# Patient Record
Sex: FEMALE | NOT HISPANIC OR LATINO | Employment: FULL TIME | URBAN - METROPOLITAN AREA
[De-identification: names, ages, dates, MRNs, and addresses within clinical notes are randomized per-mention and may not be internally consistent; named-entity substitution may affect disease eponyms.]

---

## 2017-01-13 RX ORDER — FLUTICASONE PROPIONATE 50 MCG
SPRAY, SUSPENSION (ML) NASAL
Qty: 16 G | Refills: 3 | Status: SHIPPED | OUTPATIENT
Start: 2017-01-13

## 2017-05-12 ENCOUNTER — OFFICE VISIT (OUTPATIENT)
Dept: INTERNAL MEDICINE | Facility: MEDICAL CENTER | Age: 62
End: 2017-05-12
Payer: COMMERCIAL

## 2017-05-12 VITALS
HEIGHT: 62 IN | DIASTOLIC BLOOD PRESSURE: 69 MMHG | OXYGEN SATURATION: 95 % | RESPIRATION RATE: 18 BRPM | TEMPERATURE: 97.9 F | SYSTOLIC BLOOD PRESSURE: 100 MMHG | HEART RATE: 74 BPM

## 2017-05-12 DIAGNOSIS — F43.9 STRESS: ICD-10-CM

## 2017-05-12 DIAGNOSIS — K12.0 APHTHOUS ULCER OF MOUTH: ICD-10-CM

## 2017-05-12 PROCEDURE — 99213 OFFICE O/P EST LOW 20 MIN: CPT | Mod: GE | Performed by: INTERNAL MEDICINE

## 2017-05-12 ASSESSMENT — ENCOUNTER SYMPTOMS
WEIGHT LOSS: 0
NAUSEA: 0
BLOOD IN STOOL: 0
HEARTBURN: 0
CHILLS: 0
SORE THROAT: 1
FLANK PAIN: 0
CONSTIPATION: 0
NERVOUS/ANXIOUS: 1
DIARRHEA: 0
FOCAL WEAKNESS: 0
COUGH: 0
POLYDIPSIA: 0
HEADACHES: 0
DIZZINESS: 0
FEVER: 0
DEPRESSION: 0
DOUBLE VISION: 0
NECK PAIN: 0
BLURRED VISION: 0
VOMITING: 0
SHORTNESS OF BREATH: 0
MYALGIAS: 0

## 2017-05-12 ASSESSMENT — PATIENT HEALTH QUESTIONNAIRE - PHQ9
CLINICAL INTERPRETATION OF PHQ2 SCORE: 4
5. POOR APPETITE OR OVEREATING: 0 - NOT AT ALL
SUM OF ALL RESPONSES TO PHQ QUESTIONS 1-9: 6

## 2017-05-12 NOTE — MR AVS SNAPSHOT
"        Shaunna Vieira   2017 8:30 AM   Office Visit   MRN: [id number]    Department:  [facility]   Dept Phone:  [phone]    Description:  Female : 1955   Provider:  Holden Jauregui D.O.           Reason for Visit     Pharyngitis sore throat, x 1 week       Allergies as of 2017     Allergen Noted Reactions    Morphine 2015   Shortness of Breath    Also get hallucinations    Prilosec [Omeprazole] 08/15/2016       Nystatin 2015   Rash    .    Pravastatin 2015       Sulfa Drugs 2014       Pt doesn't know what happens.      You were diagnosed with     Aphthous ulcer of mouth   [682098]       Stress   [989789]         Vital Signs     Blood Pressure Pulse Temperature Respirations Height       100/69 mmHg 74 36.6 °C (97.9 °F) 18 1.575 m (5' 2.01\")     Oxygen Saturation Smoking Status                95% Never Smoker           Basic Information     Date Of Birth Sex Race Ethnicity Preferred Language    1955 Female White Non- English      Problem List              ICD-10-CM Priority Class Noted - Resolved    Colon polyps K63.5   2016 - Present    Impaired fasting glucose R73.01   2016 - Present    Vitamin D deficiency E55.9   2016 - Present    Mixed hyperlipidemia E78.2   2016 - Present    Gastroesophageal reflux disease without esophagitis K21.9   2016 - Present    Tinnitus of both ears H93.13   2016 - Present    Abnormal liver function tests R79.89   2016 - Present    Obesity (BMI 35.0-39.9 without comorbidity) (HCC) E66.9   2016 - Present    Environmental allergies Z91.09   2016 - Present    Healthcare maintenance Z00.00   2016 - Present    Stress F43.9   2016 - Present    Dermatitis L30.9   2016 - Present    Knee pain, left M25.562   2016 - Present      Health Maintenance        Date Due Completion Dates    IMM DTaP/Tdap/Td Vaccine (1 - Tdap) 1974 ---    PAP SMEAR 1976 " ---    IMM ZOSTER VACCINE 11/2/2015 ---    MAMMOGRAM 7/31/2016 7/31/2015, 8/2/2013, 7/26/2013, 7/9/2010, 7/9/2010, 1/9/2009    COLONOSCOPY 1/11/2025 1/11/2015 (Done)    Override on 1/11/2015: Done            Current Immunizations     No immunizations on file.      Below and/or attached are the medications your provider expects you to take. Review all of your home medications and newly ordered medications with your provider and/or pharmacist. Follow medication instructions as directed by your provider and/or pharmacist. Please keep your medication list with you and share with your provider. Update the information when medications are discontinued, doses are changed, or new medications (including over-the-counter products) are added; and carry medication information at all times in the event of emergency situations     Allergies:  MORPHINE - Shortness of Breath     PRILOSEC - (reactions not documented)     NYSTATIN - Rash     PRAVASTATIN - (reactions not documented)     SULFA DRUGS - (reactions not documented)               Medications  Valid as of: May 12, 2017 -  9:25 AM    Generic Name Brand Name Tablet Size Instructions for use    Ascorbic Acid   Take  by mouth every day.        Benzocaine (Lozenge) Benzocaine 10 MG Spray 1 Lozenge in mouth/throat 2 times a day as needed.        Benzonatate (Cap) TESSALON 100 MG Take 1 Cap by mouth 3 times a day as needed for Cough.        Chlorhexidine Gluconate (Liquid) HIBICLENS 4 % Apply  to affected area(s) 1 time daily as needed.        Cholecalciferol (Cap) Vitamin D 2000 UNITS Take  by mouth 2 times a day.        Clindamycin Phos-Benzoyl Perox (Gel) BENZACLIN 1-5 % Apply  to affected area(s) 2 times a day.        Fluticasone Furoate   Spray  in nose every day.        Fluticasone Propionate (Suspension) FLONASE 50 MCG/ACT USE 2 SPRAYS IN EACH NOSTRIL DAILY AS NEEDED        Glucosamine-Chondroit-Vit C-Mn   Take  by mouth every day.        Hydrocortisone (Cream)  hydrocortisone 2.5 % Apply  to affected area(s) as needed.        Hydrocortisone (Ointment) hydrocortisone 1 % Apply  to affected area(s) 2 times a day.        Multiple Vitamin   Take  by mouth.        Mupirocin (Ointment) BACTROBAN 2 % Apply  to affected area(s) as needed.        .                 Medicines prescribed today were sent to:     [id number] - [place], [place] - [address]    [address] [place] [address]    Phone: [phone] Fax: [phone]    Open 24 Hours?: No    [facility] - [place], [place] - [address]    [address] [address]    Phone: [phone] Fax: [phone]    Open 24 Hours?: No      Medication refill instructions:       If your prescription bottle indicates you have medication refills left, it is not necessary to call your provider’s office. Please contact your pharmacy and they will refill your medication.    If your prescription bottle indicates you do not have any refills left, you may request refills at any time through one of the following ways: The online [email or web address] system (except Urgent Care), by calling your provider’s office, or by asking your pharmacy to contact your provider’s office with a refill request. Medication refills are processed only during regular business hours and may not be available until the next business day. Your provider may request additional information or to have a follow-up visit with you prior to refilling your medication.   *Please Note: Medication refills are assigned a new Rx number when refilled electronically. Your pharmacy may indicate that no refills were authorized even though a new prescription for the same medication is available at the pharmacy. Please request the medicine by name with the pharmacy before contacting your provider for a refill.        Your To Do List     Future Labs/Procedures Complete By Expires    HEPATITIS PANEL ACUTE(4 COMPONENTS)  As directed 5/12/2018    HIV  ANTIBODIES  As directed 5/12/2018      Referral     A referral request has been sent to our patient care coordination department. Please allow 3-5 business days for us to process this request and contact you either by phone or mail. If you do not hear from us by the 5th business day, please call us at [phone].        Instructions    Canker Sores  Canker sores are small, painful sores that develop inside your mouth. They may also be called aphthous ulcers. You can get canker sores on the inside of your lips or cheeks, on your tongue, or anywhere inside your mouth. You can have just one canker sore or several of them. Canker sores cannot be passed from one person to another (noncontagious). These sores are different than the sores that you may get on the outside of your lips (cold sores or fever blisters).  Canker sores usually start as painful red bumps. Then they turn into small white, yellow, or gray ulcers that have red borders. The ulcers may be quite painful. The pain may be worse when you eat or drink.  CAUSES  The cause of this condition is not known.  RISK FACTORS  This condition is more likely to develop in:  · Women.  · People in their teens or 20s.  · Women who are having their menstrual period.  · People who are under a lot of emotional stress.  · People who do not get enough iron or B vitamins.  · People who have poor oral hygiene.  · People who have an injury inside the mouth. This can happen after having dental work or from chewing something hard.  SYMPTOMS  Along with the canker sore, symptoms may also include:  · Fever.  · Fatigue.  · Swollen lymph nodes in your neck.  DIAGNOSIS  This condition can be diagnosed based on your symptoms. Your health care provider will also examine your mouth. Your health care provider may also do tests if you get canker sores often or if they are very bad. Tests may include:  · Blood tests to rule out other causes of canker sores.  · Taking swabs from the sore to  check for infection.  · Taking a small piece of skin from the sore (biopsy) to test it for cancer.  TREATMENT  Most canker sores clear up without treatment in about 10 days. Home care is usually the only treatment that you will need. Over-the-counter medicines can relieve discomfort. If you have severe canker sores, your health care provider may prescribe:  · Numbing ointment to relieve pain.  · Vitamins.  · Steroid medicines. These may be given as:  ¨ Oral pills.  ¨ Mouth rinses.  ¨ Gels.  · Antibiotic mouth rinse.  HOME CARE INSTRUCTIONS  · Apply, take, or use medicines only as directed by your health care provider. These include vitamins.  · If you were prescribed an antibiotic mouth rinse, finish all of it even if you start to feel better.  · Until the sores are healed:  ¨ Do not drink coffee or citrus juices.  ¨ Do not eat spicy or salty foods.  · Use a mild, over-the-counter mouth rinse as directed by your health care provider.  · Practice good oral hygiene.  ¨ Floss your teeth every day.  ¨ Brush your teeth with a soft brush twice each day.  SEEK MEDICAL CARE IF:  · Your symptoms do not get better after two weeks.  · You also have a fever or swollen glands.  · You get canker sores often.  · You have a canker sore that is getting larger.  · You cannot eat or drink due to your canker sores.     This information is not intended to replace advice given to you by your health care provider. Make sure you discuss any questions you have with your health care provider.     Document Released: 04/13/2012 Document Revised: 05/03/2016 Document Reviewed: 11/18/2015  [employer] Interactive Patient Education ©2016 [employer] Inc.            Miappi Access Code: [email or web address]  Expires: 6/11/2017  8:15 AM    Miappi  A secure, online tool to manage your health information     [employer] Miappi® is a secure, online tool that connects you to your personalized health information from the privacy of your home -- day or night  - making it very easy for you to manage your healthcare. Once the activation process is completed, you can even access your medical information using the [employer] emeterio, which is available for free in the Apple Emeterio store or Google Play store.     [employer] provides the following levels of access (as shown below):   My Chart Features   [facility] Doctor [facility]  Specialists [facility]  Urgent  Care [facility]  Primary Care  Doctor   Email your healthcare team securely and privately 24/7 X X X    Manage appointments: schedule your next appointment; view details of past/upcoming appointments X      Request prescription refills. X      View recent personal medical records, including lab and immunizations X X X X   View health record, including health history, allergies, medications X X X X   Read reports about your outpatient visits, procedures, consult and ER notes X X X X   See your discharge summary, which is a recap of your hospital and/or ER visit that includes your diagnosis, lab results, and care plan. X X       How to register for [employer]:  1. Go to  [email or web address].  2. Click on the Sign Up Now box, which takes you to the New Member Sign Up page. You will need to provide the following information:  a. Enter your [employer] Access Code exactly as it appears at the top of this page. (You will not need to use this code after you’ve completed the sign-up process. If you do not sign up before the expiration date, you must request a new code.)   b. Enter your date of birth.   c. Enter your home email address.   d. Click Submit, and follow the next screen’s instructions.  3. Create a [employer] ID. This will be your [employer] login ID and cannot be changed, so think of one that is secure and easy to remember.  4. Create a [employer] password. You can change your password at any time.  5. Enter your Password Reset Question and Answer. This can be used at a later time if you forget your password.   6. Enter your e-mail  address. This allows you to receive e-mail notifications when new information is available in H.BLOOM.  7. Click Sign Up. You can now view your health information.    For assistance activating your [employer].BL[employer]OM account, call [phone]

## 2017-05-12 NOTE — PATIENT INSTRUCTIONS
Canker Sores  Canker sores are small, painful sores that develop inside your mouth. They may also be called aphthous ulcers. You can get canker sores on the inside of your lips or cheeks, on your tongue, or anywhere inside your mouth. You can have just one canker sore or several of them. Canker sores cannot be passed from one person to another (noncontagious). These sores are different than the sores that you may get on the outside of your lips (cold sores or fever blisters).  Canker sores usually start as painful red bumps. Then they turn into small white, yellow, or gray ulcers that have red borders. The ulcers may be quite painful. The pain may be worse when you eat or drink.  CAUSES  The cause of this condition is not known.  RISK FACTORS  This condition is more likely to develop in:  · Women.  · People in their teens or 20s.  · Women who are having their menstrual period.  · People who are under a lot of emotional stress.  · People who do not get enough iron or B vitamins.  · People who have poor oral hygiene.  · People who have an injury inside the mouth. This can happen after having dental work or from chewing something hard.  SYMPTOMS  Along with the canker sore, symptoms may also include:  · Fever.  · Fatigue.  · Swollen lymph nodes in your neck.  DIAGNOSIS  This condition can be diagnosed based on your symptoms. Your health care provider will also examine your mouth. Your health care provider may also do tests if you get canker sores often or if they are very bad. Tests may include:  · Blood tests to rule out other causes of canker sores.  · Taking swabs from the sore to check for infection.  · Taking a small piece of skin from the sore (biopsy) to test it for cancer.  TREATMENT  Most canker sores clear up without treatment in about 10 days. Home care is usually the only treatment that you will need. Over-the-counter medicines can relieve discomfort. If you have severe canker sores, your health care  provider may prescribe:  · Numbing ointment to relieve pain.  · Vitamins.  · Steroid medicines. These may be given as:  ¨ Oral pills.  ¨ Mouth rinses.  ¨ Gels.  · Antibiotic mouth rinse.  HOME CARE INSTRUCTIONS  · Apply, take, or use medicines only as directed by your health care provider. These include vitamins.  · If you were prescribed an antibiotic mouth rinse, finish all of it even if you start to feel better.  · Until the sores are healed:  ¨ Do not drink coffee or citrus juices.  ¨ Do not eat spicy or salty foods.  · Use a mild, over-the-counter mouth rinse as directed by your health care provider.  · Practice good oral hygiene.  ¨ Floss your teeth every day.  ¨ Brush your teeth with a soft brush twice each day.  SEEK MEDICAL CARE IF:  · Your symptoms do not get better after two weeks.  · You also have a fever or swollen glands.  · You get canker sores often.  · You have a canker sore that is getting larger.  · You cannot eat or drink due to your canker sores.     This information is not intended to replace advice given to you by your health care provider. Make sure you discuss any questions you have with your health care provider.     Document Released: 04/13/2012 Document Revised: 05/03/2016 Document Reviewed: 11/18/2015  [employer] Interactive Patient Education ©2016 [employer]

## 2017-05-12 NOTE — PROGRESS NOTES
"      Established Patient    Shaunna presents today with the following:    CC: \"Tonsillitis\"    HPI:   This patient is a very pleasant 61 year old female reporting to clinic for acute complaint of painful right tonsil of one week's duration. She notes no cough, fever, chills, nausea, vomiting, or shortness of breath. She states the pain sometimes improves with mouthwash or benzocaine lozenges. She does endorse occasional oral sores which feel similarly but are not usually in this location.     Although she reports no high risk sexual behavior, she is of an age that indicates screening for HIV.     Patient Active Problem List    Diagnosis Date Noted   • Colon polyps 08/25/2016   • Impaired fasting glucose 08/25/2016   • Vitamin D deficiency 08/25/2016   • Mixed hyperlipidemia 08/25/2016   • Gastroesophageal reflux disease without esophagitis 08/25/2016   • Tinnitus of both ears 08/25/2016   • Abnormal liver function tests 08/25/2016   • Obesity (BMI 35.0-39.9 without comorbidity) ([facility]) 08/25/2016   • Environmental allergies 08/25/2016   • Healthcare maintenance 08/25/2016   • Stress 08/25/2016   • Dermatitis 08/25/2016   • Knee pain, left 08/25/2016       Current Outpatient Prescriptions   Medication Sig Dispense Refill   • fluticasone (FLONASE) 50 MCG/ACT nasal spray USE 2 SPRAYS IN EACH NOSTRIL DAILY AS NEEDED 16 g 3   • benzonatate (TESSALON) 100 MG Cap Take 1 Cap by mouth 3 times a day as needed for Cough. 30 Cap 0   • clindamycin-benzoyl peroxide (BENZACLIN) gel Apply  to affected area(s) 2 times a day.     • mupirocin (BACTROBAN) 2 % OINT Apply  to affected area(s) as needed.     • Cholecalciferol (VITAMIN D) 2000 UNITS CAPS Take  by mouth 2 times a day.     • Glucosamine-Chondroit-Vit C-Mn (GLUCOSAMINE CHONDR 1500 COMPLX PO) Take  by mouth every day.     • FLUTICASONE FUROATE NA [place]  in nose every day.     • Ascorbic Acid (VITAMIN C PO) Take  by mouth every day.     • chlorhexidine (HIBICLENS) 4 % liquid Apply " " to affected area(s) 1 time daily as needed.     • Multiple Vitamin (MULTI-VITAMIN DAILY PO) Take  by mouth.     • hydrocortisone 2.5 % CREA Apply  to affected area(s) as needed.     • hydrocortisone 1 % OINT Apply  to affected area(s) 2 times a day.       No current facility-administered medications for this visit.       ROS: As per HPI. Additional pertinent symptoms as noted below.  Review of Systems   Constitutional: Negative for fever, chills and weight loss.   HENT: Positive for sore throat.    Eyes: Negative for blurred vision and double vision.   Respiratory: Negative for cough and shortness of breath.    Cardiovascular: Negative for chest pain and leg swelling.   Gastrointestinal: Negative for heartburn, nausea, vomiting, diarrhea, constipation and blood in stool.   Genitourinary: Negative for dysuria and flank pain.   Musculoskeletal: Negative for myalgias and neck pain.   Neurological: Negative for dizziness, focal weakness and headaches.   Endo/Heme/Allergies: Negative for environmental allergies and polydipsia.   Psychiatric/Behavioral: Negative for depression. The patient is nervous/anxious.    All other systems reviewed and are negative.      /69 mmHg  Pulse 74  Temp(Src) 36.6 °C (97.9 °F)  Resp 18  Ht 1.575 m (5' 2.01\")  Wt   SpO2 95%    Physical Exam   Constitutional: She is oriented to person, place, and time and well-developed, well-nourished, and in no distress. No distress.   HENT:   Head: Normocephalic and atraumatic.   Right Ear: External ear normal.   Left Ear: External ear normal.   Mouth/Throat:       Eyes: Conjunctivae and EOM are normal. Pupils are equal, round, and reactive to light.   Neck: Normal range of motion. No thyromegaly present.   Cardiovascular: Normal rate, regular rhythm and normal heart sounds.    Pulmonary/Chest: Effort normal. No respiratory distress.   Abdominal: Soft. Bowel sounds are normal. She exhibits no distension.   Musculoskeletal: Normal range of " motion. She exhibits no edema.   Neurological: She is alert and oriented to person, place, and time. GCS score is 15.   Skin: Skin is warm and dry.   Psychiatric: Mood, memory, affect and judgment normal.   Vitals reviewed.        Assessment and Plan    1. Aphthous ulcer of mouth  Will screen for HIV. Recommend symptomatic care with oral rinse and benzocaine lozenges.   -rapid strep testing negative  -prescription for benzocaine lozenges    2. Stress  Patient reports she has discussed possible referral to psychology for her elevated anxiety and stress, but today she is definitively requesting referral.  -referral placed to psychology      Followup: Return if symptoms worsen or fail to improve.      Signed by: Holden Jauregui D.O.

## 2018-01-17 ENCOUNTER — HOSPITAL ENCOUNTER (OUTPATIENT)
Dept: RADIOLOGY | Facility: MEDICAL CENTER | Age: 63
End: 2018-01-17
Attending: INTERNAL MEDICINE
Payer: COMMERCIAL

## 2018-01-17 DIAGNOSIS — Z12.31 SCREENING MAMMOGRAM, ENCOUNTER FOR: ICD-10-CM

## 2018-01-17 PROCEDURE — 77067 SCR MAMMO BI INCL CAD: CPT

## 2018-03-07 ENCOUNTER — HOSPITAL ENCOUNTER (OUTPATIENT)
Facility: MEDICAL CENTER | Age: 63
End: 2018-03-07
Attending: NURSE PRACTITIONER
Payer: COMMERCIAL

## 2018-03-07 ENCOUNTER — OFFICE VISIT (OUTPATIENT)
Dept: MEDICAL GROUP | Facility: MEDICAL CENTER | Age: 63
End: 2018-03-07

## 2018-03-07 VITALS
HEIGHT: 63 IN | BODY MASS INDEX: 35.08 KG/M2 | WEIGHT: 198 LBS | SYSTOLIC BLOOD PRESSURE: 108 MMHG | RESPIRATION RATE: 16 BRPM | TEMPERATURE: 98.5 F | DIASTOLIC BLOOD PRESSURE: 68 MMHG | OXYGEN SATURATION: 95 % | HEART RATE: 91 BPM

## 2018-03-07 DIAGNOSIS — N93.9 ABNORMAL UTERINE BLEEDING: ICD-10-CM

## 2018-03-07 DIAGNOSIS — N88.9 CERVIX ABNORMALITY: ICD-10-CM

## 2018-03-07 DIAGNOSIS — Z12.4 PAP SMEAR FOR CERVICAL CANCER SCREENING: ICD-10-CM

## 2018-03-07 DIAGNOSIS — E66.9 OBESITY (BMI 30-39.9): ICD-10-CM

## 2018-03-07 PROBLEM — Z00.00 HEALTH CARE MAINTENANCE: Status: ACTIVE | Noted: 2018-03-07

## 2018-03-07 PROCEDURE — 87510 GARDNER VAG DNA DIR PROBE: CPT

## 2018-03-07 PROCEDURE — 88175 CYTOPATH C/V AUTO FLUID REDO: CPT

## 2018-03-07 PROCEDURE — 87624 HPV HI-RISK TYP POOLED RSLT: CPT

## 2018-03-07 PROCEDURE — 87480 CANDIDA DNA DIR PROBE: CPT

## 2018-03-07 PROCEDURE — 99204 OFFICE O/P NEW MOD 45 MIN: CPT | Performed by: NURSE PRACTITIONER

## 2018-03-07 PROCEDURE — 87660 TRICHOMONAS VAGIN DIR PROBE: CPT

## 2018-03-07 NOTE — ASSESSMENT & PLAN NOTE
Occurred around the age of 55 after losing 30 pounds. Patient has recently had some cyclic pink/brown tinged discharge, occasional red bleeding. Has had some mild associated cramping similar to menstrual cramps. Patient has a very significant family history of multiple different cancers, therefore she would like to have a pelvic and pap to make sure there is no signs of cancer. Denies abdominal pain, bloating, fevers. Has had one abnormal pap in the past, unsure of what the diagnosis was.

## 2018-03-07 NOTE — PROGRESS NOTES
Shaunna Vieira is a 62 y.o. female here to establish care and discuss vaginal bleeding:    HPI:    Abnormal uterine bleeding  Occurred around the age of 55 after losing 30 pounds. Patient has recently had some cyclic pink/brown tinged discharge, occasional red bleeding. Has had some mild associated cramping similar to menstrual cramps. Patient has a very significant family history of multiple different cancers, therefore she would like to have a pelvic and pap to make sure there is no signs of cancer. Denies abdominal pain, bloating, fevers. Has had one abnormal pap in the past, unsure of what the diagnosis was.     Current medicines (including changes today)  Current Outpatient Prescriptions   Medication Sig Dispense Refill   • fluticasone (FLONASE) 50 MCG/ACT nasal spray USE 2 SPRAYS IN EACH NOSTRIL DAILY AS NEEDED 16 g 3   • Glucosamine-Chondroit-Vit C-Mn (GLUCOSAMINE CHONDR 1500 COMPLX PO) Take  by mouth every day.     • clindamycin-benzoyl peroxide (BENZACLIN) gel Apply  to affected area(s) 2 times a day.     • mupirocin (BACTROBAN) 2 % OINT Apply  to affected area(s) as needed.     • Cholecalciferol (VITAMIN D) 2000 UNITS CAPS Take  by mouth 2 times a day.     • FLUTICASONE FUROATE NA [place]  in nose every day.     • Ascorbic Acid (VITAMIN C PO) Take  by mouth every day.     • chlorhexidine (HIBICLENS) 4 % liquid Apply  to affected area(s) 1 time daily as needed.     • Multiple Vitamin (MULTI-VITAMIN DAILY PO) Take  by mouth.     • hydrocortisone 2.5 % CREA Apply  to affected area(s) as needed.     • hydrocortisone 1 % OINT Apply  to affected area(s) 2 times a day.       No current facility-administered medications for this visit.      She  has a past medical history of Abscess; Acne; Allergy; Anesthesia; Breast mass, right; Colon polyps; Elevated random blood glucose level; Elevated transaminase level; GERD (gastroesophageal reflux disease); Hyperlipidemia; Left knee pain; Migraine; Obesity;  "Osteoarthritis; Right arm pain; Right shoulder pain; Tinnitus; and Vitamin D deficiency.  She  has a past surgical history that includes other (2015); knee arthroscopy (Left, 2015); meniscectomy (Left, 2015); synovectomy (Left, 2015); and breast biopsy.  Social History   Substance Use Topics   • Smoking status: Never Smoker   • Smokeless tobacco: Never Used   • Alcohol use Yes      Comment: 1 per month     Social History     Social History Narrative   • No narrative on file     Family History   Problem Relation Age of Onset   • Other Father      PARKINSON'S   • Cancer Father      lymphoma   • Breast Cancer Mother    • Other Mother      BLINDNESS/MAC DEG/SOKER   • Arthritis Mother    • Cancer Mother    • Stroke Mother      TIA   • Arthritis Brother    • Other       ASBERGERS   • Cancer Maternal Grandmother      asbestos     Family Status   Relation Status   • Father    • Mother    • Brother Alive    Asperger's   •     • Maternal Grandmother          ROS  No chest pain, no abdominal pain, no rash.  Positive ROS as per HPI.  All other systems reviewed and are negative      Objective:     Blood pressure 108/68, pulse 91, temperature 36.9 °C (98.5 °F), resp. rate 16, height 1.588 m (5' 2.5\"), weight 89.8 kg (198 lb), SpO2 95 %. Body mass index is 35.64 kg/m².     Physical Exam:    Constitutional: Alert, no distress.  Skin: Warm, dry, good turgor, no rashes in visible areas.  Eye: Equal, round and reactive, conjunctiva clear, lids normal.  ENMT: Lips without lesions, good dentition, oropharynx clear.  Neck: Trachea midline, no masses, no thyromegaly. No cervical or supraclavicular lymphadenopathy.  Respiratory: Unlabored respiratory effort, lungs clear to auscultation, no wheezes, no ronchi.  Cardiovascular: Normal S1, S2, no murmur, no edema.  Abdomen: Soft, non-tender, no masses, no hepatosplenomegaly.  Psych: Alert and oriented x3, normal affect and mood.  Pelvic Exam -  Normal " external genitalia with no lesions. Vaginal Mucosa:  normal vaginal mucosa, normal discharge . no adnexal masses or tenderness, no cervical motion tenderness, no uterine tenderness, medium speculum used with warm water as lubrication, Cervix well visualized with no discharge/friability/bleeding, muscle like tissue protruding through the OS, cervical broom used to obtain cervical specimen, post-specimen collection demonstrated slight bleeding, abnormal Cervix exam. Thin Prep Pap is obtained, vaginal swab is obtained and specimen(s) sent to lab  Rectal: deferred        Assessment and Plan:   The following treatment plan was discussed    1. Abnormal uterine bleeding  Unstable.  Pap and pelvic exam demonstrated a flat, muscle like tissue protruding through the cervical os approximately 5 cm in length 3 cm in width. Tissue began bleeding after swab was cervical broom. Due to patient's significant family history of cancer, urgent referral for gynecology as placed. It is possible that this is uterine tissue as well, patient's mother has a history of uterine prolapse.  - THINPREP PAP WITH HPV; Future  - VAGINAL PATHOGENS DNA PANEL; Future  - REFERRAL TO GYNECOLOGY    2. Pap smear for cervical cancer screening  Pap complete, call with results.  - THINPREP PAP WITH HPV; Future  - VAGINAL PATHOGENS DNA PANEL; Future    3. Cervix abnormality  As above.  Referred to gynecology.  - REFERRAL TO GYNECOLOGY    4. Obesity (BMI 30-39.9)  - Patient identified as having weight management issue.  Appropriate orders and counseling given.      Records requested.  Followup: Return in about 4 weeks (around 4/4/2018) for Annual.    I have placed the below orders and discussed them with an approved delegating provider. The MA is performing the below orders under the direction of Dr. Cast

## 2018-03-08 LAB
CANDIDA DNA VAG QL PROBE+SIG AMP: NEGATIVE
G VAGINALIS DNA VAG QL PROBE+SIG AMP: NEGATIVE
T VAGINALIS DNA VAG QL PROBE+SIG AMP: NEGATIVE

## 2018-03-10 LAB
CYTOLOGY REG CYTOL: NORMAL
HPV HR 12 DNA CVX QL NAA+PROBE: NEGATIVE
HPV16 DNA SPEC QL NAA+PROBE: NEGATIVE
HPV18 DNA SPEC QL NAA+PROBE: NEGATIVE
SPECIMEN SOURCE: NORMAL

## 2018-03-12 ENCOUNTER — TELEPHONE (OUTPATIENT)
Dept: MEDICAL GROUP | Facility: MEDICAL CENTER | Age: 63
End: 2018-03-12

## 2018-03-12 DIAGNOSIS — N88.9 ABNORMALITY OF CERVIX: ICD-10-CM

## 2018-03-12 DIAGNOSIS — N93.9 ABNORMAL UTERINE BLEEDING: ICD-10-CM

## 2018-03-12 NOTE — TELEPHONE ENCOUNTER
----- Message from DUY Herrera sent at 3/12/2018 10:10 AM PDT -----  Please notify patient that the Pap smear was normal. STD testing was negative. Next pap smear is due in 5 years.    DUY Herrera

## 2018-03-12 NOTE — TELEPHONE ENCOUNTER
Spoke to Pt, results given. Pt states she received a call back from GYN and only available MD's for NP apt are males. Pt asking to have a new referral placed with a female provider.

## 2018-04-19 DIAGNOSIS — Z01.812 PRE-OPERATIVE LABORATORY EXAMINATION: ICD-10-CM

## 2018-04-19 LAB
APPEARANCE UR: CLEAR
BASOPHILS # BLD AUTO: 0.9 % (ref 0–1.8)
BASOPHILS # BLD: 0.05 K/UL (ref 0–0.12)
BILIRUB UR QL STRIP.AUTO: NEGATIVE
COLOR UR: YELLOW
EOSINOPHIL # BLD AUTO: 0.13 K/UL (ref 0–0.51)
EOSINOPHIL NFR BLD: 2.3 % (ref 0–6.9)
ERYTHROCYTE [DISTWIDTH] IN BLOOD BY AUTOMATED COUNT: 39.8 FL (ref 35.9–50)
GLUCOSE UR STRIP.AUTO-MCNC: NEGATIVE MG/DL
HCG SERPL QL: NEGATIVE
HCT VFR BLD AUTO: 40 % (ref 37–47)
HGB BLD-MCNC: 13.5 G/DL (ref 12–16)
IMM GRANULOCYTES # BLD AUTO: 0.01 K/UL (ref 0–0.11)
IMM GRANULOCYTES NFR BLD AUTO: 0.2 % (ref 0–0.9)
KETONES UR STRIP.AUTO-MCNC: NEGATIVE MG/DL
LEUKOCYTE ESTERASE UR QL STRIP.AUTO: NEGATIVE
LYMPHOCYTES # BLD AUTO: 1.71 K/UL (ref 1–4.8)
LYMPHOCYTES NFR BLD: 29.9 % (ref 22–41)
MCH RBC QN AUTO: 28.9 PG (ref 27–33)
MCHC RBC AUTO-ENTMCNC: 33.8 G/DL (ref 33.6–35)
MCV RBC AUTO: 85.7 FL (ref 81.4–97.8)
MICRO URNS: NORMAL
MONOCYTES # BLD AUTO: 0.52 K/UL (ref 0–0.85)
MONOCYTES NFR BLD AUTO: 9.1 % (ref 0–13.4)
NEUTROPHILS # BLD AUTO: 3.29 K/UL (ref 2–7.15)
NEUTROPHILS NFR BLD: 57.6 % (ref 44–72)
NITRITE UR QL STRIP.AUTO: NEGATIVE
NRBC # BLD AUTO: 0 K/UL
NRBC BLD-RTO: 0 /100 WBC
PH UR STRIP.AUTO: 6.5 [PH]
PLATELET # BLD AUTO: 248 K/UL (ref 164–446)
PMV BLD AUTO: 9.5 FL (ref 9–12.9)
PROT UR QL STRIP: NEGATIVE MG/DL
RBC # BLD AUTO: 4.67 M/UL (ref 4.2–5.4)
RBC UR QL AUTO: NEGATIVE
SP GR UR STRIP.AUTO: 1.02
UROBILINOGEN UR STRIP.AUTO-MCNC: 0.2 MG/DL
WBC # BLD AUTO: 5.7 K/UL (ref 4.8–10.8)

## 2018-04-19 PROCEDURE — 36415 COLL VENOUS BLD VENIPUNCTURE: CPT

## 2018-04-19 PROCEDURE — 85025 COMPLETE CBC W/AUTO DIFF WBC: CPT

## 2018-04-19 PROCEDURE — 81003 URINALYSIS AUTO W/O SCOPE: CPT

## 2018-04-19 PROCEDURE — 84703 CHORIONIC GONADOTROPIN ASSAY: CPT

## 2018-04-19 RX ORDER — TRIAMCINOLONE ACETONIDE 1 MG/G
CREAM TOPICAL PRN
COMMUNITY

## 2018-04-19 RX ORDER — KETOCONAZOLE 20 MG/G
CREAM TOPICAL PRN
COMMUNITY
End: 2022-04-12 | Stop reason: SDUPTHER

## 2018-04-19 RX ORDER — EMOL53/SOD MAG FL.SIL/CYCLOMET
KIT, CREAM AND GEL TOPICAL PRN
COMMUNITY
End: 2022-06-28

## 2018-04-20 NOTE — H&P
HISTORY OF PRESENT ILLNESS:  Here for preop appointment scheduled for   hysteroscopy, polypectomy, and dilatation and curettage on 2018.  The   patient was referred by Dr. Latisha Dunne for cervical abnormality noted   during Pap examination.  Since then, patient had couple of episodes of heavy   vaginal bleeding and also has profuse watery vaginal discharge for which she   had to wear a pad in between.  The patient is a  0, para 0, is not   currently sexually active and is postmenopausal since age 53, not on any   hormone replacement therapy.    PAST MEDICAL HISTORY:  Patient denies any diabetes, high blood pressure or   thyroid issues.  History of arthritis for which she takes Tylenol and Motrin   as needed and has issues with hearing problem.    PAST SURGICAL HISTORY:  No prior surgeries.    FAMILY HISTORY:  History of prostate cancer and mom had breast cancer at age   70.    The patient had colonic polyp, which was removed on colonoscopy and was found   to be benign.  Patient has been prediabetic, but working on her diet and   exercise currently.    GENERAL PHYSICAL EXAMINATION:  VITAL SIGNS:  Patient is 5 feet 2 inches tall and weighs 196 pounds.  No   pallor.  GENERAL:  Awake, alert, well-developed, well-nourished, and well-groomed.  SYSTEMIC EXAMINATION:  Both heart sounds are heard.  No added sounds, regular   rate and rhythm.  RESPIRATORY:  Clear to auscultate bilaterally.  No added sounds.  ABDOMEN:  Soft, nontender.  No guarding or rigidity.  Bowel sounds are normal.  PELVIC:  External genitalia without any lesions.  Cervix is smooth, pink, with   clear watery discharge.  A large tongue like projection coming out through   the os, which is smooth, floppy, and is soft to touch.  Mild spotting on   touch.  _____ with protrusions around extending 5 cm from the os that almost   comes up to the introitus.  Vaginal mucosa is pink, clear, watery discharge.    There is no cystocele or rectocele.  The  uterine body is of normal size,   anteverted, mobile, and nontender.    IMPRESSION:  A 62-year-old postmenopausal with postmenopausal bleeding and   watery vaginal discharge, cervical polyp 5 cm, which has last pedicle.    PLAN:  Hysteroscopy, dilatation and curettage with polypectomy.  Preoperative   instructions was given.  Operative procedure was discussed in detail.    Postoperative recovery was explained.  The risk inclusive of, but not limited   to anesthesia, infection, injury and bleeding, the risk of perforation was   discussed.  Patient understands, asked appropriate questions, all answered to   satisfaction.       ____________________________________     MD LUCRETIA Gu / ASHISH    DD:  04/20/2018 15:40:19  DT:  04/20/2018 16:15:21    D#:  [id number]  Job#:  [id number]

## 2018-04-21 NOTE — H&P
HISTORY OF PRESENT ILLNESS:  Here for preop appointment scheduled for   hysteroscopy, polypectomy, and dilatation and curettage on 2018.  The   patient was referred by Dr. Latisha Dunne for cervical abnormality noted   during Pap examination.  Since then, patient had couple of episodes of heavy   vaginal bleeding and also has profuse watery vaginal discharge for which she   had to wear a pad in between.  The patient is a  0, para 0, is not   currently sexually active and is postmenopausal since age 53, not on any   hormone replacement therapy.    PAST MEDICAL HISTORY:  Patient denies any diabetes, high blood pressure or   thyroid issues.  History of arthritis for which she takes Tylenol and Motrin   as needed and has issues with hearing problem.    PAST SURGICAL HISTORY:  No prior surgeries.    FAMILY HISTORY:  History of prostate cancer and mom had breast cancer at age   70.    The patient had colonic polyp, which was removed on colonoscopy and was found   to be benign.  Patient has been prediabetic, but working on her diet and   exercise currently.    GENERAL PHYSICAL EXAMINATION:  VITAL SIGNS:  Patient is 5 feet 2 inches tall and weighs 196 pounds.  No   pallor.  GENERAL:  Awake, alert, well-developed, well-nourished, and well-groomed.  SYSTEMIC EXAMINATION:  Both heart sounds are heard.  No added sounds, regular   rate and rhythm.  RESPIRATORY:  Clear to auscultate bilaterally.  No added sounds.  ABDOMEN:  Soft, nontender.  No guarding or rigidity.  Bowel sounds are normal.  PELVIC:  External genitalia without any lesions.  Cervix is smooth, pink, with   clear watery discharge.  A large tongue like projection coming out through   the os, which is smooth, floppy, and is soft to touch.  Mild spotting on   touch.  _____ with protrusions around extending 5 cm from the os that almost   comes up to the introitus.  Vaginal mucosa is pink, clear, watery discharge.    There is no cystocele or rectocele.  The  uterine body is of normal size,   anteverted, mobile, and nontender.    IMPRESSION:  A 62-year-old postmenopausal with postmenopausal bleeding and   watery vaginal discharge, cervical polyp 5 cm, which has last pedicle.    PLAN:  Hysteroscopy, dilatation and curettage with polypectomy.  Preoperative   instructions was given.  Operative procedure was discussed in detail.    Postoperative recovery was explained.  The risk inclusive of, but not limited   to anesthesia, infection, injury and bleeding, the risk of perforation was   discussed.  Patient understands, asked appropriate questions, all answered to   satisfaction.       ____________________________________     MD LUCRETIA Gu / ASHISH    DD:  04/20/2018 15:40:19  DT:  04/20/2018 16:15:21    D#:  [id number]  Job#:  [id number]

## 2018-04-24 ENCOUNTER — OFFICE VISIT (OUTPATIENT)
Dept: MEDICAL GROUP | Facility: MEDICAL CENTER | Age: 63
End: 2018-04-24
Payer: COMMERCIAL

## 2018-04-24 VITALS
OXYGEN SATURATION: 93 % | TEMPERATURE: 97.4 F | HEIGHT: 63 IN | DIASTOLIC BLOOD PRESSURE: 68 MMHG | WEIGHT: 198 LBS | HEART RATE: 78 BPM | RESPIRATION RATE: 16 BRPM | SYSTOLIC BLOOD PRESSURE: 114 MMHG | BODY MASS INDEX: 35.08 KG/M2

## 2018-04-24 DIAGNOSIS — N88.9 CERVIX ABNORMALITY: ICD-10-CM

## 2018-04-24 DIAGNOSIS — R25.2 LEG CRAMPS: ICD-10-CM

## 2018-04-24 PROCEDURE — 99213 OFFICE O/P EST LOW 20 MIN: CPT | Performed by: FAMILY MEDICINE

## 2018-04-24 ASSESSMENT — PATIENT HEALTH QUESTIONNAIRE - PHQ9: CLINICAL INTERPRETATION OF PHQ2 SCORE: 0

## 2018-04-24 NOTE — ASSESSMENT & PLAN NOTE
Patient has a cervical polyp that she is scheduled for surgery with next week.  She has had a lot of bleeding.

## 2018-04-24 NOTE — ASSESSMENT & PLAN NOTE
She is having leg cramps which she has had in the past.  Bilateral thigh and calf pain.  She has restarted apple cider vinegar.  She is concerned because she can't take any ibuprofen or herbal supplements prior to the surgery. She is on her feet all day and reports that rubbing her legs actually improves the pain.

## 2018-04-24 NOTE — PATIENT INSTRUCTIONS
"Magnesium Rich Diet  Magnesium is a needed mineral found in the body. It is important for strong bones and a healthy heart. Magnesium also plays a role in muscle and nerve function. Certain medical conditions, such as poorly controlled diabetes and gastrointestinal disease, may lead to a lack of magnesium in the body (magnesium deficiency).  SOURCES OF MAGNESIUM  · Green vegetables such as spinach are good sources of magnesium.  · Some beans and peas (legumes), nuts and seeds, and whole, unrefined grains are also good sources of magnesium. Refined grains are generally low in magnesium. When white flour is refined and processed, the magnesium-rich germ and bran are removed.  · Bread made from whole-grain wheat flour provides more magnesium than bread made from white refined flour.  · Tap water can be a source of magnesium, but the amount varies according to the water supply. Water that naturally contains more minerals is described as \"hard.\" Hard water contains more magnesium than \"soft\" water.  Eating a wide variety of legumes, nuts, whole grains, and green vegetables will help you meet your daily dietary need for magnesium. Selected food sources of magnesium are listed below.  FOOD SOURCES  Food / Magnesium (mg)  · Almonds, dry roasted, 1 oz / 80 mg  · Cashews, dry roasted, 1 oz / 75 mg  · Soybeans, mature, cooked, ½ cup / 75 mg  · Spinach, frozen, cooked, ½ cup / 75 mg  · Nuts, mixed, dry roasted, 1 oz / 65 mg  · Cereal, shredded wheat, 1 bowl / 55 mg  · Oatmeal, instant, prepared with water, 1 cup / 55 mg  · Potato, baked with skin, 1 medium / 50 mg  · Peanuts, dry roasted, 1 oz / 50 mg  · Peanut butter, smooth, 2 tbs / 50 mg  · Wheat bran, crude, 2 tbs / 45 mg  · Black-eyed peas, cooked, ½ cup / 45 mg  · Yogurt, plain, skim milk, 8 oz / 45 mg  · Bran flakes, ¾ cup / 40 mg  · Vegetarian baked beans, ½ cup / 40 mg  · Brown rice, long-grained, cooked, ½ cup / 40 mg  · Lentils, mature seeds, cooked, ½ cup / 35 " mg  · Avocado, ½ cup pureed / 35 mg  · Kidney beans, canned, ½ cup / 35 mg  · [place] beans, cooked, ½ cup / 35 mg  · Wheat germ, crude, 2 tbs / 35 mg  · Chocolate milk, 1 cup / 33 mg  · Banana, 1 medium / 30 mg  · Milk chocolate candy bar, 1 ½ oz / 28 mg  · Milk, reduced fat (2%) or fat-free, 1 cup / 28 mg  · Bread, whole-wheat, 1 slice / 25 mg  · Raisins, seedless, ¼ cup / 25 mg  · Whole milk, 1 cup / 24 mg  · Chocolate pudding, 4 oz / 24 mg  RECOMMENDED DIETARY ALLOWANCES FOR MAGNESIUM  The following list shows the Recommended Dietary Allowances for magnesium in milligrams per day for children and adults.  Age: 1 to 3 years  · Male: 80 mg  · Female: 80 mg  Age: 4 to 8 years  · Male: 130 mg  · Female: 130 mg  Age: 9 to 13 years  · Male: 240 mg  · Female: 240 mg  Age: 14 to 18 years  · Male: 410 mg  · Female: 360 mg  · Pregnant: 400 mg  · Breastfeedin mg  Age: 19 to 30 years  · Male: 400 mg  · Female: 310 mg  · Pregnant: 350 mg  · Breastfeedin mg  Age: 31 years or older  · Male: 420 mg  · Female: 320 mg  · Pregnant: 360 mg  · Breastfeeding: 320 mg  RECOMMENDED ADEQUATE INTAKE FOR MAGNESIUM FOR INFANTS  There is not enough information on magnesium to establish Recommended Dietary Allowances for infants. The following list shows the average intake of magnesium in healthy,  infants in milligrams per day.  Age:  0 to 6 months  · Male: 30 mg  · Female: 30 mg  Age: 7 to 12 months  · Male: 75 mg  · Female: 75 mg  HEALTH RISKS OF TOO MUCH MAGNESIUM  Dietary magnesium does not pose a health risk. However, large doses of magnesium in supplements can cause adverse effects such as diarrhea and abdominal cramping. Risk of magnesium toxicity increases with kidney failure, when the kidney loses the ability to remove excess magnesium. Very large doses of magnesium-containing laxatives and antacids have also been associated with magnesium toxicity. Signs of excess magnesium can be similar to magnesium deficiency  and include changes in mental status, nausea, diarrhea, appetite loss, muscle weakness, difficulty breathing, extremely low blood pressure, and irregular heartbeat.   TOLERABLE UPPER INTAKE LEVELS FOR SUPPLEMENTAL MAGNESIUM  The following list shows the tolerable limits (upper intake levels) for magnesium from supplements in healthy infants, children, and adults in milligrams per day. A caregiver may prescribe magnesium in higher doses for specific medical problems. There is no upper intake level for magnesium from food sources.   Age: Infants   · Male: Undetermined  · Female: Undetermined  Age: 4 to 8 years  · Male: 110 mg  · Female: 130 mg  Age: 9 to 18 years  · Male: 240 mg  · Female: 240 mg  Age: 19 years or older  · Male: 350 mg  · Female: 350 mg  · Pregnant: 350 mg  · Breastfeedin mg  Document Released: 2006 Document Revised: 2013 Document Reviewed: 2010  Exit[employer]® Patient Information © [employer].  Potassium Content of Foods  Potassium is a mineral found in many foods and drinks. It helps keep fluids and minerals balanced in your body and affects how steadily your heart beats. Potassium also helps control your blood pressure and keep your muscles and nervous system healthy.  Certain health conditions and medicines may change the balance of potassium in your body. When this happens, you can help balance your level of potassium through the foods that you do or do not eat. Your health care provider or dietitian may recommend an amount of potassium that you should have each day. The following lists of foods provide the amount of potassium (in parentheses) per serving in each item.  High in potassium  The following foods and beverages have 200 mg or more of potassium per serving:  · Apricots, 2 raw or 5 dry (200 mg).  · Artichoke, 1 medium (345 mg).  · Avocado, raw, ¼ each (245 mg).  · Banana, 1 medium (425 mg).  · Beans, lima, or baked beans, canned, ½ cup (280 mg).  · Beans, white,  canned, ½ cup (595 mg).  · Beef roast, 3 oz (320 mg).  · Beef, ground, 3 oz (270 mg).  · Beets, raw or cooked, ½ cup (260 mg).  · Bran muffin, 2 oz (300 mg).  · Broccoli, ½ cup (230 mg).  · [place] sprouts, ½ cup (250 mg).  · Cantaloupe, ½ cup (215 mg).  · Cereal, 100% bran, ½ cup (200-400 mg).  · Cheeseburger, single, fast food, 1 each (225-400 mg).  · Chicken, 3 oz (220 mg).  · Clams, canned, 3 oz (535 mg).  · Crab, 3 oz (225 mg).  · Dates, 5 each (270 mg).  · Dried beans and peas, ½ cup (300-475 mg).  · Figs, dried, 2 each (260 mg).  · Fish: halibut, tuna, cod, snapper, 3 oz (480 mg).  · Fish: salmon, jigar, swordfish, perch, 3 oz (300 mg).  · Fish, tuna, canned 3 oz (200 mg).  · French fries, fast food, 3 oz (470 mg).  · Granola with fruit and nuts, ½ cup (200 mg).  · Grapefruit juice, ½ cup (200 mg).  · Greens, beet, ½ cup (655 mg).  · Honeydew melon, ½ cup (200 mg).  · Kale, raw, 1 cup (300 mg).  · Kiwi, 1 medium (240 mg).  · Kohlrabi, rutabaga, parsnips, ½ cup (280 mg).  · Lentils, ½ cup (365 mg).  · [place]nci, 1 each (325 mg).  · Milk, chocolate, 1 cup (420 mg).  · Milk: nonfat, low-fat, whole, buttermilk, 1 cup (350-380 mg).  · Molasses, 1 Tbsp (295 mg).  · Mushrooms, ½ cup (280) mg.  · Nectarine, 1 each (275 mg).  · Nuts: almonds, peanuts, hazelnuts, [place], cashew, mixed, 1 oz (200 mg).  · Nuts, pistachios, 1 oz (295 mg).  · Orange, 1 each (240 mg).  · Orange juice, ½ cup (235 mg).  · Papaya, medium, ½ fruit (390 mg).  · Peanut butter, chunky, 2 Tbsp (240 mg).  · Peanut butter, smooth, 2 Tbsp (210 mg).  · Pear, 1 medium (200 mg).  · Pomegranate, 1 whole (400 mg).  · Pomegranate juice, ½ cup (215 mg).  · Pork, 3 oz (350 mg).  · Potato chips, salted, 1 oz (465 mg).  · Potato, baked with skin, 1 medium (925 mg).  · Potatoes, boiled, ½ cup (255 mg).  · Potatoes, mashed, ½ cup (330 mg).  · Prune juice, ½ cup (370 mg).  · Prunes, 5 each (305 mg).  · Pudding, chocolate, ½ cup (230 mg).  · Pumpkin, canned, ½ cup  (250 mg).  · Raisins, seedless, ¼ cup (270 mg).  · Seeds, sunflower or pumpkin, 1 oz (240 mg).  · Soy milk, 1 cup (300 mg).  · Spinach, ½ cup (420 mg).  · Spinach, canned, ½ cup (370 mg).  · Sweet potato, baked with skin, 1 medium (450 mg).  · Swiss chard, ½ cup (480 mg).  · Tomato or vegetable juice, ½ cup (275 mg).  · Tomato sauce or puree, ½ cup (400-550 mg).  · Tomato, raw, 1 medium (290 mg).  · Tomatoes, canned, ½ cup (200-300 mg).  · Turkey, 3 oz (250 mg).  · Wheat germ, 1 oz (250 mg).  · Winter squash, ½ cup (250 mg).  · Yogurt, plain or fruited, 6 oz (260-435 mg).  · Zucchini, ½ cup (220 mg).  Moderate in potassium  The following foods and beverages have  mg of potassium per serving:  · Apple, 1 each (150 mg).  · Apple juice, ½ cup (150 mg).  · Applesauce, ½ cup (90 mg).  · Apricot nectar, ½ cup (140 mg).  · Asparagus, small macdonald, ½ cup or 6 macdonald (155 mg).  · Bagel, cinnamon raisin, 1 each (130 mg).  · Bagel, egg or plain, 4 in., 1 each (70 mg).  · Beans, green, ½ cup (90 mg).  · Beans, yellow, ½ cup (190 mg).  · Beer, regular, 12 oz (100 mg).  · Beets, canned, ½ cup (125 mg).  · Blackberries, ½ cup (115 mg).  · Blueberries, ½ cup (60 mg).  · Bread, whole wheat, 1 slice (70 mg).  · Broccoli, raw, ½ cup (145 mg).  · Cabbage, ½ cup (150 mg).  · Carrots, cooked or raw, ½ cup (180 mg).  · Cauliflower, raw, ½ cup (150 mg).  · Celery, raw, ½ cup (155 mg).  · Cereal, bran flakes, ½cup (120-150 mg).  · Cheese, cottage, ½ cup (110 mg).  · Cherries, 10 each (150 mg).  · Chocolate, 1½ oz bar (165 mg).  · Coffee, brewed 6 oz (90 mg).  · Corn, ½ cup or 1 ear (195 mg).  · Cucumbers, ½ cup (80 mg).  · Egg, large, 1 each (60 mg).  · Eggplant, ½ cup (60 mg).  · Endive, raw, ½cup (80 mg).  · English muffin, 1 each (65 mg).  · Fish, orange roughy, 3 oz (150 mg).  · Frankfurter, beef or pork, 1 each (75 mg).  · Fruit cocktail, ½ cup (115 mg).  · Grape juice, ½ cup (170 mg).  · Grapefruit, ½ fruit (175 mg).  · Grapes,  ½ cup (155 mg).  · Greens: kale, turnip, ahmet, ½ cup (110-150 mg).  · Ice cream or frozen yogurt, chocolate, ½ cup (175 mg).  · Ice cream or frozen yogurt, vanilla, ½ cup (120-150 mg).  · Real, limes, 1 each (80 mg).  · Lettuce, all types, 1 cup (100 mg).  · Mixed vegetables, ½ cup (150 mg).  · Mushrooms, raw, ½ cup (110 mg).  · Nuts: walnuts, pecans, or macadamia, 1 oz (125 mg).  · Oatmeal, ½ cup (80 mg).  · Okra, ½ cup (110 mg).  · Onions, raw, ½ cup (120 mg).  · Peach, 1 each (185 mg).  · Peaches, canned, ½ cup (120 mg).  · Pears, canned, ½ cup (120 mg).  · Peas, green, frozen, ½ cup (90 mg).  · Peppers, green, ½ cup (130 mg).  · Peppers, red, ½ cup (160 mg).  · Pineapple juice, ½ cup (165 mg).  · Pineapple, fresh or canned, ½ cup (100 mg).  · Plums, 1 each (105 mg).  · Pudding, vanilla, ½ cup (150 mg).  · Raspberries, ½ cup (90 mg).  · Rhubarb, ½ cup (115 mg).  · Rice, wild, ½ cup (80 mg).  · Shrimp, 3 oz (155 mg).  · Spinach, raw, 1 cup (170 mg).  · Strawberries, ½ cup (125 mg).  · Summer squash ½ cup (175-200 mg).  · Swiss chard, raw, 1 cup (135 mg).  · Tangerines, 1 each (140 mg).  · Tea, brewed, 6 oz (65 mg).  · Turnips, ½ cup (140 mg).  · Watermelon, ½ cup (85 mg).  · Wine, red, table, 5 oz (180 mg).  · Wine, white, table, 5 oz (100 mg).  Low in potassium  The following foods and beverages have less than 50 mg of potassium per serving.  · Bread, white, 1 slice (30 mg).  · Carbonated beverages, 12 oz (less than 5 mg).  · Cheese, 1 oz (20-30 mg).  · Cranberries, ½ cup (45 mg).  · Cranberry juice cocktail, ½ cup (20 mg).  · Fats and oils, 1 Tbsp (less than 5 mg).  · Hummus, 1 Tbsp (32 mg).  · Nectar: papaya, anabel, or pear, ½ cup (35 mg).  · Rice, white or brown, ½ cup (50 mg).  · Spaghetti or macaroni, ½ cup cooked (30 mg).  · Tortilla, flour or corn, 1 each (50 mg).  · Waffle, 4 in., 1 each (50 mg).  · Water chestnuts, ½ cup (40 mg).  This information is not intended to replace advice given to you by  your health care provider. Make sure you discuss any questions you have with your health care provider.  Document Released: 08/01/2006 Document Revised: 05/25/2017 Document Reviewed: 11/14/2014  Elsevier Interactive Patient Education © 2017 [employer]

## 2018-04-26 NOTE — PROGRESS NOTES
Subjective:     Chief Complaint   Patient presents with   • Leg Pain     experiencing severe leg cramps       Shaunna Vieira is a 62 y.o. female here today for evaluation and management of:    Cervix abnormality  Patient has a cervical polyp that she is scheduled for surgery with next week.  She has had a lot of bleeding.      Leg cramps  She is having leg cramps which she has had in the past.  Bilateral thigh and calf pain.  She has restarted apple cider vinegar.  She is concerned because she can't take any ibuprofen or herbal supplements prior to the surgery. She is on her feet all day and reports that rubbing her legs actually improves the pain.       Allergies   Allergen Reactions   • Morphine Shortness of Breath     Also get hallucinations   • Nystatin Rash     .   • Sulfa Drugs Unspecified     Pt doesn't know what happens, had as a child   • Pravastatin        Current medicines (including changes today)  Current Outpatient Prescriptions   Medication Sig Dispense Refill   • fluticasone (FLONASE) 50 MCG/ACT nasal spray USE 2 SPRAYS IN EACH NOSTRIL DAILY AS NEEDED 16 g 3   • Fexofenadine HCl (ALLEGRA ALLERGY PO) Take  by mouth as needed (allergies).     • Dermatological Products, Misc. (HPR PLUS HYDROGEL) Kit by Apply externally route as needed.     • ketoconazole (NIZORAL) 2 % Cream Apply  to affected area(s) as needed.     • triamcinolone acetonide (KENALOG) 0.1 % Cream Apply  to affected area(s) as needed.     • mupirocin (BACTROBAN) 2 % OINT Apply  to affected area(s) as needed.     • Glucosamine-Chondroit-Vit C-Mn (GLUCOSAMINE CHONDR 1500 COMPLX PO) Take  by mouth every day.     • Ascorbic Acid (VITAMIN C PO) Take  by mouth. Twice weekly     • chlorhexidine (HIBICLENS) 4 % liquid Apply  to affected area(s) 1 time daily as needed.     • Multiple Vitamin (MULTI-VITAMIN DAILY PO) Take  by mouth.       No current facility-administered medications for this visit.        She  has a past medical history of  "Abnormal uterine bleeding; Abscess; Acne; Anesthesia; Breast mass, right; Colon polyps; Dermatitis; Elevated random blood glucose level; Elevated transaminase level; Environmental and seasonal allergies; GERD (gastroesophageal reflux disease); Hyperlipidemia; Migraine; Obesity; Osteoarthritis; Pain (04/19/2018); Snoring; Tinnitus; and Vitamin D deficiency.    Patient Active Problem List    Diagnosis Date Noted   • Leg cramps 04/24/2018   • Obesity (BMI 30-39.9) 03/07/2018   • Health care maintenance 03/07/2018   • Abnormal uterine bleeding 03/07/2018   • Cervix abnormality 03/07/2018   • Colon polyps 08/25/2016   • Impaired fasting glucose 08/25/2016   • Vitamin D deficiency 08/25/2016   • Mixed hyperlipidemia 08/25/2016   • Gastroesophageal reflux disease without esophagitis 08/25/2016   • Tinnitus of both ears 08/25/2016   • Abnormal liver function tests 08/25/2016   • Obesity (BMI 35.0-39.9 without comorbidity) 08/25/2016   • Environmental allergies 08/25/2016   • Healthcare maintenance 08/25/2016   • Stress 08/25/2016   • Dermatitis 08/25/2016   • Knee pain, left 08/25/2016       ROS   No fever or chills.  No nausea or vomiting.  No chest pain or palpitations.  No cough or SOB.  No pain with urination or hematuria.  No black or bloody stools.       Objective:     Blood pressure 114/68, pulse 78, temperature 36.3 °C (97.4 °F), resp. rate 16, height 1.588 m (5' 2.5\"), weight 89.8 kg (198 lb), SpO2 93 %. Body mass index is 35.64 kg/m².   Physical Exam:  Well developed, well nourished.  Alert, oriented in no acute distress.  Eye contact is good, speech goal directed, affect calm  Eyes: conjunctiva non-injected, sclera non-icteric.  Neck Supple.  No adenopathy or masses in the neck or supraclavicular regions. No thyromegaly  Lungs: clear to auscultation bilaterally with good excursion. No wheezes or rhonchi  CV: regular rate and rhythm. No murmur  Ext: no edema, color normal, vascularity normal, temperature " normal  No calf tenderness        Assessment and Plan:   The following treatment plan was discussed    1. Cervix abnormality  Patient is currently scheduled for surgery. GYN is doing preop clearance    2. Leg cramps  Recommend increasing her magnesium and calcium. Patient education materials given with foods that are rich in these nutrients. Follow-up as needed    Any change or worsening of signs or symptoms, patient encouraged to follow-up or report to the emergency room for further evaluation. Patient understands and agrees.    Followup: Return if symptoms worsen or fail to improve.

## 2018-05-04 ENCOUNTER — HOSPITAL ENCOUNTER (OUTPATIENT)
Facility: MEDICAL CENTER | Age: 63
End: 2018-05-04
Attending: OBSTETRICS & GYNECOLOGY | Admitting: OBSTETRICS & GYNECOLOGY
Payer: COMMERCIAL

## 2018-05-04 VITALS
OXYGEN SATURATION: 96 % | HEIGHT: 62 IN | BODY MASS INDEX: 36.15 KG/M2 | HEART RATE: 68 BPM | RESPIRATION RATE: 16 BRPM | WEIGHT: 196.43 LBS | TEMPERATURE: 97.6 F

## 2018-05-04 PROCEDURE — 160048 HCHG OR STATISTICAL LEVEL 1-5: Performed by: OBSTETRICS & GYNECOLOGY

## 2018-05-04 PROCEDURE — 160035 HCHG PACU - 1ST 60 MINS PHASE I: Performed by: OBSTETRICS & GYNECOLOGY

## 2018-05-04 PROCEDURE — 700111 HCHG RX REV CODE 636 W/ 250 OVERRIDE (IP)

## 2018-05-04 PROCEDURE — 160036 HCHG PACU - EA ADDL 30 MINS PHASE I: Performed by: OBSTETRICS & GYNECOLOGY

## 2018-05-04 PROCEDURE — 502587 HCHG PACK, D&C: Performed by: OBSTETRICS & GYNECOLOGY

## 2018-05-04 PROCEDURE — 88305 TISSUE EXAM BY PATHOLOGIST: CPT | Mod: 59

## 2018-05-04 PROCEDURE — 502240 HCHG MISC OR SUPPLY RC 0272: Performed by: OBSTETRICS & GYNECOLOGY

## 2018-05-04 PROCEDURE — 160041 HCHG SURGERY MINUTES - EA ADDL 1 MIN LEVEL 4: Performed by: OBSTETRICS & GYNECOLOGY

## 2018-05-04 PROCEDURE — 160009 HCHG ANES TIME/MIN: Performed by: OBSTETRICS & GYNECOLOGY

## 2018-05-04 PROCEDURE — 500448 HCHG DRESSING, TELFA 3X4: Performed by: OBSTETRICS & GYNECOLOGY

## 2018-05-04 PROCEDURE — 160029 HCHG SURGERY MINUTES - 1ST 30 MINS LEVEL 4: Performed by: OBSTETRICS & GYNECOLOGY

## 2018-05-04 PROCEDURE — 160002 HCHG RECOVERY MINUTES (STAT): Performed by: OBSTETRICS & GYNECOLOGY

## 2018-05-04 RX ORDER — METHYLERGONOVINE MALEATE 0.2 MG/ML
INJECTION INTRAVENOUS
Status: DISCONTINUED
Start: 2018-05-04 | End: 2018-05-04 | Stop reason: HOSPADM

## 2018-05-04 RX ORDER — ACETAMINOPHEN 325 MG/1
650 TABLET ORAL EVERY 6 HOURS
Status: DISCONTINUED | OUTPATIENT
Start: 2018-05-04 | End: 2018-05-04 | Stop reason: HOSPADM

## 2018-05-04 RX ORDER — IBUPROFEN 400 MG/1
800 TABLET ORAL
Status: DISCONTINUED | OUTPATIENT
Start: 2018-05-04 | End: 2018-05-04 | Stop reason: HOSPADM

## 2018-05-04 RX ORDER — BUPIVACAINE HYDROCHLORIDE 2.5 MG/ML
INJECTION, SOLUTION EPIDURAL; INFILTRATION; INTRACAUDAL
Status: DISCONTINUED
Start: 2018-05-04 | End: 2018-05-04 | Stop reason: HOSPADM

## 2018-05-04 RX ORDER — EPINEPHRINE 1 MG/ML
INJECTION INTRAMUSCULAR; INTRAVENOUS; SUBCUTANEOUS
Status: DISCONTINUED
Start: 2018-05-04 | End: 2018-05-04 | Stop reason: HOSPADM

## 2018-05-04 RX ORDER — SODIUM CHLORIDE, SODIUM LACTATE, POTASSIUM CHLORIDE, CALCIUM CHLORIDE 600; 310; 30; 20 MG/100ML; MG/100ML; MG/100ML; MG/100ML
INJECTION, SOLUTION INTRAVENOUS CONTINUOUS
Status: DISCONTINUED | OUTPATIENT
Start: 2018-05-04 | End: 2018-05-04 | Stop reason: HOSPADM

## 2018-05-04 RX ORDER — ONDANSETRON 2 MG/ML
4 INJECTION INTRAMUSCULAR; INTRAVENOUS EVERY 6 HOURS PRN
Status: DISCONTINUED | OUTPATIENT
Start: 2018-05-04 | End: 2018-05-04 | Stop reason: HOSPADM

## 2018-05-04 RX ADMIN — SODIUM CHLORIDE, SODIUM LACTATE, POTASSIUM CHLORIDE, CALCIUM CHLORIDE: 600; 310; 30; 20 INJECTION, SOLUTION INTRAVENOUS at 08:15

## 2018-05-04 ASSESSMENT — PAIN SCALES - GENERAL
PAINLEVEL_OUTOF10: 0

## 2018-05-04 NOTE — OP REPORT
DATE OF SERVICE:  05/04/2018    PREOPERATIVE DIAGNOSES:  Postmenopausal bleeding with cervical/endometrial   huge polyp.    POSTOPERATIVE DIAGNOSES:  Endometrial polyp, cervical polyp.    PROCEDURES PERFORMED:  Hysteroscopy with MyoSure polypectomy and dilatation   and curettage.    ANESTHESIA:  General anesthesia, endotracheal intubation.    ANESTHESIOLOGIST:  Mono Palomo MD    SURGEON:  Lise Perrin MD.    ESTIMATED BLOOD LOSS:  50 mL    DESCRIPTION OF PROCEDURE:  Patient was consented and taken to the operating   room, general anesthesia was induced without difficulty.  The patient was   placed in dorsal lithotomy position, was prepped and draped in the normal   sterile fashion.  Her bladder was drained with red Moncada catheter, which   produced approximately 100 mL of clear yellow urine.  Bimanual examination was   performed by me.  Uterus was found to be anteverted, mobile, normal size.    The cervix showed a huge polyp, hanging out from the cervical os up to the   vaginal introitus with a long pedicle.  A weighted speculum was placed in the   posterior aspect of the vagina.  Anterior lip of the cervix was grasped with a   Vulsellum tenaculum.  Uterus was sounded to 7 cm.  Cervix was serially   dilated with up to #8 Hegar dilator.  The Myosure hysteroscope was passed   through the cervical os under direct visualization entered into the   endometrial cavity.  All the walls of the uterine cavity and both the uterine   ostia were visualized.  Fundal polyps from the left fundus of the uterus, two   of them was noted and the huge polyp hanging out from the vagina was from the   right anterior cervical more closer to the external os.  MyoSure tissue   retrieval device was advanced through the scope and under direct visualization   was passed just at the base of the polyp and the blades were directed towards   the base of the polyp and was activated, and both the endometrial polyps on   the fundus of the  uterus was shaved off to the lining of the uterus.  Clear   uterine cavity was noted.  Then, the hysteroscope was withdrawn and the   cervical polyp base was noted, and was excised using the MyoSure at the base   and during this dissection of the base, lots of pressure was noted, hence the   Myosure was removed.  The polyp, which was hanging out from the vagina was   held up with the polyp forceps and the rest of the half cutoff pedicle was cut   with scissors and sent for pathology.  The uterine cavity was thoroughly   curette on all walls of the uterine cavity and sent for pathology.    Endocervical curetting was done thoroughly with sharp curette and sent for   pathology.  Myosure scope was introduced again and noted polyps on the   external os, which was curette thoroughly and sharp curettage was felt in all   the walls of the cervical walls and the specimen was sent for pathology.  No   active bleeding was noted.  All the instruments were removed.  The cervix was   visualized at the tenaculum held site and no active bleeding was noted.  The   patient was extubated and was transferred to the recovery room under stable   condition.       ____________________________________     MD LUCRETIA Gu / ASHISH    DD:  05/04/2018 10:07:48  DT:  05/04/2018 10:37:22    D#:  [id number]  Job#:  [id number]

## 2018-05-04 NOTE — OR NURSING
0750 pre op complete, iv started, pt uncomfortable in bed, c/o chronic leg pain, in recliner & more comfortable

## 2018-05-04 NOTE — PROGRESS NOTES
3409 Pt transferred to PACU. Report received from OR RN and anesthesia. Appears to have no distress at this time. VS stable, respirations even and unlabored. Lakeshia-pad in place, CDI.    1145 Pt more awake. Up to bathroom - able to void without difficulty. Up to recliner, waiting for patient's brother to discharge. Pt states some minor pain, but declines medication.    1210 Handoff to SALAZAR Feliciano.

## 2018-05-04 NOTE — DISCHARGE INSTRUCTIONS
ACTIVITY: Rest and take it easy for the first 24 hours.  A responsible adult is recommended to remain with you during that time.  It is normal to feel sleepy.  We encourage you to not do anything that requires balance, judgment or coordination.    MILD FLU-LIKE SYMPTOMS ARE NORMAL. YOU MAY EXPERIENCE GENERALIZED MUSCLE ACHES, THROAT IRRITATION, HEADACHE AND/OR SOME NAUSEA.    FOR 24 HOURS DO NOT:  Drive, operate machinery or run household appliances.  Drink beer or alcoholic beverages.   Make important decisions or sign legal documents.    SPECIAL INSTRUCTIONS: Follow instruction handout    DIET: To avoid nausea, slowly advance diet as tolerated, avoiding spicy or greasy foods for the first day.  Add more substantial food to your diet according to your physician's instructions.  Babies can be fed formula or breast milk as soon as they are hungry.  INCREASE FLUIDS AND FIBER TO AVOID CONSTIPATION.    SURGICAL DRESSING/BATHING: *No hot tubs, tub baths, etc until approved by MD**    FOLLOW-UP APPOINTMENT:  A follow-up appointment should be arranged with your doctor *; call to schedule.    You should CALL YOUR PHYSICIAN if you develop:  Fever greater than 101 degrees F.  Pain not relieved by medication, or persistent nausea or vomiting.  Excessive bleeding (blood soaking through dressing) or unexpected drainage from the wound.  Extreme redness or swelling around the incision site, drainage of pus or foul smelling drainage.  Inability to urinate or empty your bladder within 8 hours.  Problems with breathing or chest pain.    You should call 911 if you develop problems with breathing or chest pain.  If you are unable to contact your doctor or surgical center, you should go to the nearest emergency room or urgent care center.  Physician's telephone #: *[phone]**    If any questions arise, call your doctor.  If your doctor is not available, please feel free to call the Surgical Center at [phone].  The Center is open  Monday through Friday from 7AM to 7PM.  You can also call the HEALTH HOTLINE open 24 hours/day, 7 days/week and speak to a nurse at [phone], or toll free at [phone].    A registered nurse may call you a few days after your surgery to see how you are doing after your procedure.    MEDICATIONS: Resume taking daily medication.  Take prescribed pain medication with food.  If no medication is prescribed, you may take non-aspirin pain medication if needed.  PAIN MEDICATION CAN BE VERY CONSTIPATING.  Take a stool softener or laxative such as senokot, pericolace, or milk of magnesia if needed.    Prescription given for **none*.  Last pain medication given at *none**.    If your physician has prescribed pain medication that includes Acetaminophen (Tylenol), do not take additional Acetaminophen (Tylenol) while taking the prescribed medication.    Depression / Suicide Risk    As you are discharged from this [facility] facility, it is important to learn how to keep safe from harming yourself.    Recognize the warning signs:  · Abrupt changes in personality, positive or negative- including increase in energy   · Giving away possessions  · Change in eating patterns- significant weight changes-  positive or negative  · Change in sleeping patterns- unable to sleep or sleeping all the time   · Unwillingness or inability to communicate  · Depression  · Unusual sadness, discouragement and loneliness  · Talk of wanting to die  · Neglect of personal appearance   · Rebelliousness- reckless behavior  · Withdrawal from people/activities they love  · Confusion- inability to concentrate     If you or a loved one observes any of these behaviors or has concerns about self-harm, here's what you can do:  · Talk about it- your feelings and reasons for harming yourself  · Remove any means that you might use to hurt yourself (examples: pills, rope, extension cords, firearm)  · Get professional help from the community (Mental  Health, Substance Abuse, psychological counseling)  · Do not be alone:Call your Safe Contact- someone whom you trust who will be there for you.  · Call your local CRISIS HOTLINE 520-6289 or 448-289-9936  · Call your local Children's Mobile Crisis Response Team [place] (340) 222-8929 or www.[facility]  · Call the toll free National Suicide Prevention Hotlines   · National Suicide Prevention Lifeline 587-228-AIYF (0168)  · National Hope Line Network 800-SUICIDE (111-4866)

## 2018-05-04 NOTE — OR SURGEON
Immediate Post OP Note    PreOp Diagnosis:  Cervical endometrial polyp.    PostOp Diagnosis:  Same.    Procedure(s):  HYSTEROSCOPY WITH MYOSURE/ WITH POLYPECTOMY - Wound Class: Clean Contaminated  DILATION AND CURETTAGE - Wound Class: Clean Contaminated    Surgeon(s):  Lise Perrin M.D.    Anesthesiologist/Type of Anesthesia:  Anesthesiologist: Mono Palomo M.D./General    Surgical Staff:  Circulator: Renuka Nielsen RMAILE; Ana Rodgers R.N.  Scrub Person: Yoandy Arroyo    Specimens removed if any:   endometrial polyp, cervical polyp.   EMc and ECC.    Estimated Blood Loss: 50ml.    Findings:  Normal size uterus sounded to 7cms.    Complications: None.        5/4/2018 10:00 AM Lise Perrin M.D.

## 2018-06-06 ENCOUNTER — OFFICE VISIT (OUTPATIENT)
Dept: MEDICAL GROUP | Facility: MEDICAL CENTER | Age: 63
End: 2018-06-06
Payer: COMMERCIAL

## 2018-06-06 VITALS
DIASTOLIC BLOOD PRESSURE: 60 MMHG | TEMPERATURE: 97.3 F | OXYGEN SATURATION: 93 % | HEIGHT: 63 IN | HEART RATE: 76 BPM | WEIGHT: 193 LBS | SYSTOLIC BLOOD PRESSURE: 102 MMHG | RESPIRATION RATE: 16 BRPM | BODY MASS INDEX: 34.2 KG/M2

## 2018-06-06 DIAGNOSIS — Z13.21 ENCOUNTER FOR VITAMIN DEFICIENCY SCREENING: ICD-10-CM

## 2018-06-06 DIAGNOSIS — E55.9 VITAMIN D DEFICIENCY: ICD-10-CM

## 2018-06-06 DIAGNOSIS — H61.21 IMPACTED CERUMEN, RIGHT EAR: ICD-10-CM

## 2018-06-06 DIAGNOSIS — Z13.29 THYROID DISORDER SCREEN: ICD-10-CM

## 2018-06-06 DIAGNOSIS — R73.01 ELEVATED FASTING GLUCOSE: ICD-10-CM

## 2018-06-06 DIAGNOSIS — E78.2 MIXED HYPERLIPIDEMIA: ICD-10-CM

## 2018-06-06 DIAGNOSIS — E66.9 OBESITY (BMI 30-39.9): ICD-10-CM

## 2018-06-06 PROCEDURE — 99214 OFFICE O/P EST MOD 30 MIN: CPT | Mod: 25 | Performed by: NURSE PRACTITIONER

## 2018-06-06 PROCEDURE — 69210 REMOVE IMPACTED EAR WAX UNI: CPT | Performed by: NURSE PRACTITIONER

## 2018-06-07 NOTE — PROGRESS NOTES
Subjective:     Chief Complaint   Patient presents with   • Otalgia     Shaunna Vieira is a 62 y.o. female here today to discuss:    Impaired fasting glucose  Mildly elevated glucose in the past, working on diet. Last a1c 5.5  No polyuria, polydipsia, vision changes    Mixed hyperlipidemia  h/o elevated LDL, has been able to improve with diet  No regular exercise    Vitamin D deficiency  Inconsistent with supplement    Obesity (BMI 30-39.9)  Weight loss of 5 lbs since last visit     Right ear concern  Right ear feels plugged, hearing slightly decreased. No acute pain, discharge    Current medicines (including changes today)  Current Outpatient Prescriptions   Medication Sig Dispense Refill   • Fexofenadine HCl (ALLEGRA ALLERGY PO) Take  by mouth as needed (allergies).     • Dermatological Products, Misc. (HPR PLUS HYDROGEL) Kit by Apply externally route as needed.     • ketoconazole (NIZORAL) 2 % Cream Apply  to affected area(s) as needed.     • triamcinolone acetonide (KENALOG) 0.1 % Cream Apply  to affected area(s) as needed.     • fluticasone (FLONASE) 50 MCG/ACT nasal spray USE 2 SPRAYS IN EACH NOSTRIL DAILY AS NEEDED 16 g 3   • mupirocin (BACTROBAN) 2 % OINT Apply  to affected area(s) as needed.     • Glucosamine-Chondroit-Vit C-Mn (GLUCOSAMINE CHONDR 1500 COMPLX PO) Take  by mouth every day.     • Ascorbic Acid (VITAMIN C PO) Take  by mouth. Twice weekly     • chlorhexidine (HIBICLENS) 4 % liquid Apply  to affected area(s) 1 time daily as needed.     • Multiple Vitamin (MULTI-VITAMIN DAILY PO) Take  by mouth.       No current facility-administered medications for this visit.      She  has a past medical history of Abnormal uterine bleeding; Abscess; Acne; Anesthesia; Breast mass, right; Colon polyps; Dermatitis; Elevated random blood glucose level; Elevated transaminase level; Environmental and seasonal allergies; GERD (gastroesophageal reflux disease); Hyperlipidemia; Migraine; Obesity;  "Osteoarthritis; Pain (04/19/2018); Snoring; Tinnitus; and Vitamin D deficiency.    ROS included above     Objective:     Blood pressure 102/60, pulse 76, temperature 36.3 °C (97.3 °F), resp. rate 16, height 1.588 m (5' 2.5\"), weight 87.5 kg (193 lb), SpO2 93 %. Body mass index is 34.74 kg/m².     Physical Exam:  General: Alert, oriented in no acute distress.  Eye contact is good, speech is normal, affect calm  HEENT: Oral mucosa pink moist, no lesions. Left TMs gray with good landmarks. R ear canal with cerumen partially occluding. Removed easily with curette by myself.TM gray and reflective without effusion. No lymphadenopathy.  Lungs: clear to auscultation bilaterally, normal effort, no wheeze/ rhonchi/ rales.  CV: regular rate and rhythm, S1, S2, no murmur  Abdomen: soft, nontender  Ext: no edema, color normal, vascularity normal, temperature normal    Assessment and Plan:   The following treatment plan was discussed   1. Mixed hyperlipidemia  Working on diet. Update labs  COMP METABOLIC PANEL    LIPID PROFILE   2. Elevated fasting glucose  Last a1c 5.5. She is working on diet  COMP METABOLIC PANEL    HEMOGLOBIN A1C   3. Thyroid disorder screen  TSH WITH REFLEX TO FT4   4. Encounter for vitamin deficiency screening  VITAMIN D,25 HYDROXY   5. Vitamin D deficiency     6.  7. Obesity (BMI 30-39.9)   Right cerumen impaction 5 lb wegiht loss since last visit.  Cerumen removed using curette at pt request. Well tolerated.       Followup: pending labs         Please note that this dictation was created using voice recognition software. I have worked with consultants from the vendor as well as technical experts from [employer] to optimize the interface. I have made every reasonable attempt to correct obvious errors, but I expect that there are errors of grammar and possibly content that I did not discover before finalizing the note.       "

## 2018-06-07 NOTE — ASSESSMENT & PLAN NOTE
Mildly elevated glucose in the past, working on diet. Last a1c 5.5  No polyuria, polydipsia, vision changes

## 2018-06-09 LAB
25(OH)D3+25(OH)D2 SERPL-MCNC: 34.5 NG/ML (ref 30–100)
ALBUMIN SERPL-MCNC: 4.2 G/DL (ref 3.6–4.8)
ALBUMIN/GLOB SERPL: 1.6 {RATIO} (ref 1.2–2.2)
ALP SERPL-CCNC: 61 IU/L (ref 39–117)
ALT SERPL-CCNC: 33 IU/L (ref 0–32)
AST SERPL-CCNC: 27 IU/L (ref 0–40)
BILIRUB SERPL-MCNC: 0.9 MG/DL (ref 0–1.2)
BUN SERPL-MCNC: 18 MG/DL (ref 8–27)
BUN/CREAT SERPL: 19 (ref 12–28)
CALCIUM SERPL-MCNC: 9.2 MG/DL (ref 8.7–10.3)
CHLORIDE SERPL-SCNC: 100 MMOL/L (ref 96–106)
CHOLEST SERPL-MCNC: 247 MG/DL (ref 100–199)
CO2 SERPL-SCNC: 24 MMOL/L (ref 18–29)
CREAT SERPL-MCNC: 0.95 MG/DL (ref 0.57–1)
GFR SERPLBLD CREATININE-BSD FMLA CKD-EPI: 64 ML/MIN/1.73
GFR SERPLBLD CREATININE-BSD FMLA CKD-EPI: 74 ML/MIN/1.73
GLOBULIN SER CALC-MCNC: 2.7 G/DL (ref 1.5–4.5)
GLUCOSE SERPL-MCNC: 102 MG/DL (ref 65–99)
HBA1C MFR BLD: 5.7 % (ref 4.8–5.6)
HDLC SERPL-MCNC: 52 MG/DL
LABORATORY COMMENT REPORT: ABNORMAL
LDLC SERPL CALC-MCNC: 173 MG/DL (ref 0–99)
POTASSIUM SERPL-SCNC: 4 MMOL/L (ref 3.5–5.2)
PROT SERPL-MCNC: 6.9 G/DL (ref 6–8.5)
SODIUM SERPL-SCNC: 142 MMOL/L (ref 134–144)
TRIGL SERPL-MCNC: 111 MG/DL (ref 0–149)
TSH SERPL DL<=0.005 MIU/L-ACNC: 2.2 UIU/ML (ref 0.45–4.5)
VLDLC SERPL CALC-MCNC: 22 MG/DL (ref 5–40)

## 2018-07-02 ENCOUNTER — OFFICE VISIT (OUTPATIENT)
Dept: MEDICAL GROUP | Facility: MEDICAL CENTER | Age: 63
End: 2018-07-02
Payer: COMMERCIAL

## 2018-07-02 VITALS
HEART RATE: 71 BPM | RESPIRATION RATE: 16 BRPM | DIASTOLIC BLOOD PRESSURE: 70 MMHG | WEIGHT: 192 LBS | OXYGEN SATURATION: 95 % | BODY MASS INDEX: 34.02 KG/M2 | HEIGHT: 63 IN | TEMPERATURE: 97.4 F | SYSTOLIC BLOOD PRESSURE: 118 MMHG

## 2018-07-02 DIAGNOSIS — E78.2 MIXED HYPERLIPIDEMIA: ICD-10-CM

## 2018-07-02 DIAGNOSIS — M79.605 LEFT LEG PAIN: ICD-10-CM

## 2018-07-02 DIAGNOSIS — I78.1 SPIDER VEINS: ICD-10-CM

## 2018-07-02 DIAGNOSIS — R25.2 LEG CRAMPS: ICD-10-CM

## 2018-07-02 PROBLEM — R73.03 PREDIABETES: Status: ACTIVE | Noted: 2018-07-02

## 2018-07-02 PROCEDURE — 99214 OFFICE O/P EST MOD 30 MIN: CPT | Performed by: NURSE PRACTITIONER

## 2018-07-02 ASSESSMENT — PAIN SCALES - GENERAL: PAINLEVEL: 4=SLIGHT-MODERATE PAIN

## 2018-07-02 NOTE — PROGRESS NOTES
Subjective:   Shaunna Vieira is a 62 y.o. female here today for follow-up on labs and to discuss leg pain:      Left leg pain  Has been having persistent left leg pain. Is being seeing by orthopedic MD. Has been trying different topical medications. Had a cortisone injection with orthopedic. Had some relief for about 4 weeks. May get other injection in knee to help. Pain is also in her low back.  She has also been doing stretching and riding a bicycle to stay active.  She has been taking 200 mg of ibuprofen or Excedrin as needed for pain which she states takes the edge off.    Mixed hyperlipidemia  Recent labs show LDL cholesterol at 173.  Patient generally eats a well-balanced diet high in fiber and vegetables.  She does eat some eggs and moderate amounts of coconut oil.  She does not eat fried foods, fast food, rarely eats meat.  She does eat dairy.  She has been tried on a statin in the past but had some muscle pain and elevated CK level, however she states that she had been working out heavily at that time as well and had pulled a muscle the day before getting her lab work done.  After her muscle pain was resolved she had labs rechecked and they had normalized.  She would like to avoid medication at this time and try some dietary changes, however she is open to starting medication if need be.  She denies chest pain or any acute coronary symptoms.       Current medicines (including changes today)  Current Outpatient Prescriptions   Medication Sig Dispense Refill   • Fexofenadine HCl (ALLEGRA ALLERGY PO) Take  by mouth as needed (allergies).     • Dermatological Products, Misc. (HPR PLUS HYDROGEL) Kit by Apply externally route as needed.     • ketoconazole (NIZORAL) 2 % Cream Apply  to affected area(s) as needed.     • triamcinolone acetonide (KENALOG) 0.1 % Cream Apply  to affected area(s) as needed.     • fluticasone (FLONASE) 50 MCG/ACT nasal spray USE 2 SPRAYS IN EACH NOSTRIL DAILY AS NEEDED 16 g 3  "  • mupirocin (BACTROBAN) 2 % OINT Apply  to affected area(s) as needed.     • Glucosamine-Chondroit-Vit C-Mn (GLUCOSAMINE CHONDR 1500 COMPLX PO) Take  by mouth every day.     • Ascorbic Acid (VITAMIN C PO) Take  by mouth. Twice weekly     • chlorhexidine (HIBICLENS) 4 % liquid Apply  to affected area(s) 1 time daily as needed.     • Multiple Vitamin (MULTI-VITAMIN DAILY PO) Take  by mouth.       No current facility-administered medications for this visit.      She  has a past medical history of Abnormal uterine bleeding; Abscess; Acne; Anesthesia; Breast mass, right; Colon polyps; Dermatitis; Elevated random blood glucose level; Elevated transaminase level; Environmental and seasonal allergies; GERD (gastroesophageal reflux disease); Hyperlipidemia; Migraine; Obesity; Osteoarthritis; Pain (04/19/2018); Snoring; Tinnitus; and Vitamin D deficiency.    ROS   No chest pain, no shortness of breath, no abdominal pain  Positive ROS as per HPI.  All other systems reviewed and are negative.     Objective:     Blood pressure 118/70, pulse 71, temperature 36.3 °C (97.4 °F), resp. rate 16, height 1.588 m (5' 2.52\"), weight 87.1 kg (192 lb), SpO2 95 %, not currently breastfeeding. Body mass index is 34.54 kg/m².     Physical Exam:  Constitutional: Alert, no distress.  Skin: Warm, dry, good turgor, no rashes in visible areas.  +Scattered spider veins bilateral lower extremities.  Eye: Equal, round and reactive, conjunctiva clear, lids normal.  ENMT: Lips without lesions, good dentition, oropharynx clear.  Neck: Trachea midline, no masses, no thyromegaly. No cervical or supraclavicular lymphadenopathy  Respiratory: Unlabored respiratory effort, lungs clear to auscultation, no wheezes, no ronchi.  Cardiovascular: Normal S1, S2, no murmur, no edema.  Abdomen: Soft, non-tender, no masses, no hepatosplenomegaly.  Psych: Alert and oriented x3, normal affect and mood.        Assessment and Plan:   The following treatment plan was " discussed    1. Left leg pain  Unstable.  Continue seeing orthopedic specialist.  Follow-up with vein specialist to rule out venous disease causing leg pain as she does have a family history of venous problems.  - REFERRAL TO VASCULAR SURGERY    2. Leg cramps  Unstable.  Follow-up with vascular specialist.  - REFERRAL TO VASCULAR SURGERY    3. Spider veins  Unstable.  Follow-up with vascular specialist  - REFERRAL TO VASCULAR SURGERY    4. Mixed hyperlipidemia  Unstable.  Dietary and lifestyle modifications discussed in depth that appointment.  Handout for dietary changes given.  Recheck lipids in 3 months.  If lipids not improved, likely start statin other than atorvastatin as she had muscle pain with this.  Advised starting OTC fish oil daily.  - LIPID PROFILE; Future      Followup: Return in about 3 months (around 10/2/2018) for Cholesterol, leg pain.    I have placed the below orders and discussed them with an approved delegating provider. The MA is performing the below orders under the direction of Dr. Cast

## 2018-07-02 NOTE — PATIENT INSTRUCTIONS
Some dietary changes you can make to improve your cholesterol are:    -Add healthy fats to your diet such as avocados, nuts (walnuts/almonds), fish high in omega 3's (trout, salmon, albacore, halibut)    -Increase dietary fiber such as whole grain bread, oatmeal,  oats, oat bran, barley, peas, yams, sweet potatoes and other potatoes, as well as legumes or beans, such as lebron beans, black beans, garbanzo beans, and peas. Vegetables rich in soluble fiber include carrots, [place] sprouts, beets, okra, and eggplant.  Or adding a dietary fiber supplement.     -Decrease trans fats and saturated fats, these are found in butter, red meat, dairy, organ meats, and shellfish.      -Decrease sugar and carbohydrate intake, including alcohol.     -Exercise and weight loss as well.

## 2018-07-02 NOTE — ASSESSMENT & PLAN NOTE
Recent labs show LDL cholesterol at 173.  Patient generally eats a well-balanced diet high in fiber and vegetables.  She does eat some eggs and moderate amounts of coconut oil.  She does not eat fried foods, fast food, rarely eats meat.  She does eat dairy.  She has been tried on a statin in the past but had some muscle pain and elevated CK level, however she states that she had been working out heavily at that time as well and had pulled a muscle the day before getting her lab work done.  After her muscle pain was resolved she had labs rechecked and they had normalized.  She would like to avoid medication at this time and try some dietary changes, however she is open to starting medication if need be.  She denies chest pain or any acute coronary symptoms.

## 2018-09-10 ENCOUNTER — TELEPHONE (OUTPATIENT)
Dept: MEDICAL GROUP | Facility: MEDICAL CENTER | Age: 63
End: 2018-09-10

## 2018-09-13 ENCOUNTER — TELEPHONE (OUTPATIENT)
Dept: MEDICAL GROUP | Facility: MEDICAL CENTER | Age: 63
End: 2018-09-13

## 2018-09-13 DIAGNOSIS — R79.89 LFT ELEVATION: ICD-10-CM

## 2018-09-13 DIAGNOSIS — R73.03 PREDIABETES: ICD-10-CM

## 2018-09-24 LAB
ALBUMIN SERPL-MCNC: 4.3 G/DL (ref 3.6–4.8)
ALBUMIN/GLOB SERPL: 1.7 {RATIO} (ref 1.2–2.2)
ALP SERPL-CCNC: 66 IU/L (ref 39–117)
ALT SERPL-CCNC: 19 IU/L (ref 0–32)
AST SERPL-CCNC: 17 IU/L (ref 0–40)
BILIRUB SERPL-MCNC: 1 MG/DL (ref 0–1.2)
BUN SERPL-MCNC: 15 MG/DL (ref 8–27)
BUN/CREAT SERPL: 15 (ref 12–28)
CALCIUM SERPL-MCNC: 9.3 MG/DL (ref 8.7–10.3)
CHLORIDE SERPL-SCNC: 101 MMOL/L (ref 96–106)
CHOLEST SERPL-MCNC: 204 MG/DL (ref 100–199)
CO2 SERPL-SCNC: 22 MMOL/L (ref 20–29)
CREAT SERPL-MCNC: 1.02 MG/DL (ref 0.57–1)
GLOBULIN SER CALC-MCNC: 2.6 G/DL (ref 1.5–4.5)
GLUCOSE SERPL-MCNC: 97 MG/DL (ref 65–99)
HBA1C MFR BLD: 5.6 % (ref 4.8–5.6)
HDLC SERPL-MCNC: 42 MG/DL
IF AFRICAN AMERICAN  100797: 68 ML/MIN/1.73
IF NON AFRICAN AMER 100791: 59 ML/MIN/1.73
LABORATORY COMMENT REPORT: ABNORMAL
LDLC SERPL CALC-MCNC: 133 MG/DL (ref 0–99)
POTASSIUM SERPL-SCNC: 4.2 MMOL/L (ref 3.5–5.2)
PROT SERPL-MCNC: 6.9 G/DL (ref 6–8.5)
SODIUM SERPL-SCNC: 139 MMOL/L (ref 134–144)
TRIGL SERPL-MCNC: 145 MG/DL (ref 0–149)
VLDLC SERPL CALC-MCNC: 29 MG/DL (ref 5–40)

## 2018-09-26 ENCOUNTER — TELEPHONE (OUTPATIENT)
Dept: MEDICAL GROUP | Facility: MEDICAL CENTER | Age: 63
End: 2018-09-26

## 2018-09-26 NOTE — TELEPHONE ENCOUNTER
----- Message from DUY Herrera sent at 9/25/2018  5:07 PM PDT -----  Please notify patient that her fasting blood sugar and diabetes testing have improved since 3 months ago. Her Cholesterol has also significantly improved. Her LDL, bad cholesterol is still slightly elevated at 133, but has dropped from 173, great job!    We will continue to monitor these regularly to make sure it is continuing to come down.     Please find out if patient has been seen by gynecology.     DUY Herrera

## 2018-10-01 ENCOUNTER — OFFICE VISIT (OUTPATIENT)
Dept: MEDICAL GROUP | Facility: MEDICAL CENTER | Age: 63
End: 2018-10-01
Payer: COMMERCIAL

## 2018-10-01 VITALS
BODY MASS INDEX: 33.31 KG/M2 | HEART RATE: 69 BPM | DIASTOLIC BLOOD PRESSURE: 72 MMHG | TEMPERATURE: 97.3 F | WEIGHT: 188 LBS | SYSTOLIC BLOOD PRESSURE: 122 MMHG | RESPIRATION RATE: 20 BRPM | OXYGEN SATURATION: 94 % | HEIGHT: 63 IN

## 2018-10-01 DIAGNOSIS — E78.2 MIXED HYPERLIPIDEMIA: ICD-10-CM

## 2018-10-01 DIAGNOSIS — R73.01 IMPAIRED FASTING GLUCOSE: ICD-10-CM

## 2018-10-01 DIAGNOSIS — N28.9 ABNORMAL RENAL FUNCTION: ICD-10-CM

## 2018-10-01 DIAGNOSIS — E66.9 CLASS 1 OBESITY WITHOUT SERIOUS COMORBIDITY WITH BODY MASS INDEX (BMI) OF 33.0 TO 33.9 IN ADULT, UNSPECIFIED OBESITY TYPE: ICD-10-CM

## 2018-10-01 PROCEDURE — 99214 OFFICE O/P EST MOD 30 MIN: CPT | Performed by: FAMILY MEDICINE

## 2018-10-01 NOTE — PATIENT INSTRUCTIONS
Prediabetes Eating Plan  Prediabetes--also called impaired glucose tolerance or impaired fasting glucose--is a condition that causes blood sugar (blood glucose) levels to be higher than normal. Following a healthy diet can help to keep prediabetes under control. It can also help to lower the risk of type 2 diabetes and heart disease, which are increased in people who have prediabetes. Along with regular exercise, a healthy diet:  · Promotes weight loss.  · Helps to control blood sugar levels.  · Helps to improve the way that the body uses insulin.  What do I need to know about this eating plan?  · Use the glycemic index (GI) to plan your meals. The index tells you how quickly a food will raise your blood sugar. Choose low-GI foods. These foods take a longer time to raise blood sugar.  · Pay close attention to the amount of carbohydrates in the food that you eat. Carbohydrates increase blood sugar levels.  · Keep track of how many calories you take in. Eating the right amount of calories will help you to achieve a healthy weight. Losing about 7 percent of your starting weight can help to prevent type 2 diabetes.  · You may want to follow a Mediterranean diet. This diet includes a lot of vegetables, lean meats or fish, whole grains, fruits, and healthy oils and fats.  What foods can I eat?  Grains   Whole grains, such as whole-wheat or whole-grain breads, crackers, cereals, and pasta. Unsweetened oatmeal. Bulgur. Barley. Quinoa. Brown rice. Corn or whole-wheat flour tortillas or taco shells.  Vegetables   Lettuce. Spinach. Peas. Beets. Cauliflower. Cabbage. Broccoli. Carrots. Tomatoes. Squash. Eggplant. Herbs. Peppers. Onions. Cucumbers. [place] sprouts.  Fruits   Berries. Bananas. Apples. Oranges. Grapes. Papaya. Toi. Pomegranate. Kiwi. Grapefruit. Cherries.  Meats and Other Protein Sources   Seafood. Lean meats, such as chicken and turkey or lean cuts of pork and beef. Tofu. Eggs. Nuts. Beans.  Dairy   Low-fat or  fat-free dairy products, such as yogurt, cottage cheese, and cheese.  Beverages   Water. Tea. Coffee. Sugar-free or diet soda. [place]ong water. Milk. Milk alternatives, such as soy or almond milk.  Condiments   Mustard. Relish. Low-fat, low-sugar ketchup. Low-fat, low-sugar barbecue sauce. Low-fat or fat-free mayonnaise.  Sweets and Desserts   Sugar-free or low-fat pudding. Sugar-free or low-fat ice cream and other frozen treats.  Fats and Oils   Avocado. Walnuts. Olive oil.  The items listed above may not be a complete list of recommended foods or beverages. Contact your dietitian for more options.   What foods are not recommended?  Grains   Refined white flour and flour products, such as bread, pasta, snack foods, and cereals.  Beverages   Sweetened drinks, such as sweet iced tea and soda.  Sweets and Desserts   Baked goods, such as cake, cupcakes, pastries, cookies, and cheesecake.  The items listed above may not be a complete list of foods and beverages to avoid. Contact your dietitian for more information.   This information is not intended to replace advice given to you by your health care provider. Make sure you discuss any questions you have with your health care provider.  Document Released: 05/03/2016 Document Revised: 05/25/2017 Document Reviewed: 01/13/2016  [employer] Interactive Patient Education © 2017 [employer]  Fat and Cholesterol Restricted Diet  High levels of fat and cholesterol in your blood may lead to various health problems, such as diseases of the heart, blood vessels, gallbladder, liver, and pancreas. Fats are concentrated sources of energy that come in various forms. Certain types of fat, including saturated fat, may be harmful in excess. Cholesterol is a substance needed by your body in small amounts. Your body makes all the cholesterol it needs. Excess cholesterol comes from the food you eat.  When you have high levels of cholesterol and saturated fat in your blood, health problems can  "develop because the excess fat and cholesterol will gather along the walls of your blood vessels, causing them to narrow. Choosing the right foods will help you control your intake of fat and cholesterol. This will help keep the levels of these substances in your blood within normal limits and reduce your risk of disease.  WHAT IS MY PLAN?  Your health care provider recommends that you:  · Get no more than __________ % of the total calories in your daily diet from fat.  · Limit your intake of saturated fat to less than ______% of your total calories each day.  · Limit the amount of cholesterol in your diet to less than _________mg per day.  WHAT TYPES OF FAT SHOULD I CHOOSE?  · Choose healthy fats more often. Choose monounsaturated and polyunsaturated fats, such as olive and canola oil, flaxseeds, walnuts, almonds, and seeds.  · Eat more omega-3 fats. Good choices include salmon, mackerel, sardines, tuna, flaxseed oil, and ground flaxseeds. Aim to eat fish at least two times a week.  · Limit saturated fats. Saturated fats are primarily found in animal products, such as meats, butter, and cream. Plant sources of saturated fats include palm oil, palm kernel oil, and coconut oil.  · Avoid foods with partially hydrogenated oils in them. These contain trans fats. Examples of foods that contain trans fats are stick margarine, some tub margarines, cookies, crackers, and other baked goods.  WHAT GENERAL GUIDELINES DO I NEED TO FOLLOW?  These guidelines for healthy eating will help you control your intake of fat and cholesterol:  · Check food labels carefully to identify foods with trans fats or high amounts of saturated fat.  · Fill one half of your plate with vegetables and green salads.  · Fill one fourth of your plate with whole grains. Look for the word \"whole\" as the first word in the ingredient list.  · Fill one fourth of your plate with lean protein foods.  · Limit fruit to two servings a day. Choose fruit instead of " juice.  · Eat more foods that contain soluble fiber. Examples of foods that contain this type of fiber are apples, broccoli, carrots, beans, peas, and barley. Aim to get 20-30 g of fiber per day.  · Eat more home-cooked food and less restaurant, buffet, and fast food.  · Limit or avoid alcohol.  · Limit foods high in starch and sugar.  · Limit fried foods.  · Cook foods using methods other than frying. Baking, boiling, grilling, and broiling are all great options.  · Lose weight if you are overweight. Losing just 5-10% of your initial body weight can help your overall health and prevent diseases such as diabetes and heart disease.  WHAT FOODS CAN I EAT?  Grains  Whole grains, such as whole wheat or whole grain breads, crackers, cereals, and pasta. Unsweetened oatmeal, bulgur, barley, quinoa, or brown rice. Corn or whole wheat flour tortillas.  Vegetables  Fresh or frozen vegetables (raw, steamed, roasted, or grilled). Green salads.  Fruits  All fresh, canned (in natural juice), or frozen fruits.  Meat and Other Protein Products  Ground beef (85% or leaner), grass-fed beef, or beef trimmed of fat. Skinless chicken or turkey. Ground chicken or turkey. Pork trimmed of fat. All fish and seafood. Eggs. Dried beans, peas, or lentils. Unsalted nuts or seeds. Unsalted canned or dry beans.  Dairy  Low-fat dairy products, such as skim or 1% milk, 2% or reduced-fat cheeses, low-fat ricotta or cottage cheese, or plain low-fat yogurt.  Fats and Oils  Tub margarines without trans fats. Light or reduced-fat mayonnaise and salad dressings. Avocado. Olive, canola, sesame, or safflower oils. Natural peanut or almond butter (choose ones without added sugar and oil).  The items listed above may not be a complete list of recommended foods or beverages. Contact your dietitian for more options.  WHAT FOODS ARE NOT RECOMMENDED?  Grains  White bread. White pasta. White rice. Cornbread. Bagels, pastries, and croissants. Crackers that contain  trans fat.  Vegetables  White potatoes. Corn. Creamed or fried vegetables. Vegetables in a cheese sauce.  Fruits  Dried fruits. Canned fruit in light or heavy syrup. Fruit juice.  Meat and Other Protein Products  Fatty cuts of meat. Ribs, chicken wings, cristina, sausage, bologna, salami, chitterlings, fatback, hot dogs, bratwurst, and packaged luncheon meats. Liver and organ meats.  Dairy  Whole or 2% milk, cream, half-and-half, and cream cheese. Whole milk cheeses. Whole-fat or sweetened yogurt. Full-fat cheeses. Nondairy creamers and whipped toppings. Processed cheese, cheese spreads, or cheese curds.  Sweets and Desserts  Corn syrup, sugars, honey, and molasses. Candy. Jam and jelly. Syrup. Sweetened cereals. Cookies, pies, cakes, donuts, muffins, and ice cream.  Fats and Oils  Butter, stick margarine, lard, shortening, ghee, or cristina fat. Coconut, palm kernel, or palm oils.  Beverages  Alcohol. Sweetened drinks (such as sodas, lemonade, and fruit drinks or punches).  The items listed above may not be a complete list of foods and beverages to avoid. Contact your dietitian for more information.     This information is not intended to replace advice given to you by your health care provider. Make sure you discuss any questions you have with your health care provider.     Document Released: 12/18/2006 Document Revised: 01/08/2016 Document Reviewed: 03/18/2015  [employer] Interactive Patient Education ©2016 [employer]

## 2018-10-08 NOTE — PROGRESS NOTES
Subjective:     Chief Complaint   Patient presents with   • Follow-Up     labs      Shaunna is a regular patient of GUILLERMO Brunson Ness Vieira is a 62 y.o. female here today for evaluation and management of:  She is here today to discuss her recent lab results.  Impaired fasting glucose  Patient has a history of impaired fasting glucose.  She denies any polyuria or polyphagia.       Allergies   Allergen Reactions   • Morphine Shortness of Breath     Also get hallucinations   • Nystatin Rash     .   • Sulfa Drugs Unspecified     Pt doesn't know what happens, had as a child   • Pravastatin        Current medicines (including changes today)  Current Outpatient Prescriptions   Medication Sig Dispense Refill   • Wound Cleansers (LEVICYN DERMAL SPRAY EX) by Apply externally route.     • Fexofenadine HCl (ALLEGRA ALLERGY PO) Take  by mouth as needed (allergies).     • Dermatological Products, Misc. (HPR PLUS HYDROGEL) Kit by Apply externally route as needed.     • ketoconazole (NIZORAL) 2 % Cream Apply  to affected area(s) as needed.     • triamcinolone acetonide (KENALOG) 0.1 % Cream Apply  to affected area(s) as needed.     • fluticasone (FLONASE) 50 MCG/ACT nasal spray USE 2 SPRAYS IN EACH NOSTRIL DAILY AS NEEDED 16 g 3   • mupirocin (BACTROBAN) 2 % OINT Apply  to affected area(s) as needed.     • Glucosamine-Chondroit-Vit C-Mn (GLUCOSAMINE CHONDR 1500 COMPLX PO) Take  by mouth every day.     • Ascorbic Acid (VITAMIN C PO) Take  by mouth. Twice weekly     • chlorhexidine (HIBICLENS) 4 % liquid Apply  to affected area(s) 1 time daily as needed.     • Multiple Vitamin (MULTI-VITAMIN DAILY PO) Take  by mouth.       No current facility-administered medications for this visit.        She  has a past medical history of Abnormal uterine bleeding; Abscess; Acne; Anesthesia; Breast mass, right; Colon polyps; Dermatitis; Elevated random blood glucose level; Elevated transaminase level; Environmental and seasonal  "allergies; GERD (gastroesophageal reflux disease); Hyperlipidemia; Migraine; Obesity; Osteoarthritis; Pain (04/19/2018); Snoring; Tinnitus; and Vitamin D deficiency.    Patient Active Problem List    Diagnosis Date Noted   • Abnormal renal function 10/01/2018   • Left leg pain 07/02/2018   • Spider veins 07/02/2018   • Prediabetes 07/02/2018   • Leg cramps 04/24/2018   • Health care maintenance 03/07/2018   • Abnormal uterine bleeding 03/07/2018   • Cervix abnormality 03/07/2018   • Colon polyps 08/25/2016   • Impaired fasting glucose 08/25/2016   • Vitamin D deficiency 08/25/2016   • Mixed hyperlipidemia 08/25/2016   • Gastroesophageal reflux disease without esophagitis 08/25/2016   • Tinnitus of both ears 08/25/2016   • Class 1 obesity without serious comorbidity with body mass index (BMI) of 33.0 to 33.9 in adult 08/25/2016   • Environmental allergies 08/25/2016   • Healthcare maintenance 08/25/2016   • Stress 08/25/2016   • Dermatitis 08/25/2016   • Knee pain, left 08/25/2016       ROS   No fever or chills.  No nausea or vomiting.  No chest pain or palpitations.  No cough or SOB.  No pain with urination or hematuria.  No black or bloody stools.       Objective:     Blood pressure 122/72, pulse 69, temperature 36.3 °C (97.3 °F), temperature source Temporal, resp. rate 20, height 1.594 m (5' 2.75\"), weight 85.3 kg (188 lb), SpO2 94 %, not currently breastfeeding. Body mass index is 33.57 kg/m².   Physical Exam:  Well developed, well nourished.  Alert, oriented in no acute distress.  Eye contact is good, speech goal directed, affect calm  Eyes: conjunctiva non-injected, sclera non-icteric.  Neck Supple.  No adenopathy or masses in the neck or supraclavicular regions. No thyromegaly  Lungs: clear to auscultation bilaterally with good excursion. No wheezes or rhonchi  CV: regular rate and rhythm. No murmur  Abdomen: soft, nontender, no masses or organomegaly.  No rebound or guarding          Assessment and Plan: "   The following treatment plan was discussed    1. Mixed hyperlipidemia  Dietary counseling done.  Patient would like to try this first before medications.  Low-fat diet information given  - LIPID PROFILE; Future    2. Class 1 obesity without serious comorbidity with body mass index (BMI) of 33.0 to 33.9 in adult, unspecified obesity type  Encourage weight loss for overall health    3. Abnormal renal function  Avoid NSAIDs.  Recheck labs in 3 months  - COMP METABOLIC PANEL; Future    4. Impaired fasting glucose  Encourage weight loss.  Dietary counseling done.  Recheck labs in 3 months  - COMP METABOLIC PANEL; Future  - HEMOGLOBIN A1C; Future    Any change or worsening of signs or symptoms, patient encouraged to follow-up or report to the emergency room for further evaluation. Patient understands and agrees.    Followup: Return in about 4 months (around 2/1/2019).

## 2019-01-31 LAB
ALBUMIN SERPL-MCNC: 4.4 G/DL (ref 3.6–4.8)
ALBUMIN/GLOB SERPL: 1.8 {RATIO} (ref 1.2–2.2)
ALP SERPL-CCNC: 64 IU/L (ref 39–117)
ALT SERPL-CCNC: 23 IU/L (ref 0–32)
AST SERPL-CCNC: 20 IU/L (ref 0–40)
BILIRUB SERPL-MCNC: 0.8 MG/DL (ref 0–1.2)
BUN SERPL-MCNC: 14 MG/DL (ref 8–27)
BUN/CREAT SERPL: 16 (ref 12–28)
CALCIUM SERPL-MCNC: 9.5 MG/DL (ref 8.7–10.3)
CHLORIDE SERPL-SCNC: 104 MMOL/L (ref 96–106)
CHOLEST SERPL-MCNC: 235 MG/DL (ref 100–199)
CO2 SERPL-SCNC: 24 MMOL/L (ref 20–29)
CREAT SERPL-MCNC: 0.88 MG/DL (ref 0.57–1)
GLOBULIN SER CALC-MCNC: 2.5 G/DL (ref 1.5–4.5)
GLUCOSE SERPL-MCNC: 98 MG/DL (ref 65–99)
HBA1C MFR BLD: 5.6 % (ref 4.8–5.6)
HDLC SERPL-MCNC: 44 MG/DL
LABORATORY COMMENT REPORT: ABNORMAL
LDLC SERPL CALC-MCNC: 167 MG/DL (ref 0–99)
POTASSIUM SERPL-SCNC: 4.2 MMOL/L (ref 3.5–5.2)
PROT SERPL-MCNC: 6.9 G/DL (ref 6–8.5)
SODIUM SERPL-SCNC: 142 MMOL/L (ref 134–144)
TRIGL SERPL-MCNC: 119 MG/DL (ref 0–149)
VLDLC SERPL CALC-MCNC: 24 MG/DL (ref 5–40)

## 2019-02-20 ENCOUNTER — OFFICE VISIT (OUTPATIENT)
Dept: MEDICAL GROUP | Facility: MEDICAL CENTER | Age: 64
End: 2019-02-20
Payer: COMMERCIAL

## 2019-02-20 VITALS
WEIGHT: 184 LBS | OXYGEN SATURATION: 100 % | BODY MASS INDEX: 32.6 KG/M2 | RESPIRATION RATE: 16 BRPM | SYSTOLIC BLOOD PRESSURE: 118 MMHG | HEART RATE: 88 BPM | DIASTOLIC BLOOD PRESSURE: 80 MMHG | HEIGHT: 63 IN | TEMPERATURE: 97.8 F

## 2019-02-20 DIAGNOSIS — J06.9 VIRAL UPPER RESPIRATORY TRACT INFECTION: ICD-10-CM

## 2019-02-20 PROCEDURE — 99214 OFFICE O/P EST MOD 30 MIN: CPT | Performed by: NURSE PRACTITIONER

## 2019-02-20 RX ORDER — IPRATROPIUM BROMIDE 42 UG/1
2 SPRAY, METERED NASAL 4 TIMES DAILY
Qty: 1 BOTTLE | Refills: 0 | Status: SHIPPED | OUTPATIENT
Start: 2019-02-20 | End: 2022-03-17

## 2019-02-20 NOTE — PROGRESS NOTES
Subjective:   Shaunna Vieira is a 63 y.o. female here today for URI    Viral upper respiratory tract infection  Recently got over a cold, started about 4 days ago. Having persistent mucous, cough, and sore throat on the right side. Has been taking Mucinex day and night cold and sinus. Has been having chills, no known fevers. Symptoms are better today.          Current medicines (including changes today)  Current Outpatient Prescriptions   Medication Sig Dispense Refill   • ipratropium (ATROVENT) 0.06 % Solution Spray 2 Sprays in nose 4 times a day. 1 Bottle 0   • Wound Cleansers (LEVICYN DERMAL SPRAY EX) by Apply externally route.     • Fexofenadine HCl (ALLEGRA ALLERGY PO) Take  by mouth as needed (allergies).     • Dermatological Products, Misc. (HPR PLUS HYDROGEL) Kit by Apply externally route as needed.     • ketoconazole (NIZORAL) 2 % Cream Apply  to affected area(s) as needed.     • triamcinolone acetonide (KENALOG) 0.1 % Cream Apply  to affected area(s) as needed.     • fluticasone (FLONASE) 50 MCG/ACT nasal spray USE 2 SPRAYS IN EACH NOSTRIL DAILY AS NEEDED 16 g 3   • mupirocin (BACTROBAN) 2 % OINT Apply  to affected area(s) as needed.     • Glucosamine-Chondroit-Vit C-Mn (GLUCOSAMINE CHONDR 1500 COMPLX PO) Take  by mouth every day.     • Ascorbic Acid (VITAMIN C PO) Take  by mouth. Twice weekly     • chlorhexidine (HIBICLENS) 4 % liquid Apply  to affected area(s) 1 time daily as needed.     • Multiple Vitamin (MULTI-VITAMIN DAILY PO) Take  by mouth.       No current facility-administered medications for this visit.      She  has a past medical history of Abnormal uterine bleeding; Abscess; Acne; Anesthesia; Breast mass, right; Colon polyps; Dermatitis; Elevated random blood glucose level; Elevated transaminase level; Environmental and seasonal allergies; GERD (gastroesophageal reflux disease); Hyperlipidemia; Migraine; Obesity; Osteoarthritis; Pain (04/19/2018); Snoring; Tinnitus; and Vitamin D  "deficiency.    ROS   No chest pain, no shortness of breath, no abdominal pain  Positive ROS as per HPI.  All other systems reviewed and are negative.     Objective:     Blood pressure 118/80, pulse 88, temperature 36.6 °C (97.8 °F), temperature source Temporal, resp. rate 16, height 1.594 m (5' 2.76\"), weight 83.5 kg (184 lb), SpO2 100 %, not currently breastfeeding. Body mass index is 32.85 kg/m².   Physical Exam:  Constitutional: Alert, no distress.  Skin: Warm, dry, good turgor, no rashes in visible areas.  Eye: Equal, round and reactive, conjunctiva clear, lids normal.  ENMT: Lips without lesions, good dentition, Pharyngeal erythema with clear post nasal drainage. TMs WNL bilaterally. Mucosal edema  Neck: No cervical or supraclavicular lymphadenopathy  Respiratory: Unlabored respiratory effort, lungs clear to auscultation, no wheezes, no ronchi.  Cardiovascular: Normal S1, S2, no murmu  Psych: Alert and oriented x3, normal affect and mood.      Assessment and Plan:   The following treatment plan was discussed    1. Viral upper respiratory tract infection  Unstable  Improving  Advised supportive care, OTC medications, start flonase daily, add atrovent nasal  - ipratropium (ATROVENT) 0.06 % Solution; Spray 2 Sprays in nose 4 times a day.  Dispense: 1 Bottle; Refill: 0      Followup: Return if symptoms worsen or fail to improve.    I have placed the below orders and discussed them with an approved delegating provider. The MA is performing the below orders under the direction of Dr. Cast           "

## 2019-02-20 NOTE — ASSESSMENT & PLAN NOTE
Recently got over a cold, started about 4 days ago. Having persistent mucous, cough, and sore throat on the right side. Has been taking Mucinex day and night cold and sinus. Has been having chills, no known fevers. Symptoms are better today.

## 2019-02-22 ENCOUNTER — APPOINTMENT (OUTPATIENT)
Dept: RADIOLOGY | Facility: MEDICAL CENTER | Age: 64
End: 2019-02-22
Attending: NURSE PRACTITIONER
Payer: COMMERCIAL
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
## 2019-03-08 ENCOUNTER — HOSPITAL ENCOUNTER (OUTPATIENT)
Dept: RADIOLOGY | Facility: MEDICAL CENTER | Age: 64
End: 2019-03-08
Attending: NURSE PRACTITIONER
Payer: COMMERCIAL

## 2019-03-08 DIAGNOSIS — Z12.31 VISIT FOR SCREENING MAMMOGRAM: ICD-10-CM

## 2019-03-08 PROCEDURE — 77063 BREAST TOMOSYNTHESIS BI: CPT

## 2019-03-21 ENCOUNTER — HOSPITAL ENCOUNTER (OUTPATIENT)
Dept: RADIOLOGY | Facility: MEDICAL CENTER | Age: 64
End: 2019-03-21
Attending: NURSE PRACTITIONER
Payer: COMMERCIAL

## 2019-03-21 DIAGNOSIS — R92.8 ABNORMAL MAMMOGRAM: ICD-10-CM

## 2019-03-21 PROCEDURE — G0279 TOMOSYNTHESIS, MAMMO: HCPCS | Mod: LT

## 2019-03-21 NOTE — TELEPHONE ENCOUNTER
Follow-up neurology for further evaluation    Follow-up psychotherapy as discussed    Return here for focal neurologic change, or any other concern.   Last seen: 08/25/16 by Dr. Colbert  Next appt: None     Was the patient seen in the last year in this department? Yes   Does patient have an active prescription for medications requested? No   Received Request Via: Pharmacy

## 2019-05-10 ENCOUNTER — OFFICE VISIT (OUTPATIENT)
Dept: MEDICAL GROUP | Facility: MEDICAL CENTER | Age: 64
End: 2019-05-10
Payer: COMMERCIAL

## 2019-05-10 VITALS
OXYGEN SATURATION: 96 % | WEIGHT: 190.6 LBS | SYSTOLIC BLOOD PRESSURE: 116 MMHG | RESPIRATION RATE: 16 BRPM | BODY MASS INDEX: 33.77 KG/M2 | HEIGHT: 63 IN | TEMPERATURE: 96.3 F | HEART RATE: 81 BPM | DIASTOLIC BLOOD PRESSURE: 72 MMHG

## 2019-05-10 DIAGNOSIS — L30.9 DERMATITIS: ICD-10-CM

## 2019-05-10 DIAGNOSIS — L29.8 CHRONIC PRURITIC RASH IN ADULT: ICD-10-CM

## 2019-05-10 DIAGNOSIS — Z91.89 OTHER SPECIFIED PERSONAL RISK FACTORS, NOT ELSEWHERE CLASSIFIED: ICD-10-CM

## 2019-05-10 DIAGNOSIS — R59.0 AXILLARY LYMPHADENOPATHY: ICD-10-CM

## 2019-05-10 DIAGNOSIS — I78.1 SPIDER VEINS: ICD-10-CM

## 2019-05-10 DIAGNOSIS — Z00.00 ANNUAL PHYSICAL EXAM: ICD-10-CM

## 2019-05-10 DIAGNOSIS — I87.2 VENOUS INSUFFICIENCY: ICD-10-CM

## 2019-05-10 DIAGNOSIS — E78.00 PURE HYPERCHOLESTEROLEMIA: ICD-10-CM

## 2019-05-10 PROBLEM — M19.90 OSTEOARTHROSIS: Status: ACTIVE | Noted: 2019-05-10

## 2019-05-10 PROBLEM — L29.89 CHRONIC PRURITIC RASH IN ADULT: Status: ACTIVE | Noted: 2019-05-10

## 2019-05-10 PROCEDURE — 99214 OFFICE O/P EST MOD 30 MIN: CPT | Performed by: NURSE PRACTITIONER

## 2019-05-10 ASSESSMENT — PATIENT HEALTH QUESTIONNAIRE - PHQ9: CLINICAL INTERPRETATION OF PHQ2 SCORE: 0

## 2019-05-10 NOTE — PROGRESS NOTES
Subjective:   Shaunna Vieira is a 63 y.o. female here today for follow up    Hyperlipidemia  The 10-year ASCVD risk score (Alma NENA Jr., et al., 2013) is: 4.6%    Lipids have worsened recently. She was on a statin in the past but developed an elevated CPK, however she states she had been heavily working out at that time. She would be willing to restart a statin if necessary, will get coronary calcium score first.     Venous insufficiency  Was seen by [facility] but did not have a great experience. Would like a second opinion with Maricruz Cruz.     Chronic pruritic rash in adult  Chronic issue with recurrent dermatitis and fungal rashes. Her dermatologist has retired, she is requesting a new referral today.        Current medicines (including changes today)  Current Outpatient Prescriptions   Medication Sig Dispense Refill   • ipratropium (ATROVENT) 0.06 % Solution Spray 2 Sprays in nose 4 times a day. 1 Bottle 0   • Wound Cleansers (LEVICYN DERMAL SPRAY EX) by Apply externally route.     • Fexofenadine HCl (ALLEGRA ALLERGY PO) Take  by mouth as needed (allergies).     • Dermatological Products, Misc. (HPR PLUS HYDROGEL) Kit by Apply externally route as needed.     • ketoconazole (NIZORAL) 2 % Cream Apply  to affected area(s) as needed.     • triamcinolone acetonide (KENALOG) 0.1 % Cream Apply  to affected area(s) as needed.     • fluticasone (FLONASE) 50 MCG/ACT nasal spray USE 2 SPRAYS IN EACH NOSTRIL DAILY AS NEEDED 16 g 3   • mupirocin (BACTROBAN) 2 % OINT Apply  to affected area(s) as needed.     • Glucosamine-Chondroit-Vit C-Mn (GLUCOSAMINE CHONDR 1500 COMPLX PO) Take  by mouth every day.     • Ascorbic Acid (VITAMIN C PO) Take  by mouth. Twice weekly     • chlorhexidine (HIBICLENS) 4 % liquid Apply  to affected area(s) 1 time daily as needed.     • Multiple Vitamin (MULTI-VITAMIN DAILY PO) Take  by mouth.       No current facility-administered medications for this visit.      She  has a past medical  "history of Abnormal uterine bleeding; Abscess; Acne; Anesthesia; Breast mass, right; Colon polyps; Dermatitis; Elevated random blood glucose level; Elevated transaminase level; Environmental and seasonal allergies; GERD (gastroesophageal reflux disease); Hyperlipidemia; Migraine; Obesity; Osteoarthritis; Pain (04/19/2018); Snoring; Tinnitus; and Vitamin D deficiency.    ROS     No chest pain, no shortness of breath, no abdominal pain  Positive ROS as per HPI.  All other systems reviewed and are negative.     Objective:     /72 (BP Location: Right arm, Patient Position: Sitting, BP Cuff Size: Adult)   Pulse 81   Temp (!) 35.7 °C (96.3 °F) (Temporal)   Resp 16   Ht 1.594 m (5' 2.76\")   Wt 86.5 kg (190 lb 9.6 oz)   SpO2 96%  Body mass index is 34.03 kg/m².     Physical Exam:  Constitutional: Alert, no distress.  Skin: Warm, dry, good turgor, no rashes in visible areas.  Eye: Equal, round, conjunctiva clear, lids normal.  ENMT: Lips without lesions, good dentition  Neck: Trachea midline  Respiratory: Unlabored respiratory effort  Cardiovascular: No edema.  Psych: Alert and oriented x3, normal affect and mood.      Assessment and Plan:   The following treatment plan was discussed    1. Spider veins  Follow up with [facility]  - REFERRAL TO VASCULAR SURGERY    2. Venous insufficiency  Follow up with [facility]  - REFERRAL TO VASCULAR SURGERY    3. Dermatitis  - REFERRAL TO DERMATOLOGY    4. Chronic pruritic rash in adult  - REFERRAL TO DERMATOLOGY    5. Other specified personal risk factors, not elsewhere classified  - CT-CARDIAC SCORING; Future    6. Pure hypercholesterolemia  Unstable  Diet and exercise  Check coronary calcium score  Consider statin again  - CT-CARDIAC SCORING; Future  - Lipid Profile; Future    7. Annual physical exam  - CBC WITH DIFFERENTIAL; Future  - TSH WITH REFLEX TO FT4; Future    8. Axillary lymphadenopathy  Unstable  Due to persistent nature, will check US with flare, if they " continue to enlarge, will get biopsy.   Possibly reactive due to recurrent dermatitis and fungal rash under breasts  - US-CHEST; Future      Followup: Return in about 4 weeks (around 6/7/2019) for Cholesterol, Review Labs.    I have placed the below orders and discussed them with an approved delegating provider. The MA is performing the below orders under the direction of Dr. Cast

## 2019-05-10 NOTE — ASSESSMENT & PLAN NOTE
Was seen by Anthony Alcantara but did not have a great experience. Would like a second opinion with Maricruz Cruz.

## 2019-05-10 NOTE — ASSESSMENT & PLAN NOTE
The 10-year ASCVD risk score ([place] BARRETT Jr., et al., 2013) is: 4.6%    Lipids have worsened recently. She was on a statin in the past but developed an elevated CPK, however she states she had been heavily working out at that time. She would be willing to restart a statin if necessary, will get coronary calcium score first.

## 2019-05-10 NOTE — ASSESSMENT & PLAN NOTE
Chronic issue with recurrent dermatitis and fungal rashes. Her dermatologist has retired, she is requesting a new referral today.

## 2019-05-16 ENCOUNTER — HOSPITAL ENCOUNTER (OUTPATIENT)
Dept: RADIOLOGY | Facility: MEDICAL CENTER | Age: 64
End: 2019-05-16
Attending: NURSE PRACTITIONER
Payer: COMMERCIAL

## 2019-05-16 DIAGNOSIS — Z91.89 OTHER SPECIFIED PERSONAL RISK FACTORS, NOT ELSEWHERE CLASSIFIED: ICD-10-CM

## 2019-05-16 DIAGNOSIS — E78.00 PURE HYPERCHOLESTEROLEMIA: ICD-10-CM

## 2019-05-16 PROCEDURE — 4410556 CT-CARDIAC SCORING

## 2019-06-09 LAB
BASOPHILS # BLD AUTO: 0.1 X10E3/UL (ref 0–0.2)
BASOPHILS NFR BLD AUTO: 1 %
CHOLEST SERPL-MCNC: 254 MG/DL (ref 100–199)
EOSINOPHIL # BLD AUTO: 0.3 X10E3/UL (ref 0–0.4)
EOSINOPHIL NFR BLD AUTO: 6 %
ERYTHROCYTE [DISTWIDTH] IN BLOOD BY AUTOMATED COUNT: 14 % (ref 12.3–15.4)
HCT VFR BLD AUTO: 42.4 % (ref 34–46.6)
HDLC SERPL-MCNC: 46 MG/DL
HGB BLD-MCNC: 14.2 G/DL (ref 11.1–15.9)
IMM GRANULOCYTES # BLD AUTO: 0 X10E3/UL (ref 0–0.1)
IMM GRANULOCYTES NFR BLD AUTO: 0 %
IMMATURE CELLS  115398: NORMAL
LABORATORY COMMENT REPORT: ABNORMAL
LDLC SERPL CALC-MCNC: 177 MG/DL (ref 0–99)
LYMPHOCYTES # BLD AUTO: 1.3 X10E3/UL (ref 0.7–3.1)
LYMPHOCYTES NFR BLD AUTO: 27 %
MCH RBC QN AUTO: 28.8 PG (ref 26.6–33)
MCHC RBC AUTO-ENTMCNC: 33.5 G/DL (ref 31.5–35.7)
MCV RBC AUTO: 86 FL (ref 79–97)
MONOCYTES # BLD AUTO: 0.5 X10E3/UL (ref 0.1–0.9)
MONOCYTES NFR BLD AUTO: 11 %
MORPHOLOGY BLD-IMP: NORMAL
NEUTROPHILS # BLD AUTO: 2.6 X10E3/UL (ref 1.4–7)
NEUTROPHILS NFR BLD AUTO: 55 %
NRBC BLD AUTO-RTO: NORMAL %
PLATELET # BLD AUTO: 255 X10E3/UL (ref 150–450)
RBC # BLD AUTO: 4.93 X10E6/UL (ref 3.77–5.28)
TRIGL SERPL-MCNC: 154 MG/DL (ref 0–149)
TSH SERPL DL<=0.005 MIU/L-ACNC: 3.25 UIU/ML (ref 0.45–4.5)
VLDLC SERPL CALC-MCNC: 31 MG/DL (ref 5–40)
WBC # BLD AUTO: 4.7 X10E3/UL (ref 3.4–10.8)

## 2019-06-13 ENCOUNTER — OFFICE VISIT (OUTPATIENT)
Dept: MEDICAL GROUP | Facility: MEDICAL CENTER | Age: 64
End: 2019-06-13
Payer: COMMERCIAL

## 2019-06-13 VITALS
OXYGEN SATURATION: 100 % | HEIGHT: 63 IN | RESPIRATION RATE: 16 BRPM | HEART RATE: 85 BPM | BODY MASS INDEX: 34.38 KG/M2 | WEIGHT: 194 LBS | TEMPERATURE: 97 F | SYSTOLIC BLOOD PRESSURE: 110 MMHG | DIASTOLIC BLOOD PRESSURE: 72 MMHG

## 2019-06-13 DIAGNOSIS — G89.29 CHRONIC PAIN OF LEFT KNEE: ICD-10-CM

## 2019-06-13 DIAGNOSIS — E78.00 PURE HYPERCHOLESTEROLEMIA: ICD-10-CM

## 2019-06-13 DIAGNOSIS — E66.9 OBESITY (BMI 30-39.9): ICD-10-CM

## 2019-06-13 DIAGNOSIS — R73.01 IMPAIRED FASTING GLUCOSE: ICD-10-CM

## 2019-06-13 DIAGNOSIS — E66.9 CLASS 1 OBESITY WITHOUT SERIOUS COMORBIDITY WITH BODY MASS INDEX (BMI) OF 33.0 TO 33.9 IN ADULT, UNSPECIFIED OBESITY TYPE: ICD-10-CM

## 2019-06-13 DIAGNOSIS — Z00.00 HEALTHCARE MAINTENANCE: ICD-10-CM

## 2019-06-13 DIAGNOSIS — M25.562 CHRONIC PAIN OF LEFT KNEE: ICD-10-CM

## 2019-06-13 PROCEDURE — 99214 OFFICE O/P EST MOD 30 MIN: CPT | Performed by: NURSE PRACTITIONER

## 2019-06-13 RX ORDER — ATORVASTATIN CALCIUM 10 MG/1
10 TABLET, FILM COATED ORAL EVERY EVENING
Qty: 30 TAB | Refills: 11 | Status: SHIPPED | OUTPATIENT
Start: 2019-06-13 | End: 2020-06-26

## 2019-06-13 NOTE — PROGRESS NOTES
Subjective:   Shaunna Vieira is a 63 y.o. female here today for follow up:    Hyperlipidemia  Lipids worse after lifestyle changes, has gained a few pounds. Feeling discouraged. Coronary calcium score > 50th % for age and sex. Had been on statin in the past, CPK had elevated but she reports that she had been heavily exercising during that time, she did not have any myalgias or abdominal pain, no LFT elevation.        Ref. Range 1/30/2019 08:56 6/8/2019 07:44   Cholesterol,Tot Latest Ref Range: 100 - 199 mg/dL 235 (H) 254 (H)   Triglycerides Latest Ref Range: 0 - 149 mg/dL 119 154 (H)   HDL Latest Ref Range: >39 mg/dL 44 46   LDL Latest Ref Range: 0 - 99 mg/dL 167 (H) 177 (H)   VLDL Cholesterol Calc Latest Ref Range: 5 - 40 mg/dL 24 31         Class 1 obesity without serious comorbidity with body mass index (BMI) of 33.0 to 33.9 in adult  Continues to have difficulty losing weight despite dietary changes and increased exercise.     Knee pain, left  Continues using topical CBD cream and exercising regularly. Working on weight loss.        Current medicines (including changes today)  Current Outpatient Prescriptions   Medication Sig Dispense Refill   • atorvastatin (LIPITOR) 10 MG Tab Take 1 Tab by mouth every evening. 30 Tab 11   • ipratropium (ATROVENT) 0.06 % Solution Spray 2 Sprays in nose 4 times a day. 1 Bottle 0   • Wound Cleansers (LEVICYN DERMAL SPRAY EX) by Apply externally route.     • Fexofenadine HCl (ALLEGRA ALLERGY PO) Take  by mouth as needed (allergies).     • Dermatological Products, Misc. (HPR PLUS HYDROGEL) Kit by Apply externally route as needed.     • ketoconazole (NIZORAL) 2 % Cream Apply  to affected area(s) as needed.     • triamcinolone acetonide (KENALOG) 0.1 % Cream Apply  to affected area(s) as needed.     • fluticasone (FLONASE) 50 MCG/ACT nasal spray USE 2 SPRAYS IN EACH NOSTRIL DAILY AS NEEDED 16 g 3   • mupirocin (BACTROBAN) 2 % OINT Apply  to affected area(s) as needed.     •  "Glucosamine-Chondroit-Vit C-Mn (GLUCOSAMINE CHONDR 1500 COMPLX PO) Take  by mouth every day.     • Ascorbic Acid (VITAMIN C PO) Take  by mouth. Twice weekly     • chlorhexidine (HIBICLENS) 4 % liquid Apply  to affected area(s) 1 time daily as needed.     • Multiple Vitamin (MULTI-VITAMIN DAILY PO) Take  by mouth.       No current facility-administered medications for this visit.      She  has a past medical history of Abnormal uterine bleeding; Abscess; Acne; Anesthesia; Breast mass, right; Colon polyps; Dermatitis; Elevated random blood glucose level; Elevated transaminase level; Environmental and seasonal allergies; GERD (gastroesophageal reflux disease); Hyperlipidemia; Migraine; Obesity; Osteoarthritis; Pain (04/19/2018); Snoring; Tinnitus; and Vitamin D deficiency.    ROS   No chest pain, no shortness of breath, no abdominal pain  Positive ROS as per HPI.  All other systems reviewed and are negative.     Objective:     /72 (BP Location: Right arm, Patient Position: Sitting, BP Cuff Size: Adult)   Pulse 85   Temp 36.1 °C (97 °F) (Temporal)   Resp 16   Ht 1.594 m (5' 2.76\")   Wt 88 kg (194 lb)   SpO2 100%  Body mass index is 34.63 kg/m².     Physical Exam:  Constitutional: Alert, no distress.  Skin: Warm, dry, good turgor, no rashes in visible areas.  Eye: Equal, round and reactive, conjunctiva clear, lids normal.  ENMT: Lips without lesions, good dentition,  Neck: Trachea midline  Respiratory: Unlabored respiratory effort  Cardiovascular: No edema.  Psych: Alert and oriented x3, normal affect and mood.      Assessment and Plan:   The following treatment plan was discussed    1. Pure hypercholesterolemia  Unstable  Follow up with HIP  Start atorvastatin at 5 mg (1/2 tab) for 1-2 weeks, if tolerated then increase to full tab daily  Check CPK and LFTs in 1 month  - REFERRAL TO [facility] (HIP) Services Requested: Weight Management Program, Physician Medical Weight Management " Program; Reason for Referral? BMI>25 and 1 or more co-morbidities, including DM2, HTN, steatosis/steatohepatiti...; Future  - atorvastatin (LIPITOR) 10 MG Tab; Take 1 Tab by mouth every evening.  Dispense: 30 Tab; Refill: 11  - CREATINE KINASE; Future  - Comp Metabolic Panel; Future  - Lipid Profile; Future    2. Obesity (BMI 30-39.9)  - Patient identified as having weight management issue.  Appropriate orders and counseling given.  - REFERRAL TO [facility] (HIP) Services Requested: Weight Management Program, Physician Medical Weight Management Program; Reason for Referral? BMI>25 and 1 or more co-morbidities, including DM2, HTN, steatosis/steatohepatiti...; Future    3. Impaired fasting glucose  - REFERRAL TO [facility] (HIP) Services Requested: Weight Management Program, Physician Medical Weight Management Program; Reason for Referral? BMI>25 and 1 or more co-morbidities, including DM2, HTN, steatosis/steatohepatiti...; Future  - HEMOGLOBIN A1C; Future    4.  Chronic pain of left knee  Stable  Continue follow up with specialist and CBD cream      Followup: Return in about 3 months (around 9/13/2019).    I have placed the below orders and discussed them with an approved delegating provider. The MA is performing the below orders under the direction of Dr. Cast

## 2019-06-13 NOTE — ASSESSMENT & PLAN NOTE
Lipids worse after lifestyle changes, has gained a few pounds. Feeling discouraged. Coronary calcium score > 50th % for age and sex. Had been on statin in the past, CPK had elevated but she reports that she had been heavily exercising during that time, she did not have any myalgias or abdominal pain, no LFT elevation.        Ref. Range 1/30/2019 08:56 6/8/2019 07:44   Cholesterol,Tot Latest Ref Range: 100 - 199 mg/dL 235 (H) 254 (H)   Triglycerides Latest Ref Range: 0 - 149 mg/dL 119 154 (H)   HDL Latest Ref Range: >39 mg/dL 44 46   LDL Latest Ref Range: 0 - 99 mg/dL 167 (H) 177 (H)   VLDL Cholesterol Calc Latest Ref Range: 5 - 40 mg/dL 24 31

## 2019-07-15 ENCOUNTER — PATIENT MESSAGE (OUTPATIENT)
Dept: MEDICAL GROUP | Facility: MEDICAL CENTER | Age: 64
End: 2019-07-15

## 2019-07-15 DIAGNOSIS — Z78.0 POSTMENOPAUSAL: ICD-10-CM

## 2019-07-16 NOTE — TELEPHONE ENCOUNTER
----- Message from Elizabeth Crum sent at 7/15/2019  9:24 AM PDT -----  Regarding: FW: Referral Request  Contact: [phone]      ----- Message -----  From: Lizzy Shukla  Sent: 7/15/2019   9:11 AM  To: Zahraa Hinds Fm Mas  Subject: FW: Referral Request                                 ----- Message -----  From: Shaunna Vieira  Sent: 7/15/2019   9:00 AM  To: Mahendra All Mas  Subject: Referral Request                                 Shaunna Vieira would like to request a referral.  Reason: Bone Density at [facility]  Requested provider: Vibha Leon  Comment:  Had baseline at 40, another at around 51, time for another. Mother shrunk several inches. Thanks.

## 2019-07-18 ENCOUNTER — PATIENT MESSAGE (OUTPATIENT)
Dept: MEDICAL GROUP | Facility: MEDICAL CENTER | Age: 64
End: 2019-07-18

## 2019-07-18 DIAGNOSIS — Z78.0 POSTMENOPAUSAL: ICD-10-CM

## 2019-08-02 ENCOUNTER — OFFICE VISIT (OUTPATIENT)
Dept: MEDICAL GROUP | Facility: MEDICAL CENTER | Age: 64
End: 2019-08-02
Payer: COMMERCIAL

## 2019-08-02 VITALS
OXYGEN SATURATION: 96 % | SYSTOLIC BLOOD PRESSURE: 110 MMHG | DIASTOLIC BLOOD PRESSURE: 72 MMHG | WEIGHT: 191 LBS | HEIGHT: 63 IN | BODY MASS INDEX: 33.84 KG/M2 | RESPIRATION RATE: 16 BRPM | HEART RATE: 79 BPM | TEMPERATURE: 98.4 F

## 2019-08-02 DIAGNOSIS — M24.849 THUMB JOINT LOCKING: ICD-10-CM

## 2019-08-02 DIAGNOSIS — H92.02 LEFT EAR PAIN: ICD-10-CM

## 2019-08-02 PROCEDURE — 99214 OFFICE O/P EST MOD 30 MIN: CPT | Performed by: NURSE PRACTITIONER

## 2019-08-02 NOTE — PROGRESS NOTES
Subjective:   Shaunna Vieira is a 63 y.o. female here today for the following concerns:    Left ear pain  Started in the past week. Having a pain/pressure around left ear. Sometimes along the back of her head. No teeth pain. Mild in nature. 1.5/10. But would like to have it checked out before going out of town.     Thumb joint locking  Ongoing for months.  Thumb joint is catching when she bends at DIP joint.  Does have increased pain when she has to use the thumb frequently during the day or when she needs to lift anything with that hand.  Joint is not completely locking up at this time.  No known trauma or injury.  Has taken some ibuprofen but tries to avoid these medications.  Did not notice a difference.       Current medicines (including changes today)  Current Outpatient Medications   Medication Sig Dispense Refill   • atorvastatin (LIPITOR) 10 MG Tab Take 1 Tab by mouth every evening. 30 Tab 11   • ipratropium (ATROVENT) 0.06 % Solution Spray 2 Sprays in nose 4 times a day. 1 Bottle 0   • Wound Cleansers (LEVICYN DERMAL SPRAY EX) by Apply externally route.     • Fexofenadine HCl (ALLEGRA ALLERGY PO) Take  by mouth as needed (allergies).     • Dermatological Products, Misc. (HPR PLUS HYDROGEL) Kit by Apply externally route as needed.     • ketoconazole (NIZORAL) 2 % Cream Apply  to affected area(s) as needed.     • triamcinolone acetonide (KENALOG) 0.1 % Cream Apply  to affected area(s) as needed.     • fluticasone (FLONASE) 50 MCG/ACT nasal spray USE 2 SPRAYS IN EACH NOSTRIL DAILY AS NEEDED 16 g 3   • mupirocin (BACTROBAN) 2 % OINT Apply  to affected area(s) as needed.     • Glucosamine-Chondroit-Vit C-Mn (GLUCOSAMINE CHONDR 1500 COMPLX PO) Take  by mouth every day.     • Ascorbic Acid (VITAMIN C PO) Take  by mouth. Twice weekly     • chlorhexidine (HIBICLENS) 4 % liquid Apply  to affected area(s) 1 time daily as needed.     • Multiple Vitamin (MULTI-VITAMIN DAILY PO) Take  by mouth.       No current  "facility-administered medications for this visit.      She  has a past medical history of Abnormal uterine bleeding, Abscess, Acne, Anesthesia, Breast mass, right, Colon polyps, Dermatitis, Elevated random blood glucose level, Elevated transaminase level, Environmental and seasonal allergies, GERD (gastroesophageal reflux disease), Hyperlipidemia, Migraine, Obesity, Osteoarthritis, Pain (04/19/2018), Snoring, Tinnitus, and Vitamin D deficiency.    ROS  No chest pain, no shortness of breath, no abdominal pain  Positive ROS as per HPI.  All other systems reviewed and are negative.     Objective:     /72   Pulse 79   Temp 36.9 °C (98.4 °F) (Temporal)   Resp 16   Ht 1.594 m (5' 2.76\")   Wt 86.6 kg (191 lb)   SpO2 96%  Body mass index is 34.09 kg/m².     Physical Exam:  Constitutional: Alert, no distress.  Skin: Warm, dry, good turgor, no rashes in visible areas.  Eye: Equal, round, conjunctiva clear, lids normal.  ENMT: Lips without lesions, good dentition  Respiratory: Unlabored respiratory effort  Cardiovascular: No edema.  Psych: Alert and oriented x3, normal affect and mood.  Thumb exam: Significant movement and catching of tendon of left thumb DIP joint.  No edema or erythema.  No crepitus.      Assessment and Plan:   The following treatment plan was discussed    1. Left ear pain  Unstable  Likely some eustachian tube dysfunction versus sinus congestion  Advised Flonase daily, correct usage discussed, nasal saline rinse, switch antihistamine.    2.  Thumb joint locking  Unstable  Possibly the start of trigger finger versus inflammation of tendon  Patient would like to hold off on medication and referral at this point, she will notify me when she would like to begin treatment or have referral placed.      Followup: Return in about 3 months (around 11/2/2019).    I have placed the below orders and discussed them with an approved delegating provider. The MA is performing the below orders under the " direction of Dr. Cast

## 2019-08-02 NOTE — ASSESSMENT & PLAN NOTE
Ongoing for months.  Thumb joint is catching when she bends at DIP joint.  Does have increased pain when she has to use the thumb frequently during the day or when she needs to lift anything with that hand.  Joint is not completely locking up at this time.  No known trauma or injury.  Has taken some ibuprofen but tries to avoid these medications.  Did not notice a difference.

## 2019-08-02 NOTE — ASSESSMENT & PLAN NOTE
Started in the past week. Having a pain/pressure around left ear. Sometimes along the back of her head. No teeth pain. Mild in nature. 1.5/10. But would like to have it checked out before going out of town.

## 2019-09-10 LAB
ALBUMIN SERPL-MCNC: 4.4 G/DL (ref 3.6–4.8)
ALBUMIN/GLOB SERPL: 1.9 {RATIO} (ref 1.2–2.2)
ALP SERPL-CCNC: 69 IU/L (ref 39–117)
ALT SERPL-CCNC: 21 IU/L (ref 0–32)
AST SERPL-CCNC: 19 IU/L (ref 0–40)
BILIRUB SERPL-MCNC: 0.8 MG/DL (ref 0–1.2)
BUN SERPL-MCNC: 16 MG/DL (ref 8–27)
BUN/CREAT SERPL: 18 (ref 12–28)
CALCIUM SERPL-MCNC: 9.5 MG/DL (ref 8.7–10.3)
CHLORIDE SERPL-SCNC: 103 MMOL/L (ref 96–106)
CHOLEST SERPL-MCNC: 159 MG/DL (ref 100–199)
CK SERPL-CCNC: 93 U/L (ref 24–173)
CO2 SERPL-SCNC: 25 MMOL/L (ref 20–29)
CREAT SERPL-MCNC: 0.91 MG/DL (ref 0.57–1)
GLOBULIN SER CALC-MCNC: 2.3 G/DL (ref 1.5–4.5)
GLUCOSE SERPL-MCNC: 101 MG/DL (ref 65–99)
HBA1C MFR BLD: 5.5 % (ref 4.8–5.6)
HDLC SERPL-MCNC: 46 MG/DL
LABORATORY COMMENT REPORT: NORMAL
LDLC SERPL CALC-MCNC: 87 MG/DL (ref 0–99)
POTASSIUM SERPL-SCNC: 4.8 MMOL/L (ref 3.5–5.2)
PROT SERPL-MCNC: 6.7 G/DL (ref 6–8.5)
SODIUM SERPL-SCNC: 142 MMOL/L (ref 134–144)
TRIGL SERPL-MCNC: 129 MG/DL (ref 0–149)
VLDLC SERPL CALC-MCNC: 26 MG/DL (ref 5–40)

## 2019-09-12 ENCOUNTER — TELEPHONE (OUTPATIENT)
Dept: MEDICAL GROUP | Facility: MEDICAL CENTER | Age: 64
End: 2019-09-12

## 2019-09-12 DIAGNOSIS — M25.549 ARTHRALGIA OF HAND, UNSPECIFIED LATERALITY: ICD-10-CM

## 2019-09-12 DIAGNOSIS — M24.849 THUMB JOINT LOCKING: ICD-10-CM

## 2020-02-28 ENCOUNTER — HOSPITAL ENCOUNTER (OUTPATIENT)
Dept: RADIOLOGY | Facility: MEDICAL CENTER | Age: 65
End: 2020-02-28
Attending: NURSE PRACTITIONER
Payer: COMMERCIAL

## 2020-02-28 DIAGNOSIS — R92.8 ABNORMAL MAMMOGRAM: ICD-10-CM

## 2020-02-28 DIAGNOSIS — R59.0 AXILLARY LYMPHADENOPATHY: ICD-10-CM

## 2020-02-28 PROCEDURE — G0279 TOMOSYNTHESIS, MAMMO: HCPCS | Mod: 50

## 2020-02-28 PROCEDURE — 76642 ULTRASOUND BREAST LIMITED: CPT | Mod: LT

## 2020-06-04 ENCOUNTER — PATIENT MESSAGE (OUTPATIENT)
Dept: MEDICAL GROUP | Facility: MEDICAL CENTER | Age: 65
End: 2020-06-04

## 2020-06-26 DIAGNOSIS — E78.00 PURE HYPERCHOLESTEROLEMIA: ICD-10-CM

## 2020-06-26 RX ORDER — ATORVASTATIN CALCIUM 10 MG/1
TABLET, FILM COATED ORAL
Qty: 30 TAB | Refills: 0 | Status: SHIPPED | OUTPATIENT
Start: 2020-06-26 | End: 2020-08-13 | Stop reason: SDUPTHER

## 2020-08-14 ENCOUNTER — PATIENT MESSAGE (OUTPATIENT)
Dept: MEDICAL GROUP | Facility: MEDICAL CENTER | Age: 65
End: 2020-08-14

## 2020-08-16 NOTE — TELEPHONE ENCOUNTER
From: Shaunna Vieira  To: DUY Shen  Sent: 8/14/2020 4:20 PM PDT  Subject: Prescription Question    Gianluca, I am Vibha Good's patient but I have seen you and Renuka Marley before. I need my statin prescription renewed. I've sent two emails and a prescription renewal request in and just sent in a voicemail to Vibha SUTTON's assistant. I am having no unusual symptoms, think the statin is working fine and would like to get this renewed, hopefully until maybe early next year when I can safely get some blood work done? Please coordinate with your team and have someone call me or text me and tell me what is going on. Thanks. Shaunna Vieira [phone]

## 2020-08-18 ENCOUNTER — TELEPHONE (OUTPATIENT)
Dept: MEDICAL GROUP | Facility: MEDICAL CENTER | Age: 65
End: 2020-08-18

## 2020-08-18 DIAGNOSIS — E78.00 PURE HYPERCHOLESTEROLEMIA: ICD-10-CM

## 2020-08-18 RX ORDER — ATORVASTATIN CALCIUM 10 MG/1
10 TABLET, FILM COATED ORAL
Qty: 90 TAB | Refills: 1 | Status: SHIPPED | OUTPATIENT
Start: 2020-08-18 | End: 2021-03-15

## 2020-08-19 NOTE — TELEPHONE ENCOUNTER
----- Message from Breanna Luque Med Ass't sent at 8/12/2020  6:59 AM PDT -----  Regarding: FW: Prescription Question  Contact: [phone]    ----- Message -----  From: Shaunna Vieira  Sent: 8/11/2020   5:16 PM PDT  To: Zahraa Cain  Subject: Prescription Question                            Jonas Dunne, I hope you are well. I need to renew my prescription for the statin drug. Someone in your office gave me a one month subscription to extend until you return.   I'm teaching all online, doing well. Both because of my age and all my various healthcare ailments and because I live with my older brother who is high risk and also on the Autism spectrum we've both been staying home since March, with few trips for pickups. I ride my bike w/ mask every 2 to 3 days for exercise, as well as gardening. I'm managing chronic leg pain ok. Would rather not go into a lab for blood work for a while yet. Could you please renew my statin for another 6 months and hopefully by then we'll have a C-19 vaccine? Thanks for your response. Be safe and well.

## 2020-08-31 ENCOUNTER — TELEMEDICINE (OUTPATIENT)
Dept: MEDICAL GROUP | Facility: MEDICAL CENTER | Age: 65
End: 2020-08-31
Payer: COMMERCIAL

## 2020-08-31 VITALS — HEIGHT: 63 IN | BODY MASS INDEX: 33.66 KG/M2 | WEIGHT: 190 LBS

## 2020-08-31 DIAGNOSIS — E78.00 PURE HYPERCHOLESTEROLEMIA: ICD-10-CM

## 2020-08-31 DIAGNOSIS — R73.03 PREDIABETES: ICD-10-CM

## 2020-08-31 DIAGNOSIS — M15.9 PRIMARY OSTEOARTHRITIS INVOLVING MULTIPLE JOINTS: ICD-10-CM

## 2020-08-31 DIAGNOSIS — F41.9 ANXIETY: ICD-10-CM

## 2020-08-31 DIAGNOSIS — E55.9 VITAMIN D DEFICIENCY: ICD-10-CM

## 2020-08-31 DIAGNOSIS — Z00.00 ANNUAL PHYSICAL EXAM: ICD-10-CM

## 2020-08-31 PROCEDURE — 99214 OFFICE O/P EST MOD 30 MIN: CPT | Mod: 95,CR | Performed by: NURSE PRACTITIONER

## 2020-08-31 ASSESSMENT — FIBROSIS 4 INDEX: FIB4 SCORE: 1.04

## 2020-08-31 NOTE — PROGRESS NOTES
Telemedicine Visit: Established Patient     This encounter was conducted via Zoom .   Verbal consent was obtained. Patient's identity was verified.    Subjective:   CC: Follow up  Shaunna Vieira is a 64 y.o. female presenting for evaluation and management of:    Osteoarthrosis  Taking herbal therapies and using topical CBD oil for this and NSAIDs as needed which helps. Waking her up at night.     Has been staying active with riding her bike, stretching.     Anxiety  Starting to have some waves of anxiety and mood changes. Is talking with a psychologist which helps. Feels that it is related to current events and isolation.     Hyperlipidemia  Chronic. Stable on atorvastatin daily. Denies myalgias or abdominal pain.        ROS   Denies any recent fevers or chills. No nausea or vomiting. No chest pains or shortness of breath.     Allergies   Allergen Reactions   • Morphine Shortness of Breath     Also get hallucinations   • Nystatin Rash     .   • Prilosec [Omeprazole]    • Sulfa Drugs Unspecified     Pt doesn't know what happens, had as a child   • Cobalt    • Lanolin    • Pravastatin        Current medicines (including changes today)  Current Outpatient Medications   Medication Sig Dispense Refill   • atorvastatin (LIPITOR) 10 MG Tab Take 1 Tab by mouth every day. N THE EVENING 90 Tab 1   • ipratropium (ATROVENT) 0.06 % Solution Spray 2 Sprays in nose 4 times a day. 1 Bottle 0   • Wound Cleansers (LEVICYN DERMAL SPRAY EX) by Apply externally route.     • Fexofenadine HCl (ALLEGRA ALLERGY PO) Take  by mouth as needed (allergies).     • Dermatological Products, Misc. (HPR PLUS HYDROGEL) Kit by Apply externally route as needed.     • ketoconazole (NIZORAL) 2 % Cream Apply  to affected area(s) as needed.     • triamcinolone acetonide (KENALOG) 0.1 % Cream Apply  to affected area(s) as needed.     • fluticasone (FLONASE) 50 MCG/ACT nasal spray USE 2 SPRAYS IN EACH NOSTRIL DAILY AS NEEDED 16 g 3   • mupirocin  (BACTROBAN) 2 % OINT Apply  to affected area(s) as needed.     • Glucosamine-Chondroit-Vit C-Mn (GLUCOSAMINE CHONDR 1500 COMPLX PO) Take  by mouth every day.     • Ascorbic Acid (VITAMIN C PO) Take  by mouth. Twice weekly     • chlorhexidine (HIBICLENS) 4 % liquid Apply  to affected area(s) 1 time daily as needed.     • Multiple Vitamin (MULTI-VITAMIN DAILY PO) Take  by mouth.       No current facility-administered medications for this visit.        Patient Active Problem List    Diagnosis Date Noted   • Anxiety 08/31/2020   • Left ear pain 08/02/2019   • Thumb joint locking 08/02/2019   • Postmenopausal 07/15/2019   • Obesity (BMI 30-39.9) 06/13/2019   • Osteoarthrosis 05/10/2019   • Venous insufficiency 05/10/2019   • Chronic pruritic rash in adult 05/10/2019   • Viral upper respiratory tract infection 02/20/2019   • Abnormal renal function 10/01/2018   • Left leg pain 07/02/2018   • Spider veins 07/02/2018   • Prediabetes 07/02/2018   • Leg cramps 04/24/2018   • Health care maintenance 03/07/2018   • Abnormal uterine bleeding 03/07/2018   • Cervix abnormality 03/07/2018   • Colon polyps 08/25/2016   • Impaired fasting glucose 08/25/2016   • Vitamin D deficiency 08/25/2016   • Hyperlipidemia 08/25/2016   • Gastroesophageal reflux disease 08/25/2016   • Tinnitus of both ears 08/25/2016   • Class 1 obesity without serious comorbidity with body mass index (BMI) of 33.0 to 33.9 in adult 08/25/2016   • Predisposition to allergic reaction 08/25/2016   • Stress 08/25/2016   • Dermatitis 08/25/2016   • Knee pain, left 08/25/2016       Family History   Problem Relation Age of Onset   • Other Father         PARKINSON'S   • Cancer Father         lymphoma   • Breast Cancer Mother    • Other Mother         BLINDNESS/MAC DEG/SOKER   • Arthritis Mother    • Cancer Mother    • Stroke Mother         TIA   • Arthritis Brother    • Other Other         ASBERGERS   • Cancer Maternal Grandmother         asbestos       She  has a past  "medical history of Abnormal uterine bleeding, Abscess, Acne, Anesthesia, Breast mass, right, Colon polyps, Dermatitis, Elevated random blood glucose level, Elevated transaminase level, Environmental and seasonal allergies, GERD (gastroesophageal reflux disease), Hyperlipidemia, Migraine, Obesity, Osteoarthritis, Pain (04/19/2018), Snoring, Tinnitus, and Vitamin D deficiency.  She  has a past surgical history that includes other (2015); knee arthroscopy (Left, 7/17/2015); meniscectomy (Left, 7/17/2015); synovectomy (Left, 7/17/2015); breast biopsy; hysteroscopy with myosure (5/4/2018); and dilation and curettage (5/4/2018).       Objective:   Ht 1.594 m (5' 2.74\")   Wt 86.2 kg (190 lb)   LMP  (LMP Unknown)   BMI 33.94 kg/m²     Physical Exam:  Constitutional: Alert, no distress, well-groomed.  Skin: No rashes in visible areas.  Eye: Round. Conjunctiva clear, lids normal. No icterus.   ENMT: Lips pink without lesions, good dentition, moist mucous membranes. Phonation normal.  Neck: No masses, no thyromegaly. Moves freely without pain.  CV: Pulse as reported by patient  Respiratory: Unlabored respiratory effort, no cough or audible wheeze  Psych: Alert and oriented x3, normal affect and mood.       Assessment and Plan:   The following treatment plan was discussed:     1. Primary osteoarthritis involving multiple joints   Unstable  Advised NSAIDs as needed  Advised regular exericise and stretching regimen.     2. Anxiety  Unstable  Check labs  Continue follow up with psychology, she will notify me if she wants a referral to another therapist  - TSH WITH REFLEX TO FT4; Future    3. Pure hypercholesterolemia  Stable  Continue atorvastatin daily  - Lipid Profile; Future    4. Annual physical exam   Check labs when she feels comfortable going out in public  - CBC WITH DIFFERENTIAL; Future  - Comp Metabolic Panel; Future  - Lipid Profile; Future  - TSH WITH REFLEX TO FT4; Future  - VITAMIN D,25 HYDROXY; Future    5. " Vitamin D deficiency  - VITAMIN D,25 HYDROXY; Future    6. Prediabetes  - HEMOGLOBIN A1C; Future        Follow-up: Return in about 6 months (around 2/28/2021).

## 2020-08-31 NOTE — ASSESSMENT & PLAN NOTE
Taking herbal therapies and using topical CBD oil for this and NSAIDs as needed which helps. Waking her up at night.     Has been staying active with riding her bike, stretching.

## 2020-08-31 NOTE — ASSESSMENT & PLAN NOTE
Starting to have some waves of anxiety and mood changes. Is talking with a psychologist which helps. Feels that it is related to current events and isolation.

## 2021-03-03 DIAGNOSIS — Z23 NEED FOR VACCINATION: ICD-10-CM

## 2021-05-05 ENCOUNTER — HOSPITAL ENCOUNTER (OUTPATIENT)
Dept: RADIOLOGY | Facility: MEDICAL CENTER | Age: 66
End: 2021-05-05
Attending: NURSE PRACTITIONER
Payer: COMMERCIAL

## 2021-05-05 DIAGNOSIS — Z12.31 VISIT FOR SCREENING MAMMOGRAM: ICD-10-CM

## 2021-05-05 PROCEDURE — 77063 BREAST TOMOSYNTHESIS BI: CPT

## 2021-06-09 ENCOUNTER — PATIENT MESSAGE (OUTPATIENT)
Dept: MEDICAL GROUP | Facility: MEDICAL CENTER | Age: 66
End: 2021-06-09

## 2021-06-09 DIAGNOSIS — G89.29 CHRONIC PAIN OF LEFT KNEE: ICD-10-CM

## 2021-06-09 DIAGNOSIS — M25.562 CHRONIC PAIN OF LEFT KNEE: ICD-10-CM

## 2021-06-11 ENCOUNTER — TELEPHONE (OUTPATIENT)
Dept: MEDICAL GROUP | Facility: MEDICAL CENTER | Age: 66
End: 2021-06-11

## 2021-06-11 NOTE — TELEPHONE ENCOUNTER
Shaunna Vieira  [phone]    Pt called this am, states that she has left multiple message and gotten no response. Needs a REF to Dr Castillo @ [facility] so that she can get another Cortisone shot. Please advise.

## 2021-10-11 ENCOUNTER — PATIENT MESSAGE (OUTPATIENT)
Dept: MEDICAL GROUP | Facility: MEDICAL CENTER | Age: 66
End: 2021-10-11

## 2021-10-14 ENCOUNTER — PATIENT MESSAGE (OUTPATIENT)
Dept: MEDICAL GROUP | Facility: MEDICAL CENTER | Age: 66
End: 2021-10-14

## 2021-10-14 DIAGNOSIS — E55.9 VITAMIN D DEFICIENCY: ICD-10-CM

## 2021-10-14 DIAGNOSIS — R73.03 PREDIABETES: ICD-10-CM

## 2021-10-14 DIAGNOSIS — Z00.00 ANNUAL PHYSICAL EXAM: ICD-10-CM

## 2021-10-14 DIAGNOSIS — E78.00 PURE HYPERCHOLESTEROLEMIA: ICD-10-CM

## 2021-11-17 ENCOUNTER — PATIENT MESSAGE (OUTPATIENT)
Dept: MEDICAL GROUP | Facility: MEDICAL CENTER | Age: 66
End: 2021-11-17

## 2021-11-17 DIAGNOSIS — Z00.00 ANNUAL PHYSICAL EXAM: ICD-10-CM

## 2021-12-09 LAB
25(OH)D3+25(OH)D2 SERPL-MCNC: 45.1 NG/ML (ref 30–100)
ALBUMIN SERPL-MCNC: 4.9 G/DL (ref 3.8–4.8)
ALBUMIN/GLOB SERPL: 2.1 {RATIO} (ref 1.2–2.2)
ALP SERPL-CCNC: 79 IU/L (ref 44–121)
ALT SERPL-CCNC: 20 IU/L (ref 0–32)
APPEARANCE UR: CLEAR
AST SERPL-CCNC: 17 IU/L (ref 0–40)
BACTERIA #/AREA URNS HPF: NORMAL /[HPF]
BASOPHILS # BLD AUTO: 0 X10E3/UL (ref 0–0.2)
BASOPHILS NFR BLD AUTO: 1 %
BILIRUB SERPL-MCNC: 0.8 MG/DL (ref 0–1.2)
BILIRUB UR QL STRIP: NEGATIVE
BUN SERPL-MCNC: 12 MG/DL (ref 8–27)
BUN/CREAT SERPL: 14 (ref 12–28)
CALCIUM SERPL-MCNC: 9.9 MG/DL (ref 8.7–10.3)
CASTS URNS MICRO: NORMAL
CASTS URNS QL MICRO: NORMAL /LPF
CHLORIDE SERPL-SCNC: 103 MMOL/L (ref 96–106)
CHOLEST SERPL-MCNC: 173 MG/DL (ref 100–199)
CO2 SERPL-SCNC: 25 MMOL/L (ref 20–29)
COLOR UR: YELLOW
CREAT SERPL-MCNC: 0.87 MG/DL (ref 0.57–1)
CRYSTALS URNS MICRO: NORMAL
EOSINOPHIL # BLD AUTO: 0.1 X10E3/UL (ref 0–0.4)
EOSINOPHIL NFR BLD AUTO: 1 %
EPI CELLS #/AREA URNS HPF: NORMAL /HPF (ref 0–10)
ERYTHROCYTE [DISTWIDTH] IN BLOOD BY AUTOMATED COUNT: 12.7 % (ref 11.7–15.4)
GLOBULIN SER CALC-MCNC: 2.3 G/DL (ref 1.5–4.5)
GLUCOSE SERPL-MCNC: 109 MG/DL (ref 65–99)
GLUCOSE UR QL: NEGATIVE
HBA1C MFR BLD: 5.8 % (ref 4.8–5.6)
HCT VFR BLD AUTO: 42.2 % (ref 34–46.6)
HDLC SERPL-MCNC: 49 MG/DL
HGB BLD-MCNC: 14.3 G/DL (ref 11.1–15.9)
HGB UR QL STRIP: NEGATIVE
IMM GRANULOCYTES # BLD AUTO: 0 X10E3/UL (ref 0–0.1)
IMM GRANULOCYTES NFR BLD AUTO: 0 %
IMMATURE CELLS  115398: NORMAL
KETONES UR QL STRIP: NEGATIVE
LABORATORY COMMENT REPORT: ABNORMAL
LDLC SERPL CALC-MCNC: 107 MG/DL (ref 0–99)
LEUKOCYTE ESTERASE UR QL STRIP: NEGATIVE
LYMPHOCYTES # BLD AUTO: 1.2 X10E3/UL (ref 0.7–3.1)
LYMPHOCYTES NFR BLD AUTO: 22 %
MCH RBC QN AUTO: 29.3 PG (ref 26.6–33)
MCHC RBC AUTO-ENTMCNC: 33.9 G/DL (ref 31.5–35.7)
MCV RBC AUTO: 87 FL (ref 79–97)
MICRO URNS: NORMAL
MICRO URNS: NORMAL
MONOCYTES # BLD AUTO: 0.4 X10E3/UL (ref 0.1–0.9)
MONOCYTES NFR BLD AUTO: 8 %
MORPHOLOGY BLD-IMP: NORMAL
MUCOUS THREADS URNS QL MICRO: NORMAL
NEUTROPHILS # BLD AUTO: 3.6 X10E3/UL (ref 1.4–7)
NEUTROPHILS NFR BLD AUTO: 68 %
NITRITE UR QL STRIP: NEGATIVE
NRBC BLD AUTO-RTO: NORMAL %
PH UR STRIP: 5.5 [PH] (ref 5–7.5)
PLATELET # BLD AUTO: 272 X10E3/UL (ref 150–450)
POTASSIUM SERPL-SCNC: 5 MMOL/L (ref 3.5–5.2)
PROT SERPL-MCNC: 7.2 G/DL (ref 6–8.5)
PROT UR QL STRIP: NEGATIVE
RBC # BLD AUTO: 4.88 X10E6/UL (ref 3.77–5.28)
RBC #/AREA URNS HPF: NORMAL /HPF (ref 0–2)
RENAL EPI CELLS #/AREA URNS HPF: NORMAL /[HPF]
SODIUM SERPL-SCNC: 142 MMOL/L (ref 134–144)
SP GR UR: 1.01 (ref 1–1.03)
T VAGINALIS URNS QL MICRO: NORMAL
TRIGL SERPL-MCNC: 92 MG/DL (ref 0–149)
TSH SERPL DL<=0.005 MIU/L-ACNC: 2.99 UIU/ML (ref 0.45–4.5)
UNIDENT CRYS URNS QL MICRO: NORMAL
URINALYSIS REFLEX  377202: NORMAL
URNS CMNT MICRO: NORMAL
UROBILINOGEN UR STRIP-MCNC: 0.2 MG/DL (ref 0.2–1)
VLDLC SERPL CALC-MCNC: 17 MG/DL (ref 5–40)
WBC # BLD AUTO: 5.4 X10E3/UL (ref 3.4–10.8)
WBC #/AREA URNS HPF: NORMAL /HPF (ref 0–5)
YEAST #/AREA URNS HPF: NORMAL /[HPF]

## 2021-12-17 ENCOUNTER — TELEPHONE (OUTPATIENT)
Dept: MEDICAL GROUP | Facility: MEDICAL CENTER | Age: 66
End: 2021-12-17

## 2021-12-17 NOTE — TELEPHONE ENCOUNTER
----- Message from DUY Herrera sent at 12/16/2021  3:30 PM PST -----  Please schedule patient for follow up appointment to discuss lab results. This is not urgent.   DUY Herrera

## 2021-12-22 ENCOUNTER — OFFICE VISIT (OUTPATIENT)
Dept: MEDICAL GROUP | Facility: MEDICAL CENTER | Age: 66
End: 2021-12-22
Payer: COMMERCIAL

## 2021-12-22 VITALS
BODY MASS INDEX: 34.78 KG/M2 | HEART RATE: 70 BPM | OXYGEN SATURATION: 92 % | DIASTOLIC BLOOD PRESSURE: 76 MMHG | TEMPERATURE: 97.3 F | SYSTOLIC BLOOD PRESSURE: 122 MMHG | HEIGHT: 62 IN | WEIGHT: 189 LBS

## 2021-12-22 DIAGNOSIS — E66.9 OBESITY (BMI 30-39.9): ICD-10-CM

## 2021-12-22 DIAGNOSIS — L73.2 HIDRADENITIS SUPPURATIVA: ICD-10-CM

## 2021-12-22 DIAGNOSIS — Z78.0 POSTMENOPAUSAL: ICD-10-CM

## 2021-12-22 DIAGNOSIS — R73.03 PREDIABETES: ICD-10-CM

## 2021-12-22 DIAGNOSIS — M79.605 LEFT LEG PAIN: ICD-10-CM

## 2021-12-22 DIAGNOSIS — Z23 NEED FOR VACCINATION: ICD-10-CM

## 2021-12-22 PROCEDURE — 99214 OFFICE O/P EST MOD 30 MIN: CPT | Mod: 25 | Performed by: NURSE PRACTITIONER

## 2021-12-22 PROCEDURE — 90732 PPSV23 VACC 2 YRS+ SUBQ/IM: CPT | Performed by: NURSE PRACTITIONER

## 2021-12-22 PROCEDURE — 90471 IMMUNIZATION ADMIN: CPT | Performed by: NURSE PRACTITIONER

## 2021-12-22 RX ORDER — CLINDAMYCIN PHOSPHATE 11.9 MG/ML
1 SOLUTION TOPICAL 2 TIMES DAILY
Qty: 60 ML | Refills: 3 | Status: SHIPPED | OUTPATIENT
Start: 2021-12-22 | End: 2022-09-02 | Stop reason: SDUPTHER

## 2021-12-22 ASSESSMENT — FIBROSIS 4 INDEX: FIB4 SCORE: .92237804071866325

## 2021-12-22 ASSESSMENT — PATIENT HEALTH QUESTIONNAIRE - PHQ9: CLINICAL INTERPRETATION OF PHQ2 SCORE: 0

## 2021-12-22 NOTE — ASSESSMENT & PLAN NOTE
Chronic. Taking herbal therapies and using topical CBD oil for this and NSAIDs and tylenol as needed which helps. Waking her up at night.     Has been staying active with riding her bike, stretching.     Tried PT in the past. Declined orthopedic appointment.

## 2021-12-22 NOTE — PROGRESS NOTES
Subjective:   Shaunna Vieira is a 66 y.o. female here today for lab review, follow up    Prediabetes  Chronic. A1C 5.8 on recent labs. Working on diet, exercise, weight loss.     Left leg pain  Chronic. Taking herbal therapies and using topical CBD oil for this and NSAIDs and tylenol as needed which helps. Waking her up at night.     Has been staying active with riding her bike, stretching.     Tried PT in the past. Declined orthopedic appointment.     Hidradenitis suppurativa  Pustules beneath breasts and bilateral axilla. Has been using Hibiclens, topical triamcinolone, and ketoconazole creams which helps slightly. No groin lesions.        Current medicines (including changes today)  Current Outpatient Medications   Medication Sig Dispense Refill   • clindamycin (CLEOCIN) 1 % Solution Apply 1 Application topically 2 times a day. Apply 1-2 times daily to affected areas 60 mL 3   • atorvastatin (LIPITOR) 10 MG Tab TAKE 1 TABLET BY MOUTH EVERY DAY IN THE EVENING 30 Tablet 2   • Glucosamine-Chondroit-Vit C-Mn (GLUCOSAMINE CHONDR 1500 COMPLX PO) Take  by mouth every day.     • Multiple Vitamin (MULTI-VITAMIN DAILY PO) Take  by mouth.     • ipratropium (ATROVENT) 0.06 % Solution Spray 2 Sprays in nose 4 times a day. 1 Bottle 0   • Wound Cleansers (LEVICYN DERMAL SPRAY EX) by Apply externally route.     • Fexofenadine HCl (ALLEGRA ALLERGY PO) Take  by mouth as needed (allergies).     • Dermatological Products, Misc. (HPR PLUS HYDROGEL) Kit by Apply externally route as needed.     • ketoconazole (NIZORAL) 2 % Cream Apply  to affected area(s) as needed.     • triamcinolone acetonide (KENALOG) 0.1 % Cream Apply  to affected area(s) as needed.     • fluticasone (FLONASE) 50 MCG/ACT nasal spray USE 2 SPRAYS IN EACH NOSTRIL DAILY AS NEEDED 16 g 3   • mupirocin (BACTROBAN) 2 % OINT Apply  to affected area(s) as needed.     • chlorhexidine (HIBICLENS) 4 % liquid Apply  to affected area(s) 1 time daily as needed.    "    No current facility-administered medications for this visit.     She  has a past medical history of Abnormal uterine bleeding, Abscess, Acne, Anesthesia, Breast mass, right, Colon polyps, Dermatitis, Elevated random blood glucose level, Elevated transaminase level, Environmental and seasonal allergies, GERD (gastroesophageal reflux disease), Hyperlipidemia, Migraine, Obesity, Osteoarthritis, Pain (04/19/2018), Snoring, Tinnitus, and Vitamin D deficiency.    ROS   No chest pain, no shortness of breath, no abdominal pain  Positive ROS as per HPI.  All other systems reviewed and are negative.     Objective:     /76 (BP Location: Right arm, Patient Position: Sitting, BP Cuff Size: Adult)   Pulse 70   Temp 36.3 °C (97.3 °F) (Temporal)   Ht 1.575 m (5' 2\")   Wt 85.7 kg (189 lb)   SpO2 92%  Body mass index is 34.57 kg/m².     Physical Exam:  Constitutional: Alert, no distress.  Skin: Warm, dry, good turgor,open pustular lesion under bilateral breasts and axilla, healed/scarred areas as well  Eye: Equal, round and reactive, conjunctiva clear, lids normal.  ENMT: Mask in place  Respiratory: Unlabored respiratory effort  Psych: Alert and oriented x3, normal affect and mood.        Assessment and Plan:   The following treatment plan was discussed    1. Prediabetes  Stable  Diet, exercise, and weight loss    2. Left leg pain  Stable  Continue current treatment plan    3. Hidradenitis suppurativa  Unstable  Clinda 1-2 times daily  - clindamycin (CLEOCIN) 1 % Solution; Apply 1 Application topically 2 times a day. Apply 1-2 times daily to affected areas  Dispense: 60 mL; Refill: 3    4. Postmenopausal  - DS-BONE DENSITY STUDY (DEXA); Future    5. Need for vaccination  - Pneumococal Polysaccharide Vaccine 23-Valent =>3YO SQ/IM      Followup: Return in about 3 months (around 3/22/2022).    I have placed the below orders and discussed them with an approved delegating provider. The MA is performing the below orders " under the direction of Dr. Lizama

## 2021-12-22 NOTE — ASSESSMENT & PLAN NOTE
Pustules beneath breasts and bilateral axilla. Has been using Hibiclens, topical triamcinolone, and ketoconazole creams which helps slightly. No groin lesions.

## 2021-12-24 ENCOUNTER — PATIENT MESSAGE (OUTPATIENT)
Dept: MEDICAL GROUP | Facility: MEDICAL CENTER | Age: 66
End: 2021-12-24

## 2021-12-24 DIAGNOSIS — N95.2 POST-MENOPAUSAL ATROPHIC VAGINITIS: ICD-10-CM

## 2022-01-03 NOTE — TELEPHONE ENCOUNTER
[employer]  1. Caller Name: Shaunna Vieira  Call Back Number: [phone]    2. Message: Pt left message requesting response to her medication questions she sent through [facility]. Please advise.    3. Patient approves office to leave a detailed [facility] message: yes

## 2022-01-04 ENCOUNTER — HOSPITAL ENCOUNTER (OUTPATIENT)
Dept: RADIOLOGY | Facility: MEDICAL CENTER | Age: 67
End: 2022-01-04
Attending: NURSE PRACTITIONER
Payer: COMMERCIAL

## 2022-01-04 DIAGNOSIS — E78.00 PURE HYPERCHOLESTEROLEMIA: ICD-10-CM

## 2022-01-04 DIAGNOSIS — Z78.0 POSTMENOPAUSAL: ICD-10-CM

## 2022-01-04 PROCEDURE — 77080 DXA BONE DENSITY AXIAL: CPT

## 2022-01-06 DIAGNOSIS — E78.00 PURE HYPERCHOLESTEROLEMIA: ICD-10-CM

## 2022-01-06 RX ORDER — ESTRADIOL 0.1 MG/G
CREAM VAGINAL
Qty: 42.5 G | Refills: 5 | Status: SHIPPED | OUTPATIENT
Start: 2022-01-06 | End: 2022-03-17

## 2022-01-06 RX ORDER — ATORVASTATIN CALCIUM 10 MG/1
TABLET, FILM COATED ORAL
Qty: 90 TABLET | Refills: 2 | Status: SHIPPED | OUTPATIENT
Start: 2022-01-06 | End: 2022-05-26

## 2022-01-06 RX ORDER — ATORVASTATIN CALCIUM 10 MG/1
10 TABLET, FILM COATED ORAL EVERY EVENING
Qty: 90 TABLET | Refills: 2 | Status: SHIPPED | OUTPATIENT
Start: 2022-01-06 | End: 2022-09-12

## 2022-01-06 NOTE — TELEPHONE ENCOUNTER
Received request via: Pharmacy    Was the patient seen in the last year in this department? Yes    Does the patient have an active prescription (recently filled or refills available) for medication(s) requested? Yes. Requesting change to mail order.

## 2022-01-07 ENCOUNTER — TELEPHONE (OUTPATIENT)
Dept: MEDICAL GROUP | Facility: MEDICAL CENTER | Age: 67
End: 2022-01-07

## 2022-01-07 NOTE — TELEPHONE ENCOUNTER
----- Message from DUY Herrera sent at 1/6/2022  4:50 PM PST -----  Regarding: FW: bone density scan      ----- Message -----  From: Elizabeth Crum  Sent: 1/6/2022   3:57 PM PST  To: DUY Herrera  Subject: FW: bone density scan                              ----- Message -----  From: Shaunna Vieira  Sent: 1/6/2022   3:52 PM PST  To: Zahraa Cain  Subject: bone density scan                                Just talked to [employer] and they say you have to request the comparison be done. Please do so. If I have to pay out of pocket for it, I'm happy to do it. Thanks.

## 2022-03-17 ENCOUNTER — OFFICE VISIT (OUTPATIENT)
Dept: MEDICAL GROUP | Facility: MEDICAL CENTER | Age: 67
End: 2022-03-17
Payer: COMMERCIAL

## 2022-03-17 VITALS
HEIGHT: 62 IN | TEMPERATURE: 96.5 F | BODY MASS INDEX: 34.57 KG/M2 | OXYGEN SATURATION: 94 % | HEART RATE: 74 BPM | DIASTOLIC BLOOD PRESSURE: 68 MMHG | SYSTOLIC BLOOD PRESSURE: 122 MMHG

## 2022-03-17 DIAGNOSIS — M79.604 PAIN IN BOTH LOWER EXTREMITIES: ICD-10-CM

## 2022-03-17 DIAGNOSIS — M79.605 PAIN IN BOTH LOWER EXTREMITIES: ICD-10-CM

## 2022-03-17 PROBLEM — R93.1 ELEVATED CORONARY ARTERY CALCIUM SCORE: Status: ACTIVE | Noted: 2022-03-17

## 2022-03-17 PROCEDURE — 99214 OFFICE O/P EST MOD 30 MIN: CPT | Performed by: NURSE PRACTITIONER

## 2022-03-17 RX ORDER — HYDROCORTISONE 25 MG/G
CREAM TOPICAL
COMMUNITY
Start: 2022-01-21

## 2022-03-17 RX ORDER — MELOXICAM 7.5 MG/1
7.5 TABLET ORAL
Qty: 30 TABLET | Refills: 2 | Status: SHIPPED | OUTPATIENT
Start: 2022-03-17 | End: 2022-07-27 | Stop reason: SDUPTHER

## 2022-03-17 NOTE — PROGRESS NOTES
Subjective:   Shaunna Vieira is a 66 y.o. female here today for leg pain    Pain in both lower extremities  Patient reports chronic bilateral leg pains.     Has chronic left knee pain and right hip pain as well as lumbar pain. Was seeing [facility] for this, had acid and steroid injections in the past, had about 6 weeks of relief. She does this every 12-14 months or so.    Has been stretching, using topical CBD, horse stephan, excedrin as needed which helps for a few days, tries not to take a lot of medication.     She reports that she will be out working in the yard and then gets a heaviness sensation of her legs and dull pain in her thighs, calves, or joints, pain moves around, not always in the same locations.     Does get some numbness and tingling in feet and legs intermittently. Was told in the past it was sciatica.     Excedrin does help, symptoms significantly improve for about 2 days.     Not doing any significant exercise currently, does ride her bike about 3 miles a few days per week in the summer. Has a rower at home and some exercise equipment but hasn't been using this. Has been trying to        Current medicines (including changes today)  Current Outpatient Medications   Medication Sig Dispense Refill   • PROCTOSOL HC 2.5 % Cream APPLY 1 PEASIZED AMOUNT TOPICALLY TO AFFECTED AREA TWICE DAILY FOR TWO WEEKS     • atorvastatin (LIPITOR) 10 MG Tab TAKE 1 TABLET BY MOUTH EVERY DAY IN THE EVENING 90 Tablet 2   • atorvastatin (LIPITOR) 10 MG Tab Take 1 Tablet by mouth every evening. 90 Tablet 2   • clindamycin (CLEOCIN) 1 % Solution Apply 1 Application topically 2 times a day. Apply 1-2 times daily to affected areas 60 mL 3   • Wound Cleansers (LEVICYN DERMAL SPRAY EX) by Apply externally route.     • Dermatological Products, Misc. (HPR PLUS HYDROGEL) Kit by Apply externally route as needed.     • ketoconazole (NIZORAL) 2 % Cream Apply  to affected area(s) as needed.     • triamcinolone acetonide  "(KENALOG) 0.1 % Cream Apply  to affected area(s) as needed.     • fluticasone (FLONASE) 50 MCG/ACT nasal spray USE 2 SPRAYS IN EACH NOSTRIL DAILY AS NEEDED 16 g 3   • mupirocin (BACTROBAN) 2 % OINT Apply  to affected area(s) as needed.     • Glucosamine-Chondroit-Vit C-Mn (GLUCOSAMINE CHONDR 1500 COMPLX PO) Take  by mouth every day.     • chlorhexidine (HIBICLENS) 4 % liquid Apply  to affected area(s) 1 time daily as needed.     • Multiple Vitamin (MULTI-VITAMIN DAILY PO) Take  by mouth.       No current facility-administered medications for this visit.     She  has a past medical history of Abnormal uterine bleeding, Abscess, Acne, Anesthesia, Breast mass, right, Colon polyps, Dermatitis, Elevated random blood glucose level, Elevated transaminase level, Environmental and seasonal allergies, GERD (gastroesophageal reflux disease), Hyperlipidemia, Migraine, Obesity, Osteoarthritis, Pain (04/19/2018), Snoring, Tinnitus, and Vitamin D deficiency.    ROS   No chest pain, no shortness of breath, no abdominal pain  Positive ROS as per HPI.  All other systems reviewed and are negative.     Objective:     /68 (BP Location: Right arm, Patient Position: Sitting, BP Cuff Size: Adult)   Pulse 74   Temp 35.8 °C (96.5 °F) (Temporal)   Ht 1.575 m (5' 2\")   SpO2 94%  Body mass index is 34.57 kg/m².     Physical Exam:  Constitutional: Alert, no distress.  Skin: Warm, dry, good turgor, no rashes in visible areas.  Eye: Equal, round and reactive, conjunctiva clear, lids normal.  ENMT: Mask in place  Respiratory: Unlabored respiratory effort  Psych: Alert and oriented x3, normal affect and mood.  LE exam: No edema. Scattered varicose veins. Bilateral knees unremarkable. Full ROM      Assessment and Plan:   The following treatment plan was discussed    1. Pain in both lower extremities  Unstable  Symptoms seem most consistent with combination of osteoarthritis and deconditioning.   Advised patient to significantly increase " physical activity, core strengthening, and aerobic exercise  Use meloxicam as needed, sparingly, for pain  If symptoms not improved, consider JAMEL to evaluate for arterial insufficiency, has already had venous insufficiency workup.       Followup: Return if symptoms worsen or fail to improve.    I have placed the below orders and discussed them with an approved delegating provider. The MA is performing the below orders under the direction of Dr. Lizama

## 2022-03-17 NOTE — ASSESSMENT & PLAN NOTE
Patient reports chronic bilateral leg pains.     Has chronic left knee pain and right hip pain as well as lumbar pain. Was seeing [facility] for this, had acid and steroid injections in the past, had about 6 weeks of relief. She does this every 12-14 months or so.    Has been stretching, using topical CBD, horse stephan, excedrin as needed which helps for a few days, tries not to take a lot of medication.     She reports that she will be out working in the yard and then gets a heaviness sensation of her legs and dull pain in her thighs, calves, or joints, pain moves around, not always in the same locations.     Does get some numbness and tingling in feet and legs intermittently. Was told in the past it was sciatica.     Excedrin does help, symptoms significantly improve for about 2 days.     Not doing any significant exercise currently, does ride her bike about 3 miles a few days per week in the summer. Has a rower at home and some exercise equipment but hasn't been using this. Has been trying to

## 2022-04-01 ENCOUNTER — PATIENT MESSAGE (OUTPATIENT)
Dept: MEDICAL GROUP | Facility: MEDICAL CENTER | Age: 67
End: 2022-04-01
Payer: COMMERCIAL

## 2022-04-01 DIAGNOSIS — L30.9 DERMATITIS: ICD-10-CM

## 2022-04-12 RX ORDER — KETOCONAZOLE 20 MG/G
1 CREAM TOPICAL 2 TIMES DAILY PRN
Qty: 30 G | Refills: 3 | Status: SHIPPED | OUTPATIENT
Start: 2022-04-12 | End: 2023-01-16

## 2022-05-25 PROBLEM — M17.12 OSTEOARTHRITIS OF LEFT KNEE: Status: ACTIVE | Noted: 2022-05-25

## 2022-05-26 ASSESSMENT — FIBROSIS 4 INDEX: FIB4 SCORE: .92237804071866325

## 2022-06-06 ENCOUNTER — HOSPITAL ENCOUNTER (OUTPATIENT)
Dept: RADIOLOGY | Facility: MEDICAL CENTER | Age: 67
End: 2022-06-06
Attending: NURSE PRACTITIONER
Payer: COMMERCIAL

## 2022-06-06 DIAGNOSIS — Z12.31 VISIT FOR SCREENING MAMMOGRAM: ICD-10-CM

## 2022-06-06 PROCEDURE — 77063 BREAST TOMOSYNTHESIS BI: CPT

## 2022-06-28 ENCOUNTER — PRE-ADMISSION TESTING (OUTPATIENT)
Dept: ADMISSIONS | Facility: MEDICAL CENTER | Age: 67
End: 2022-06-28
Attending: ORTHOPAEDIC SURGERY
Payer: COMMERCIAL

## 2022-06-28 DIAGNOSIS — Z01.812 PRE-OPERATIVE LABORATORY EXAMINATION: ICD-10-CM

## 2022-06-28 LAB
ANION GAP SERPL CALC-SCNC: 10 MMOL/L (ref 7–16)
BUN SERPL-MCNC: 13 MG/DL (ref 8–22)
CALCIUM SERPL-MCNC: 9.3 MG/DL (ref 8.4–10.2)
CHLORIDE SERPL-SCNC: 100 MMOL/L (ref 96–112)
CO2 SERPL-SCNC: 27 MMOL/L (ref 20–33)
CREAT SERPL-MCNC: 0.75 MG/DL (ref 0.5–1.4)
ERYTHROCYTE [DISTWIDTH] IN BLOOD BY AUTOMATED COUNT: 40.4 FL (ref 35.9–50)
GFR SERPLBLD CREATININE-BSD FMLA CKD-EPI: 87 ML/MIN/1.73 M 2
GLUCOSE SERPL-MCNC: 99 MG/DL (ref 65–99)
HCT VFR BLD AUTO: 42.1 % (ref 37–47)
HGB BLD-MCNC: 14.2 G/DL (ref 12–16)
MCH RBC QN AUTO: 29.3 PG (ref 27–33)
MCHC RBC AUTO-ENTMCNC: 33.7 G/DL (ref 33.6–35)
MCV RBC AUTO: 86.8 FL (ref 81.4–97.8)
PLATELET # BLD AUTO: 234 K/UL (ref 164–446)
PMV BLD AUTO: 10.2 FL (ref 9–12.9)
POTASSIUM SERPL-SCNC: 4.1 MMOL/L (ref 3.6–5.5)
RBC # BLD AUTO: 4.85 M/UL (ref 4.2–5.4)
SCCMEC + MECA PNL NOSE NAA+PROBE: NEGATIVE
SCCMEC + MECA PNL NOSE NAA+PROBE: NEGATIVE
SODIUM SERPL-SCNC: 137 MMOL/L (ref 135–145)
WBC # BLD AUTO: 5.2 K/UL (ref 4.8–10.8)

## 2022-06-28 PROCEDURE — 85027 COMPLETE CBC AUTOMATED: CPT

## 2022-06-28 PROCEDURE — 36415 COLL VENOUS BLD VENIPUNCTURE: CPT

## 2022-06-28 PROCEDURE — 87640 STAPH A DNA AMP PROBE: CPT

## 2022-06-28 PROCEDURE — 80048 BASIC METABOLIC PNL TOTAL CA: CPT

## 2022-06-28 PROCEDURE — 87641 MR-STAPH DNA AMP PROBE: CPT

## 2022-06-28 RX ORDER — LEVOCETIRIZINE DIHYDROCHLORIDE 5 MG/1
TABLET, FILM COATED ORAL DAILY
COMMUNITY

## 2022-06-28 ASSESSMENT — FIBROSIS 4 INDEX: FIB4 SCORE: .92237804071866325

## 2022-06-28 NOTE — PREPROCEDURE INSTRUCTIONS
Per anesthesia protocol instructed to take the following medications DOS: Flonase, Xyzal.  Instructed to hold topicals DOS

## 2022-06-28 NOTE — DISCHARGE PLANNING
DISCHARGE PLANNING NOTE - TOTAL JOINT    Procedure: Procedure(s):  LEFT ARTHROPLASTY, KNEE, TOTAL  Procedure Date: 7/12/2022  Insurance: Payor: /    Equipment currently available at home?  cane and crutches  Steps into the home? 2  Steps within the home? 1 flight  Toilet height? Standard  Type of shower? walk-in shower  Who will be with you during your recovery? Brother  Is Outpatient Physical Therapy set up after surgery? Yes  Did you take the Total Joint Class and where? Yes  Planning same day discharge?Yes     This writer met with pt during her preadmission appt. Pt will need a fww. Home safety checklist reviewed and copy given to pt. Pt educated to dc criteria. All questions answered and verbalizes understanding of all instructions. No dc needs identified at this time. Anticipate dc to home without barriers.

## 2022-06-29 ENCOUNTER — PATIENT MESSAGE (OUTPATIENT)
Dept: MEDICAL GROUP | Facility: MEDICAL CENTER | Age: 67
End: 2022-06-29

## 2022-07-11 NOTE — H&P
"Surgery Orthopedic History & Physical Note    Date  7/10/2022    Primary Care Physician  DUY Herrera      Pre-Op Diagnosis Codes:     * Osteoarthritis of left knee [M17.12]    HPI  This is a 66 y.o. female who presented with left knee pain which is been going on for several years.  Hurts getting up and down from seated position.  Hurts walking extended periods of time.  She had a cortisone injection in the past which she did get some effect from for about 6 weeks.  She tried physical therapy.  Currently feels like her knee is more unstable and painful.  Pain Assessment  Pain Assessment: 0-10  Pain Score: 2    Past Medical History:   Diagnosis Date   • Abnormal uterine bleeding    • Abscess     skin rashes, ulcerations   • Acne    • Adverse effect of anesthesia     prolonged \"fog\"   • Anesthesia     confused and drowsy for a week afterwards   • Breast mass, right    • Colon polyps    • Dermatitis    • Elevated random blood glucose level    • Elevated transaminase level    • Environmental and seasonal allergies    • GERD (gastroesophageal reflux disease)    • Hyperlipidemia    • Migraine    • Obesity    • Osteoarthritis    • Pain 04/19/2018    left knee and hip, 0/10   • Snoring     no sleep  study   • Tinnitus    • Vitamin D deficiency        Past Surgical History:   Procedure Laterality Date   • HYSTEROSCOPY WITH MYOSURE  5/4/2018    Procedure: HYSTEROSCOPY WITH MYOSURE/ WITH POLYPECTOMY;  Surgeon: Lise Perrin M.D.;  Location: SURGERY SAME DAY [facility];  Service: Gynecology   • DILATION AND CURETTAGE  5/4/2018    Procedure: DILATION AND CURETTAGE;  Surgeon: Lise Perrin M.D.;  Location: SURGERY SAME DAY [facility];  Service: Gynecology   • KNEE ARTHROSCOPY Left 7/17/2015    Procedure: KNEE ARTHROSCOPY;  Surgeon: Fantasma Dominique M.D.;  Location: SURGERY [facility];  Service:    • MENISCECTOMY Left 7/17/2015    Procedure: MENISCECTOMY PARTIAL LATERAL;  Surgeon: Fantasma Dominique, " M.D.;  Location: SURGERY [facility];  Service:    • SYNOVECTOMY Left 7/17/2015    Procedure: SYNOVECTOMY;  Surgeon: Fantasma Dominique M.D.;  Location: SURGERY [facility];  Service:    • OTHER  2015    colonoscopy   • BREAST BIOPSY         No current facility-administered medications for this encounter.     Current Outpatient Medications   Medication Sig Dispense Refill   • Levocetirizine Dihydrochloride (XYZAL ALLERGY 24HR) 5 MG Tab Take  by mouth every day.     • ketoconazole (NIZORAL) 2 % Cream Apply 1 Application topically 2 times a day as needed (rash). 30 g 3   • PROCTOSOL HC 2.5 % Cream APPLY 1 PEASIZED AMOUNT TOPICALLY TO AFFECTED AREA TWICE DAILY FOR TWO WEEKS     • meloxicam (MOBIC) 7.5 MG Tab Take 1 Tablet by mouth 1 time a day as needed for Moderate Pain or Inflammation. 30 Tablet 2   • atorvastatin (LIPITOR) 10 MG Tab Take 1 Tablet by mouth every evening. 90 Tablet 2   • clindamycin (CLEOCIN) 1 % Solution Apply 1 Application topically 2 times a day. Apply 1-2 times daily to affected areas 60 mL 3   • triamcinolone acetonide (KENALOG) 0.1 % Cream Apply  to affected area(s) as needed.     • fluticasone (FLONASE) 50 MCG/ACT nasal spray USE 2 SPRAYS IN EACH NOSTRIL DAILY AS NEEDED 16 g 3   • mupirocin (BACTROBAN) 2 % OINT Apply  to affected area(s) as needed.     • Glucosamine-Chondroit-Vit C-Mn (GLUCOSAMINE CHONDR 1500 COMPLX PO) Take  by mouth every day.     • Multiple Vitamin (MULTI-VITAMIN DAILY PO) Take  by mouth.         Social History     Occupational History   • Not on file   Tobacco Use   • Smoking status: Never Smoker   • Smokeless tobacco: Never Used   Substance and Sexual Activity   • Alcohol use: Yes     Comment: 1 per month   • Drug use: No   • Sexual activity: Never       Family History   Problem Relation Age of Onset   • Other Father         PARKINSON'S   • Cancer Father         lymphoma   • Diabetes Father    • Breast Cancer Mother    • Other Mother         BLINDNESS/MAC  DEG/SOKER   • Arthritis Mother    • Cancer Mother    • Stroke Mother         TIA   • Arthritis Brother    • Other Other         ASBERGERS   • Cancer Maternal Grandmother         asbestos       Allergies  Morphine, Nystatin, Prilosec [omeprazole], Sulfa drugs, Pravastatin, Cobalt, and Lanolin    Review of Systems  Pertinent ROS in HPI. Other ROS reviewed and found to be otherwise unremarkable.       Physical Exam  Patient walks with a normal gait  Moderate joint effusion left knee  Range of motion 5-125 left knee  Stable varus valgus stress left knee  Neurovascular intact left lower extremity    Radiology:  Osteoarthritis with loss of joint space osteophyte production cyst formation left knee      Assessment/Plan:  Pre-Op Diagnosis Codes:     * Osteoarthritis of left knee [M17.12]  Procedure(s):  LEFT ARTHROPLASTY, KNEE, TOTAL    Patient has tried and failed conservative management for her knee arthritis.  She is indicated for a left TKA.  Risks and benefits of surgery were discussed.  All questions were answered.    She wishes to proceed.       Patient has been educated about choice for planned prosthesis and rationale for its use.  Patient understands and wishes to proceed.

## 2022-07-12 ENCOUNTER — ANESTHESIA EVENT (OUTPATIENT)
Dept: SURGERY | Facility: MEDICAL CENTER | Age: 67
End: 2022-07-12
Payer: COMMERCIAL

## 2022-07-12 ENCOUNTER — HOSPITAL ENCOUNTER (OUTPATIENT)
Facility: MEDICAL CENTER | Age: 67
End: 2022-07-13
Attending: ORTHOPAEDIC SURGERY | Admitting: ORTHOPAEDIC SURGERY
Payer: COMMERCIAL

## 2022-07-12 ENCOUNTER — APPOINTMENT (OUTPATIENT)
Dept: RADIOLOGY | Facility: MEDICAL CENTER | Age: 67
End: 2022-07-12
Attending: STUDENT IN AN ORGANIZED HEALTH CARE EDUCATION/TRAINING PROGRAM
Payer: COMMERCIAL

## 2022-07-12 ENCOUNTER — ANESTHESIA (OUTPATIENT)
Dept: SURGERY | Facility: MEDICAL CENTER | Age: 67
End: 2022-07-12
Payer: COMMERCIAL

## 2022-07-12 DIAGNOSIS — M17.12 PRIMARY OSTEOARTHRITIS OF LEFT KNEE: ICD-10-CM

## 2022-07-12 PROCEDURE — 700101 HCHG RX REV CODE 250: Performed by: STUDENT IN AN ORGANIZED HEALTH CARE EDUCATION/TRAINING PROGRAM

## 2022-07-12 PROCEDURE — 700101 HCHG RX REV CODE 250: Performed by: ORTHOPAEDIC SURGERY

## 2022-07-12 PROCEDURE — A9270 NON-COVERED ITEM OR SERVICE: HCPCS | Performed by: PHYSICIAN ASSISTANT

## 2022-07-12 PROCEDURE — 160009 HCHG ANES TIME/MIN: Performed by: ORTHOPAEDIC SURGERY

## 2022-07-12 PROCEDURE — 502240 HCHG MISC OR SUPPLY RC 0272: Performed by: ORTHOPAEDIC SURGERY

## 2022-07-12 PROCEDURE — 64447 NJX AA&/STRD FEMORAL NRV IMG: CPT | Performed by: ORTHOPAEDIC SURGERY

## 2022-07-12 PROCEDURE — 97165 OT EVAL LOW COMPLEX 30 MIN: CPT

## 2022-07-12 PROCEDURE — 700111 HCHG RX REV CODE 636 W/ 250 OVERRIDE (IP): Performed by: STUDENT IN AN ORGANIZED HEALTH CARE EDUCATION/TRAINING PROGRAM

## 2022-07-12 PROCEDURE — 73560 X-RAY EXAM OF KNEE 1 OR 2: CPT | Mod: LT

## 2022-07-12 PROCEDURE — A9270 NON-COVERED ITEM OR SERVICE: HCPCS | Performed by: STUDENT IN AN ORGANIZED HEALTH CARE EDUCATION/TRAINING PROGRAM

## 2022-07-12 PROCEDURE — 700105 HCHG RX REV CODE 258: Performed by: STUDENT IN AN ORGANIZED HEALTH CARE EDUCATION/TRAINING PROGRAM

## 2022-07-12 PROCEDURE — C1713 ANCHOR/SCREW BN/BN,TIS/BN: HCPCS | Performed by: ORTHOPAEDIC SURGERY

## 2022-07-12 PROCEDURE — 160048 HCHG OR STATISTICAL LEVEL 1-5: Performed by: ORTHOPAEDIC SURGERY

## 2022-07-12 PROCEDURE — 01402 ANES OPN/ARTH TOT KNE ARTHRP: CPT | Performed by: STUDENT IN AN ORGANIZED HEALTH CARE EDUCATION/TRAINING PROGRAM

## 2022-07-12 PROCEDURE — 160035 HCHG PACU - 1ST 60 MINS PHASE I: Performed by: ORTHOPAEDIC SURGERY

## 2022-07-12 PROCEDURE — 700102 HCHG RX REV CODE 250 W/ 637 OVERRIDE(OP): Performed by: STUDENT IN AN ORGANIZED HEALTH CARE EDUCATION/TRAINING PROGRAM

## 2022-07-12 PROCEDURE — 160002 HCHG RECOVERY MINUTES (STAT): Performed by: ORTHOPAEDIC SURGERY

## 2022-07-12 PROCEDURE — 160036 HCHG PACU - EA ADDL 30 MINS PHASE I: Performed by: ORTHOPAEDIC SURGERY

## 2022-07-12 PROCEDURE — 94760 N-INVAS EAR/PLS OXIMETRY 1: CPT

## 2022-07-12 PROCEDURE — 700105 HCHG RX REV CODE 258: Performed by: ORTHOPAEDIC SURGERY

## 2022-07-12 PROCEDURE — G0378 HOSPITAL OBSERVATION PER HR: HCPCS

## 2022-07-12 PROCEDURE — 96365 THER/PROPH/DIAG IV INF INIT: CPT

## 2022-07-12 PROCEDURE — 160029 HCHG SURGERY MINUTES - 1ST 30 MINS LEVEL 4: Performed by: ORTHOPAEDIC SURGERY

## 2022-07-12 PROCEDURE — 97163 PT EVAL HIGH COMPLEX 45 MIN: CPT

## 2022-07-12 PROCEDURE — 76942 ECHO GUIDE FOR BIOPSY: CPT | Mod: 26 | Performed by: STUDENT IN AN ORGANIZED HEALTH CARE EDUCATION/TRAINING PROGRAM

## 2022-07-12 PROCEDURE — 27447 TOTAL KNEE ARTHROPLASTY: CPT | Mod: LT | Performed by: ORTHOPAEDIC SURGERY

## 2022-07-12 PROCEDURE — 160041 HCHG SURGERY MINUTES - EA ADDL 1 MIN LEVEL 4: Performed by: ORTHOPAEDIC SURGERY

## 2022-07-12 PROCEDURE — C1776 JOINT DEVICE (IMPLANTABLE): HCPCS | Performed by: ORTHOPAEDIC SURGERY

## 2022-07-12 PROCEDURE — 96375 TX/PRO/DX INJ NEW DRUG ADDON: CPT

## 2022-07-12 PROCEDURE — 97535 SELF CARE MNGMENT TRAINING: CPT

## 2022-07-12 PROCEDURE — 700111 HCHG RX REV CODE 636 W/ 250 OVERRIDE (IP): Performed by: ORTHOPAEDIC SURGERY

## 2022-07-12 PROCEDURE — 700102 HCHG RX REV CODE 250 W/ 637 OVERRIDE(OP): Performed by: PHYSICIAN ASSISTANT

## 2022-07-12 PROCEDURE — 64447 NJX AA&/STRD FEMORAL NRV IMG: CPT | Mod: 59 | Performed by: STUDENT IN AN ORGANIZED HEALTH CARE EDUCATION/TRAINING PROGRAM

## 2022-07-12 PROCEDURE — 27447 TOTAL KNEE ARTHROPLASTY: CPT | Mod: 80ROC,LT | Performed by: STUDENT IN AN ORGANIZED HEALTH CARE EDUCATION/TRAINING PROGRAM

## 2022-07-12 DEVICE — IMPLANTABLE DEVICE: Type: IMPLANTABLE DEVICE | Site: KNEE | Status: FUNCTIONAL

## 2022-07-12 DEVICE — IMPLANT GII OVAL RESURFACING PAT 29MM (1EA): Type: IMPLANTABLE DEVICE | Site: KNEE | Status: FUNCTIONAL

## 2022-07-12 DEVICE — BASE TIBIAL NONPOROUS JOURNEY II LEFT SIZE 3 (1EA): Type: IMPLANTABLE DEVICE | Site: KNEE | Status: FUNCTIONAL

## 2022-07-12 DEVICE — CEMENT ORTHOPEDIC HV US  (10/PK): Type: IMPLANTABLE DEVICE | Site: KNEE | Status: FUNCTIONAL

## 2022-07-12 RX ORDER — ATORVASTATIN CALCIUM 10 MG/1
10 TABLET, FILM COATED ORAL EVERY EVENING
Status: DISCONTINUED | OUTPATIENT
Start: 2022-07-12 | End: 2022-07-13 | Stop reason: HOSPADM

## 2022-07-12 RX ORDER — ONDANSETRON 2 MG/ML
INJECTION INTRAMUSCULAR; INTRAVENOUS PRN
Status: DISCONTINUED | OUTPATIENT
Start: 2022-07-12 | End: 2022-07-12 | Stop reason: SURG

## 2022-07-12 RX ORDER — BUPIVACAINE HYDROCHLORIDE AND EPINEPHRINE 2.5; 5 MG/ML; UG/ML
INJECTION, SOLUTION EPIDURAL; INFILTRATION; INTRACAUDAL; PERINEURAL
Status: DISCONTINUED | OUTPATIENT
Start: 2022-07-12 | End: 2022-07-12 | Stop reason: HOSPADM

## 2022-07-12 RX ORDER — DEXAMETHASONE SODIUM PHOSPHATE 4 MG/ML
4 INJECTION, SOLUTION INTRA-ARTICULAR; INTRALESIONAL; INTRAMUSCULAR; INTRAVENOUS; SOFT TISSUE
Status: COMPLETED | OUTPATIENT
Start: 2022-07-12 | End: 2022-07-13

## 2022-07-12 RX ORDER — TRAMADOL HYDROCHLORIDE 50 MG/1
50 TABLET ORAL EVERY 4 HOURS PRN
Status: DISCONTINUED | OUTPATIENT
Start: 2022-07-12 | End: 2022-07-13 | Stop reason: HOSPADM

## 2022-07-12 RX ORDER — DIPHENHYDRAMINE HYDROCHLORIDE 50 MG/ML
25 INJECTION INTRAMUSCULAR; INTRAVENOUS EVERY 6 HOURS PRN
Status: DISCONTINUED | OUTPATIENT
Start: 2022-07-12 | End: 2022-07-13 | Stop reason: HOSPADM

## 2022-07-12 RX ORDER — HYDROMORPHONE HYDROCHLORIDE 1 MG/ML
0.5 INJECTION, SOLUTION INTRAMUSCULAR; INTRAVENOUS; SUBCUTANEOUS
Status: DISCONTINUED | OUTPATIENT
Start: 2022-07-12 | End: 2022-07-13 | Stop reason: HOSPADM

## 2022-07-12 RX ORDER — HYDROMORPHONE HYDROCHLORIDE 1 MG/ML
0.2 INJECTION, SOLUTION INTRAMUSCULAR; INTRAVENOUS; SUBCUTANEOUS
Status: DISCONTINUED | OUTPATIENT
Start: 2022-07-12 | End: 2022-07-12 | Stop reason: HOSPADM

## 2022-07-12 RX ORDER — CEFAZOLIN SODIUM 1 G/3ML
INJECTION, POWDER, FOR SOLUTION INTRAMUSCULAR; INTRAVENOUS PRN
Status: DISCONTINUED | OUTPATIENT
Start: 2022-07-12 | End: 2022-07-12 | Stop reason: SURG

## 2022-07-12 RX ORDER — ENEMA 19; 7 G/133ML; G/133ML
1 ENEMA RECTAL
Status: DISCONTINUED | OUTPATIENT
Start: 2022-07-12 | End: 2022-07-13 | Stop reason: HOSPADM

## 2022-07-12 RX ORDER — MIDAZOLAM HYDROCHLORIDE 1 MG/ML
1 INJECTION INTRAMUSCULAR; INTRAVENOUS
Status: DISCONTINUED | OUTPATIENT
Start: 2022-07-12 | End: 2022-07-12 | Stop reason: HOSPADM

## 2022-07-12 RX ORDER — HYDROMORPHONE HYDROCHLORIDE 2 MG/ML
INJECTION, SOLUTION INTRAMUSCULAR; INTRAVENOUS; SUBCUTANEOUS PRN
Status: DISCONTINUED | OUTPATIENT
Start: 2022-07-12 | End: 2022-07-12 | Stop reason: SURG

## 2022-07-12 RX ORDER — TRANEXAMIC ACID 100 MG/ML
1000 INJECTION, SOLUTION INTRAVENOUS ONCE
Status: DISCONTINUED | OUTPATIENT
Start: 2022-07-12 | End: 2022-07-12 | Stop reason: HOSPADM

## 2022-07-12 RX ORDER — ONDANSETRON 2 MG/ML
4 INJECTION INTRAMUSCULAR; INTRAVENOUS EVERY 4 HOURS PRN
Status: DISCONTINUED | OUTPATIENT
Start: 2022-07-12 | End: 2022-07-13 | Stop reason: HOSPADM

## 2022-07-12 RX ORDER — MEPERIDINE HYDROCHLORIDE 25 MG/ML
12.5 INJECTION INTRAMUSCULAR; INTRAVENOUS; SUBCUTANEOUS
Status: DISCONTINUED | OUTPATIENT
Start: 2022-07-12 | End: 2022-07-12 | Stop reason: HOSPADM

## 2022-07-12 RX ORDER — DOCUSATE SODIUM 100 MG/1
100 CAPSULE, LIQUID FILLED ORAL 2 TIMES DAILY
Status: DISCONTINUED | OUTPATIENT
Start: 2022-07-12 | End: 2022-07-13 | Stop reason: HOSPADM

## 2022-07-12 RX ORDER — IBUPROFEN 400 MG/1
800 TABLET ORAL 3 TIMES DAILY PRN
Status: DISCONTINUED | OUTPATIENT
Start: 2022-07-15 | End: 2022-07-13 | Stop reason: HOSPADM

## 2022-07-12 RX ORDER — IPRATROPIUM BROMIDE AND ALBUTEROL SULFATE 2.5; .5 MG/3ML; MG/3ML
3 SOLUTION RESPIRATORY (INHALATION)
Status: DISCONTINUED | OUTPATIENT
Start: 2022-07-12 | End: 2022-07-12 | Stop reason: HOSPADM

## 2022-07-12 RX ORDER — TRAMADOL HYDROCHLORIDE 50 MG/1
50 TABLET ORAL EVERY 4 HOURS PRN
Qty: 60 TABLET | Refills: 0 | Status: SHIPPED | OUTPATIENT
Start: 2022-07-12 | End: 2022-07-22

## 2022-07-12 RX ORDER — SCOLOPAMINE TRANSDERMAL SYSTEM 1 MG/1
1 PATCH, EXTENDED RELEASE TRANSDERMAL
Status: DISCONTINUED | OUTPATIENT
Start: 2022-07-12 | End: 2022-07-13 | Stop reason: HOSPADM

## 2022-07-12 RX ORDER — FLUTICASONE PROPIONATE 50 MCG
2 SPRAY, SUSPENSION (ML) NASAL DAILY
Status: DISCONTINUED | OUTPATIENT
Start: 2022-07-12 | End: 2022-07-13 | Stop reason: HOSPADM

## 2022-07-12 RX ORDER — HALOPERIDOL 5 MG/ML
1 INJECTION INTRAMUSCULAR
Status: DISCONTINUED | OUTPATIENT
Start: 2022-07-12 | End: 2022-07-12 | Stop reason: HOSPADM

## 2022-07-12 RX ORDER — OXYCODONE HYDROCHLORIDE 5 MG/1
5 TABLET ORAL EVERY 4 HOURS PRN
Qty: 42 TABLET | Refills: 0 | Status: SHIPPED | OUTPATIENT
Start: 2022-07-12 | End: 2022-07-19

## 2022-07-12 RX ORDER — BISACODYL 10 MG
10 SUPPOSITORY, RECTAL RECTAL
Status: DISCONTINUED | OUTPATIENT
Start: 2022-07-12 | End: 2022-07-13 | Stop reason: HOSPADM

## 2022-07-12 RX ORDER — KETOROLAC TROMETHAMINE 30 MG/ML
15 INJECTION, SOLUTION INTRAMUSCULAR; INTRAVENOUS EVERY 6 HOURS
Status: DISCONTINUED | OUTPATIENT
Start: 2022-07-12 | End: 2022-07-13 | Stop reason: HOSPADM

## 2022-07-12 RX ORDER — OXYCODONE HYDROCHLORIDE 10 MG/1
10 TABLET ORAL
Status: DISCONTINUED | OUTPATIENT
Start: 2022-07-12 | End: 2022-07-13 | Stop reason: HOSPADM

## 2022-07-12 RX ORDER — OXYCODONE HCL 5 MG/5 ML
10 SOLUTION, ORAL ORAL
Status: DISCONTINUED | OUTPATIENT
Start: 2022-07-12 | End: 2022-07-12 | Stop reason: HOSPADM

## 2022-07-12 RX ORDER — AMOXICILLIN 250 MG
1 CAPSULE ORAL
Status: DISCONTINUED | OUTPATIENT
Start: 2022-07-12 | End: 2022-07-13 | Stop reason: HOSPADM

## 2022-07-12 RX ORDER — HALOPERIDOL 5 MG/ML
1 INJECTION INTRAMUSCULAR EVERY 6 HOURS PRN
Status: DISCONTINUED | OUTPATIENT
Start: 2022-07-12 | End: 2022-07-13 | Stop reason: HOSPADM

## 2022-07-12 RX ORDER — METOPROLOL TARTRATE 1 MG/ML
1 INJECTION, SOLUTION INTRAVENOUS
Status: DISCONTINUED | OUTPATIENT
Start: 2022-07-12 | End: 2022-07-12 | Stop reason: HOSPADM

## 2022-07-12 RX ORDER — AMOXICILLIN 250 MG
1 CAPSULE ORAL NIGHTLY
Status: DISCONTINUED | OUTPATIENT
Start: 2022-07-12 | End: 2022-07-13 | Stop reason: HOSPADM

## 2022-07-12 RX ORDER — LABETALOL HYDROCHLORIDE 5 MG/ML
5 INJECTION, SOLUTION INTRAVENOUS
Status: DISCONTINUED | OUTPATIENT
Start: 2022-07-12 | End: 2022-07-12 | Stop reason: HOSPADM

## 2022-07-12 RX ORDER — POLYETHYLENE GLYCOL 3350 17 G/17G
1 POWDER, FOR SOLUTION ORAL 2 TIMES DAILY PRN
Status: DISCONTINUED | OUTPATIENT
Start: 2022-07-12 | End: 2022-07-13 | Stop reason: HOSPADM

## 2022-07-12 RX ORDER — ASPIRIN 81 MG/1
81 TABLET ORAL
Qty: 60 TABLET | Refills: 0 | Status: SHIPPED | OUTPATIENT
Start: 2022-07-12 | End: 2022-08-11

## 2022-07-12 RX ORDER — DEXAMETHASONE SODIUM PHOSPHATE 4 MG/ML
10 INJECTION, SOLUTION INTRA-ARTICULAR; INTRALESIONAL; INTRAMUSCULAR; INTRAVENOUS; SOFT TISSUE ONCE
Status: DISCONTINUED | OUTPATIENT
Start: 2022-07-13 | End: 2022-07-13 | Stop reason: HOSPADM

## 2022-07-12 RX ORDER — ACETAMINOPHEN 500 MG
1000 TABLET ORAL EVERY 6 HOURS
Status: DISCONTINUED | OUTPATIENT
Start: 2022-07-12 | End: 2022-07-13 | Stop reason: HOSPADM

## 2022-07-12 RX ORDER — HYDROMORPHONE HYDROCHLORIDE 1 MG/ML
0.4 INJECTION, SOLUTION INTRAMUSCULAR; INTRAVENOUS; SUBCUTANEOUS
Status: DISCONTINUED | OUTPATIENT
Start: 2022-07-12 | End: 2022-07-12 | Stop reason: HOSPADM

## 2022-07-12 RX ORDER — OXYCODONE HCL 5 MG/5 ML
5 SOLUTION, ORAL ORAL
Status: DISCONTINUED | OUTPATIENT
Start: 2022-07-12 | End: 2022-07-12 | Stop reason: HOSPADM

## 2022-07-12 RX ORDER — ACETAMINOPHEN 500 MG
1000 TABLET ORAL EVERY 6 HOURS PRN
Status: DISCONTINUED | OUTPATIENT
Start: 2022-07-17 | End: 2022-07-13 | Stop reason: HOSPADM

## 2022-07-12 RX ORDER — DIPHENHYDRAMINE HYDROCHLORIDE 50 MG/ML
12.5 INJECTION INTRAMUSCULAR; INTRAVENOUS
Status: DISCONTINUED | OUTPATIENT
Start: 2022-07-12 | End: 2022-07-12 | Stop reason: HOSPADM

## 2022-07-12 RX ORDER — HYDROMORPHONE HYDROCHLORIDE 1 MG/ML
0.1 INJECTION, SOLUTION INTRAMUSCULAR; INTRAVENOUS; SUBCUTANEOUS
Status: DISCONTINUED | OUTPATIENT
Start: 2022-07-12 | End: 2022-07-12 | Stop reason: HOSPADM

## 2022-07-12 RX ORDER — HYDRALAZINE HYDROCHLORIDE 20 MG/ML
5 INJECTION INTRAMUSCULAR; INTRAVENOUS
Status: DISCONTINUED | OUTPATIENT
Start: 2022-07-12 | End: 2022-07-12 | Stop reason: HOSPADM

## 2022-07-12 RX ORDER — SODIUM CHLORIDE, SODIUM LACTATE, POTASSIUM CHLORIDE, CALCIUM CHLORIDE 600; 310; 30; 20 MG/100ML; MG/100ML; MG/100ML; MG/100ML
INJECTION, SOLUTION INTRAVENOUS CONTINUOUS
Status: ACTIVE | OUTPATIENT
Start: 2022-07-12 | End: 2022-07-12

## 2022-07-12 RX ORDER — ONDANSETRON 2 MG/ML
4 INJECTION INTRAMUSCULAR; INTRAVENOUS
Status: DISCONTINUED | OUTPATIENT
Start: 2022-07-12 | End: 2022-07-12 | Stop reason: HOSPADM

## 2022-07-12 RX ORDER — KETOROLAC TROMETHAMINE 30 MG/ML
INJECTION, SOLUTION INTRAMUSCULAR; INTRAVENOUS
Status: DISCONTINUED | OUTPATIENT
Start: 2022-07-12 | End: 2022-07-12 | Stop reason: HOSPADM

## 2022-07-12 RX ORDER — CEFAZOLIN SODIUM 1 G/3ML
2 INJECTION, POWDER, FOR SOLUTION INTRAMUSCULAR; INTRAVENOUS ONCE
Status: DISCONTINUED | OUTPATIENT
Start: 2022-07-12 | End: 2022-07-12 | Stop reason: HOSPADM

## 2022-07-12 RX ORDER — ROPIVACAINE HYDROCHLORIDE 5 MG/ML
INJECTION, SOLUTION EPIDURAL; INFILTRATION; PERINEURAL
Status: COMPLETED | OUTPATIENT
Start: 2022-07-12 | End: 2022-07-12

## 2022-07-12 RX ORDER — DEXAMETHASONE SODIUM PHOSPHATE 4 MG/ML
INJECTION, SOLUTION INTRA-ARTICULAR; INTRALESIONAL; INTRAMUSCULAR; INTRAVENOUS; SOFT TISSUE PRN
Status: DISCONTINUED | OUTPATIENT
Start: 2022-07-12 | End: 2022-07-12 | Stop reason: SURG

## 2022-07-12 RX ORDER — OXYCODONE HYDROCHLORIDE 5 MG/1
5 TABLET ORAL
Status: DISCONTINUED | OUTPATIENT
Start: 2022-07-12 | End: 2022-07-13 | Stop reason: HOSPADM

## 2022-07-12 RX ADMIN — FENTANYL CITRATE 100 MCG: 50 INJECTION, SOLUTION INTRAMUSCULAR; INTRAVENOUS at 08:36

## 2022-07-12 RX ADMIN — BENZOCAINE AND MENTHOL 1 LOZENGE: 15; 3.6 LOZENGE ORAL at 21:37

## 2022-07-12 RX ADMIN — DEXAMETHASONE SODIUM PHOSPHATE 4 MG: 4 INJECTION, SOLUTION INTRA-ARTICULAR; INTRALESIONAL; INTRAMUSCULAR; INTRAVENOUS; SOFT TISSUE at 08:40

## 2022-07-12 RX ADMIN — OXYCODONE HYDROCHLORIDE 5 MG: 5 TABLET ORAL at 12:58

## 2022-07-12 RX ADMIN — SENNOSIDES AND DOCUSATE SODIUM 1 TABLET: 50; 8.6 TABLET ORAL at 21:39

## 2022-07-12 RX ADMIN — PROPOFOL 150 MG: 10 INJECTION, EMULSION INTRAVENOUS at 08:39

## 2022-07-12 RX ADMIN — KETOROLAC TROMETHAMINE 15 MG: 30 INJECTION, SOLUTION INTRAMUSCULAR; INTRAVENOUS at 17:20

## 2022-07-12 RX ADMIN — CEFAZOLIN 2 G: 2 INJECTION, POWDER, FOR SOLUTION INTRAMUSCULAR; INTRAVENOUS at 13:07

## 2022-07-12 RX ADMIN — DOCUSATE SODIUM 100 MG: 100 CAPSULE, LIQUID FILLED ORAL at 17:22

## 2022-07-12 RX ADMIN — ACETAMINOPHEN 1000 MG: 500 TABLET ORAL at 17:21

## 2022-07-12 RX ADMIN — PROPOFOL 50 MG: 10 INJECTION, EMULSION INTRAVENOUS at 09:51

## 2022-07-12 RX ADMIN — ROCURONIUM BROMIDE 50 MG: 10 INJECTION, SOLUTION INTRAVENOUS at 08:39

## 2022-07-12 RX ADMIN — ROPIVACAINE HYDROCHLORIDE 30 ML: 5 INJECTION, SOLUTION EPIDURAL; INFILTRATION; PERINEURAL at 08:05

## 2022-07-12 RX ADMIN — SODIUM CHLORIDE, POTASSIUM CHLORIDE, SODIUM LACTATE AND CALCIUM CHLORIDE: 600; 310; 30; 20 INJECTION, SOLUTION INTRAVENOUS at 08:33

## 2022-07-12 RX ADMIN — ASPIRIN 81 MG: 81 TABLET, COATED ORAL at 22:00

## 2022-07-12 RX ADMIN — ONDANSETRON 4 MG: 2 INJECTION INTRAMUSCULAR; INTRAVENOUS at 08:51

## 2022-07-12 RX ADMIN — ACETAMINOPHEN 1000 MG: 500 TABLET ORAL at 12:59

## 2022-07-12 RX ADMIN — CEFAZOLIN 2 G: 330 INJECTION, POWDER, FOR SOLUTION INTRAMUSCULAR; INTRAVENOUS at 08:40

## 2022-07-12 RX ADMIN — BENZOCAINE AND MENTHOL 1 LOZENGE: 15; 3.6 LOZENGE ORAL at 18:49

## 2022-07-12 RX ADMIN — ATORVASTATIN CALCIUM 10 MG: 10 TABLET, FILM COATED ORAL at 17:22

## 2022-07-12 RX ADMIN — HYDROMORPHONE HYDROCHLORIDE 0.4 MG: 2 INJECTION INTRAMUSCULAR; INTRAVENOUS; SUBCUTANEOUS at 08:36

## 2022-07-12 RX ADMIN — HYDROMORPHONE HYDROCHLORIDE 0.6 MG: 2 INJECTION INTRAMUSCULAR; INTRAVENOUS; SUBCUTANEOUS at 08:58

## 2022-07-12 ASSESSMENT — PAIN DESCRIPTION - PAIN TYPE
TYPE: SURGICAL PAIN

## 2022-07-12 ASSESSMENT — COGNITIVE AND FUNCTIONAL STATUS - GENERAL
SUGGESTED CMS G CODE MODIFIER MOBILITY: CK
DAILY ACTIVITIY SCORE: 18
MOVING TO AND FROM BED TO CHAIR: A LITTLE
DRESSING REGULAR LOWER BODY CLOTHING: A LOT
TURNING FROM BACK TO SIDE WHILE IN FLAT BAD: A LITTLE
MOBILITY SCORE: 18
MOVING TO AND FROM BED TO CHAIR: A LITTLE
SUGGESTED CMS G CODE MODIFIER DAILY ACTIVITY: CJ
SUGGESTED CMS G CODE MODIFIER DAILY ACTIVITY: CK
DRESSING REGULAR LOWER BODY CLOTHING: A LITTLE
DRESSING REGULAR UPPER BODY CLOTHING: A LITTLE
STANDING UP FROM CHAIR USING ARMS: A LITTLE
CLIMB 3 TO 5 STEPS WITH RAILING: A LITTLE
TOILETING: A LOT
MOBILITY SCORE: 19
MOVING FROM LYING ON BACK TO SITTING ON SIDE OF FLAT BED: A LITTLE
DRESSING REGULAR UPPER BODY CLOTHING: A LITTLE
MOVING FROM LYING ON BACK TO SITTING ON SIDE OF FLAT BED: A LITTLE
HELP NEEDED FOR BATHING: A LITTLE
STANDING UP FROM CHAIR USING ARMS: A LITTLE
CLIMB 3 TO 5 STEPS WITH RAILING: A LITTLE
WALKING IN HOSPITAL ROOM: A LITTLE
SUGGESTED CMS G CODE MODIFIER MOBILITY: CK
WALKING IN HOSPITAL ROOM: A LITTLE
HELP NEEDED FOR BATHING: A LITTLE
TOILETING: A LITTLE
DAILY ACTIVITIY SCORE: 20

## 2022-07-12 ASSESSMENT — LIFESTYLE VARIABLES
ON A TYPICAL DAY WHEN YOU DRINK ALCOHOL HOW MANY DRINKS DO YOU HAVE: 0
AVERAGE NUMBER OF DAYS PER WEEK YOU HAVE A DRINK CONTAINING ALCOHOL: 0
HAVE YOU EVER FELT YOU SHOULD CUT DOWN ON YOUR DRINKING: NO
TOTAL SCORE: 0
ALCOHOL_USE: NO
TOTAL SCORE: 0
HOW MANY TIMES IN THE PAST YEAR HAVE YOU HAD 5 OR MORE DRINKS IN A DAY: 0
EVER HAD A DRINK FIRST THING IN THE MORNING TO STEADY YOUR NERVES TO GET RID OF A HANGOVER: NO
CONSUMPTION TOTAL: NEGATIVE
EVER FELT BAD OR GUILTY ABOUT YOUR DRINKING: NO
TOTAL SCORE: 0
HAVE PEOPLE ANNOYED YOU BY CRITICIZING YOUR DRINKING: NO

## 2022-07-12 ASSESSMENT — GAIT ASSESSMENTS
ASSISTIVE DEVICE: FRONT WHEEL WALKER
DEVIATION: ANTALGIC;STEP TO
DISTANCE (FEET): 100
GAIT LEVEL OF ASSIST: CONTACT GUARD ASSIST

## 2022-07-12 ASSESSMENT — PATIENT HEALTH QUESTIONNAIRE - PHQ9
2. FEELING DOWN, DEPRESSED, IRRITABLE, OR HOPELESS: NOT AT ALL
SUM OF ALL RESPONSES TO PHQ9 QUESTIONS 1 AND 2: 0
1. LITTLE INTEREST OR PLEASURE IN DOING THINGS: NOT AT ALL

## 2022-07-12 ASSESSMENT — FIBROSIS 4 INDEX: FIB4 SCORE: 1.07

## 2022-07-12 ASSESSMENT — PAIN SCALES - GENERAL: PAIN_LEVEL: 5

## 2022-07-12 ASSESSMENT — ACTIVITIES OF DAILY LIVING (ADL): TOILETING: INDEPENDENT

## 2022-07-12 NOTE — LETTER
May 27, 2022    Patient Name: Shaunna Vieira  Surgeon Name: Pollo Brooks M.D.  Surgery Facility: [facility] ([id number] [address] [facility])  Surgery Date: 7/12/2022    The time of your surgery is not final and may change up to and until the day of your surgery. You will be contacted 24-48 hours prior to your surgery date with your check-in and surgery time.    If you will not be at one of the below numbers please call/text the surgery scheduler at [phone]  Preferred Phone: [phone]    BEFORE YOUR SURGERY   Pre Registration and/or Lab Work must be done within and no earlier than 28 days prior to your surgery date. Please call [facility] at [phone] for an appointment as soon as possible.    COVID testing is not required for non-symptomatic vaccinated patients with proof of vaccination at [facility].  For un-vaccinated patients please refer to the following instructions for your COVID testing requirements:    COVID test required 4-7 days prior to surgery, failure to do so can result in a cancellation.    The following locations offer COVID testing:    Approved facilities for COVID testing, if scheduled at [facility]:  · [facility] from 7:30am-3:30pm at [address], [place]  · [facility] [phone] (Please call for an appointment)  · Your local pharmacy    Not scheduled at [facility] contact the scheduled facility for approved testing facilities.    Please refrain from smoking any substance after midnight prior to surgery. Smoking may interfere with the anesthetic and frequently produces nausea during the recovery period.    Continue taking all lifesaving medications. Including the morning of your surgery with small sip of water.    Please read the MEDICATION INSTRUCTIONS below completely.    DAY OF YOUR SURGERY  Nothing to eat or drink after midnight     Please arrive at the hospital/surgery  center at the check-in time provided.     An adult will need to bring you and take you home after your surgery.     AFTER YOUR SURGERY  Post op Appointment:   Date: 7/28/22   Time: 1015   With: Pollo Brooks M.D.   Location: [address] [place] [id number]    - Therapy- Your first appointment should be 1  week(s) after your surgery. For your convenience we have 4 Physical Therapy locations: [place], [facility], [place], and [facility]. Call our office ASAP to schedule an appointment at [phone] or take the enclosed Therapy Prescription to a facility of your choice.  - No dental work for 3-6 months after your surgery.  - You must have someone provide transportation post surgery and someone to monitor you for at least 24 hours post-surgery. If you don't have either of these your appointment will be canceled.     TIME OFF WORK  FMLA or Disability forms can be faxed directly to: [phone] or you may drop them off at [facility] [place] [facility] [address]. Our office charges a $35.00 fee per form. Forms will be completed within 10 business days of drop off and payment received. For the status of your forms you may contact our disability office directly [phone].    MEDICATION INSTRUCTIONS  The following medications should be stopped a minimum of 10 days prior to surgery:  All over the counter, Supplements & Herbal medications    Anorectics: Phentermine (Adipex-P, Lomaira and Suprenza), Phentermine-topiramate (Qsymia), Bupropion-naltrexone (Contrave)    Opiod Partial Agonists/Opioid Antagonists: Buprenorphine (Subocone, Belbuca, Butrans, Probuphine Implant, Sublocade), Naltrexone (ReVia, Vivitrol), Naloxone    Amphetamines: Dextroamphetamine/Amphetamine (Adderall, Mydayis), Methylphenidate Hydrochloride (Concerta, Metadate, Methylin, Ritalin)    The following medications should be stopped 5 days prior to surgery:  Blood Thinners: Any Aspirin, Aspirin products, anti-inflammatories such as ibuprofen  and any blood thinners such as Coumadin and Plavix. Please consult your prescribing physician if you are on life saving blood thinners, in regards to when to stop medications prior to surgery.     The following medications should be stopped a minimum of 3 days prior to surgery:  PDE-5 inhibitors: Sildenafil (Viagra), Tadalafil (Cialis), Vardenafil (Levitra), Avanafil (Stendra)    MAO Inhibitors: Rasagiline (Azilect), Selegiline (Eldepryl, Emsam, Selapar), Isocarboxazid (Marplan), Phenelzine (Nardil)

## 2022-07-12 NOTE — ANESTHESIA PROCEDURE NOTES
Airway    Date/Time: 7/12/2022 8:40 AM  Performed by: Arun Nelson M.D.  Authorized by: Arun Nelson M.D.     Location:  OR  Urgency:  Elective  Indications for Airway Management:  Anesthesia      Spontaneous Ventilation: absent    Sedation Level:  Deep  Preoxygenated: Yes    Patient Position:  Sniffing  Final Airway Type:  Endotracheal airway  Final Endotracheal Airway:  ETT  Cuffed: Yes    Technique Used for Successful ETT Placement:  Direct laryngoscopy    Insertion Site:  Oral  Blade Type:  Griffith  Laryngoscope Blade/Videolaryngoscope Blade Size:  2  ETT Size (mm):  7.0  Measured from:  Teeth  ETT to Teeth (cm):  21  Placement Verified by: auscultation and capnometry    Cormack-Lehane Classification:  Grade I - full view of glottis  Number of Attempts at Approach:  1  Ventilation Between Attempts:  None  Number of Other Approaches Attempted:  0

## 2022-07-12 NOTE — FLOWSHEET NOTE
07/12/22 1252   Events/Summary/Plan   Events/Summary/Plan IS done pt reached over 60% goal   Vital Signs   Pulse 79   Respiration 18   Pulse Oximetry 96 %   $ Pulse Oximetry (Spot Check) Yes   Respiratory Assessment   Respiratory Pattern Within Normal Limits   Level of Consciousness Alert   Chest Exam   Work Of Breathing / Effort Within Normal Limits   Oxygen   O2 (LPM) 3   O2 Delivery Device Silicone Nasal Cannula

## 2022-07-12 NOTE — OR NURSING
1000 PT RECEIVED IN PACU, REPORT RECEIVED.  PT NON RESPONSIVE, RED OPA IN PLACE, CHANGED TO ORANGE OPA BY DR. PARRA, NASOPHARYNGEAL AIRWAY PLACE TO R NARE BY DR. PARRA FOR PATENT AIRWAY. VSS     1005 PT AWAKE, FOLLOWS DIRECTIONS. D/C OPA AND NASOPHARYNGEAL AIRWAY.      1015 VSS    1020 XRAY NOTIFIED. PILLOW UNDER L ANKLE FOR COMFORT.     1039 REPORT GIVEN TO NAEL LINCOLN

## 2022-07-12 NOTE — PROGRESS NOTES
4 Eyes Skin Assessment Completed by SALAZAR Kimball and SALAZAR King.    Head WDL  Ears WDL  Nose WDL  Mouth WDL  Neck WDL  Breast/Chest WDL  Shoulder Blades WDL  Spine WDL  (R) Arm/Elbow/Hand WDL  (L) Arm/Elbow/Hand WDL  Abdomen WDL  Groin WDL  Scrotum/Coccyx/Buttocks WDL  (R) Leg WDL  (L) Leg Incision left knee / drsg in place  (R) Heel/Foot/Toe WDL  (L) Heel/Foot/Toe WDL          Devices In Places SCD's      Interventions In Place Pressure Redistribution Mattress    Possible Skin Injury No    Pictures Uploaded Into Epic N/A  Wound Consult Placed N/A  RN Wound Prevention Protocol Ordered No

## 2022-07-12 NOTE — ANESTHESIA PROCEDURE NOTES
Peripheral Block    Date/Time: 7/12/2022 8:05 AM  Performed by: Arun Nelson M.D.  Authorized by: Arun Nelson M.D.     Start Time:  7/12/2022 8:05 AM  End Time:  7/12/2022 8:07 AM  Reason for Block: at surgeon's request and post-op pain management ONLY    patient identified, IV checked, site marked, risks and benefits discussed, surgical consent, monitors and equipment checked, pre-op evaluation and timeout performed    Patient Position:  Supine  Prep: ChloraPrep    Monitoring:  Heart rate, continuous pulse ox and cardiac monitor  Block Region:  Lower Extremity  Lower Extremity - Block Type:  Selective FEMORAL nerve block at the Adductor Canal    Laterality:  Left  Procedures: ultrasound guided  Image captured, interpreted and electronically stored.  Local Infiltration:  Lidocaine  Strength:  1 %  Dose:  3 ml  Block Type:  Single-shot  Needle Length:  100mm  Needle Gauge:  21 G  Needle Localization:  Ultrasound guidance  Injection Assessment:  Negative aspiration for heme, no paresthesia on injection, incremental injection and local visualized surrounding nerve on ultrasound  Evidence of intravascular injection: No     US Guided Selective Femoral Nerve Block at Adductor Canal:   US probe placed at mid-thigh level on externally rotated leg and femur identified.  Probe directed medially until Sartorius Muscle (SM), Femoral Artery (FA) and Saphenous Nerve (SN) identified in Adductor Canal (AC).  Needle inserted anterolateral to probe in an in plane approach into a subsartorial perivascular perineural position.  After negative aspiration LA injected with ease and visualized spreading within the AC.

## 2022-07-12 NOTE — DISCHARGE INSTRUCTIONS
Discharge Instructions:  The first week after your joint replacement is a time to recover from the surgery. We expect light exercise to keep you active and mobile.   Most patients are prescribed two medications for pain control: a narcotic such as Oxycodone or Hydrocodone or a milder medication called Tramadol. These can be alternated for pain control, and the priority is to decrease use of the stronger narcotic as soon as tolerated. In addition, you were prescribed Celebrex, an anti-inflammatory medication. Do not take any other types of anti-inflammatories with this medication. Last, you should take acetaminophen, either 500mg every 4 hours, or 1000mg every 8 hours, around the clock. Do not exceed 3000mg in a 24 hour period. The anti-inflammatory and acetaminophen will help reduce the amount of narcotic medication you require.      Take your pain medication as appropriate to ensure that your pain is not limiting your recovery. You'll be seen in clinic in 7-10 days (this appointment has already been made, call our office if you're unsure of the time). At that time, we'll prescribe your physical therapy to help with the recovery phase.      -Keep dressing clean and dry. Leave dressing in place until follow up. If you have an incisional vac dressing, the battery may die before your first post operative appointment, but you can leave the surgical dressing in place and it will be removed at your clinic visit. The battery of the dressing may die, and the sponge will inflate because it is no longer holding suction. You can still leave the dressing in place and it will be removed at the office. Call the office if you notice drainage from the surgical dressing.     -OK to shower, keep dressing in place. Pat dressing dry, do not rub incision     -Do not soak incision in bath, hot tub or pool     -Follow up with Dr. Brooks at regularly scheduled time     -Call [facility] office at [phone] for questions     -Weight bearing  status: as tolerated     -Take aspirin 81mg twice daily  for 1 month to prevent blood clots    Discharge Instructions for the Orthopedic Patient    Follow up with your Primary Care Provider within 2 weeks of discharge to home regarding the following:    Review of current medications and diagnostic testing.  Surveillance for medical complications.  Workup and treatment of osteoporosis, if appropriate.       TOTAL KNEE REPLACEMENT, AFTER-CARE GUIDELINES     These instructions provide you with information on caring for yourself and your knee after surgery. Your health care provider may also give you instructions that are more specific. Your treatment was planned and performed according to current medical practices but problems sometimes occur. Call your health care provider if you have any problems or questions.     WHAT TO EXPECT AFTER THE PROCEDURE   After your procedure, your knee will typically be stiff, sore, and bruised. This will improve over time.     Pain   Follow your home pain management plan as discussed with your nurse and as directed by your provider.   It is important to follow any scheduled pain medications for maximal pain relief.   If prescribed opioid medication, the goal is to use opioids only as needed and to wean off prescription pain medicine as soon as possible.   Ice can be used for pain control.   Put ice in a plastic bag.   Place a towel between your skin and the bag.   Leave the ice on for 20 minutes, 2-3 times a day at a minimum.   Most patients are off the pain pills by 3 weeks. If your pain continues to be severe, follow up with your provider.     Infection   Knee joint infections occur in fewer than 2% of patients. The most common causes of infection following total knee replacement surgery are from bacteria that enter the bloodstream during dental procedures, urinary tract infections, or skin infections. These bacteria can lodge around your knee replacement and cause an infection.    Keep the incision as clean and dry as possible.   Always wash your hands before touching your incision.   Avoid dental care for 3 months after surgery. Your provider may recommend taking a dose of antibiotics an hour prior to any dental procedure. After 2 years, most providers recommend antibiotics only before an extensive procedure. Ask your provider what they recommend.   Signs and symptoms of infection include low-grade fever, redness, pain, swelling and drainage from your incision. Notify your provider IMMEDIATELY if you develop ANY of these symptoms.     Post op Disturbances   Bowel Habits - Constipation is extremely common and caused by a combination of anesthesia, lack of mobility, dehydration and pain medicine. Use stool softeners or laxatives if necessary. It is important not to ignore this problem as bowel obstructions can be a serious complication after joint replacement surgery.   Mood/Energy Level - Many patients experience a lack of energy and endurance for up to 2-3 months after surgery. Some people feel down and can even become depressed. This is likely due to postoperative anemia, change in activity level, lack of sleep, pain medicine and just the emotional reaction to the surgery itself that is a big disruption in a person’s life. This usually passes. If symptoms persist, follow up with your primary care provider.  Returning to Work - Your provider will give you specific instructions based on your profession. Generally, if you work a sedentary job requiring little standing or walking, most patients may return within 2-6 weeks. Manual labor jobs involving walking, lifting and standing may take 3-4 months. Your provider’s office can provide a release to part-time or light duty work early on in your recovery and progress you to full duty as able.   Driving - You can begin driving once cleared by your provider, provided you are no longer taking narcotic pain medication or any other medications that  impair driving. Discuss the length of time expected with your provider as returning to driving depends on things such as your vehicle, which knee was replaced (right or left), and knee motion, strength and reflexes returning appropriately.   Avoiding falls - A fall during the first few weeks after surgery can damage your new knee and may result in a need for further surgery.  throw rugs and tack down loose carpeting. Be aware of floor hazards such as pets, small objects or uneven surfaces. Notify your provider of any falls.   Airport Metal Detectors - The sensitivity of metal detectors varies and it is likely that your prosthesis will cause an alarm. Inform the  of your artificial joint.     Diet   Resume your normal diet as tolerated.   It is important to achieve a healthy nutritional status by eating a well-balanced diet on a regular basis.   Your provider may recommend that you take iron and vitamin supplements.   Continue to drink plenty of fluids.     Shower/Bathing   You may shower as soon as you get home from the hospital unless otherwise instructed.   Keep your incision out of water to prevent infection. To keep the incision dry when showering, cover it with a plastic bag or plastic wrap. If your bandage is waterproof, this may not be necessary. o Pat incision dry if it gets wet. Do not rub. Notify your provider.   Do not submerge in a bath until cleared by your provider. Your staples must be out and the incision completely healed.     Dressing Change: Only change your dressing if directed by your provider.   Wash hands.   Open all dressing change materials.   Remove old dressing and discard.   Inspect incision for signs of irritation or infection including redness, increase in clear drainage, yellow/green drainage, odor and surrounding skin hot to touch. Notify your provider if present.    the new dressing by one corner and lay over the incision. Be careful not to touch the  inside of the dressing that will lay over the incision.   Secure in place as instructed. Swelling/Bruising   Swelling is normal after knee replacement and can involve the thigh, knee, calf and foot.   Swelling can last from 3-6 months.   To reduce swelling, elevate your leg higher than your heart while reclining. The first week you are home you should elevate your leg an equal amount of time as you are active.   The swelling is usually worse after you go home since you are upright for longer periods of time.   Bruising often does not appear until after you arrive home and can be quite dramatic- appearing purple, black, or green. Bruising is typically not concerning and will subside without any treatment.     Blood Clot Prevention   Your treatment plan includes multiple preventative measures to decrease the risk of blood clots in the legs (DVTs) and the less common, but serious, clots that travel to the lungs (pulmonary emboli). Most patients are at standard risk for them, but people who are at higher risk include those who have had previous clots, a family history of clotting, smoking, diabetes, obesity, advanced age, use estrogen and/or live a sedentary lifestyle.     Signs of blood clots in legs include - Swelling in thigh, calf or ankle that does not go down with elevation. Pain, heat and tenderness in calf, back of calf or groin area. NOTE: blood clots can occur in either leg.   Signs of blood clots in lungs include - Sudden increased shortness of breath, sudden onset of chest pain, and localized chest pain with coughing.   If you experience any of the above symptoms, notify your provider and seek medical attention immediately.   You received anticoagulant therapy (blood thinners) in the hospital. Continue the prescribed blood-thinning medication at home, as directed by your provider.   Your risk for developing a clot continues for up to 2-3 months after surgery. Avoid prolonged sitting and dehydration (long air  trips and car trips). If you do take a trip during this time, please get up, move around every 1-1.5 hours, and discuss all travel plans with your provider.     Activity   Once home, stay active. The key is not to overdo it. While you can expect some good days and some bad days, you should notice a gradual improvement and a gradual increase in your endurance over the next 6 to 12 months. Exercise is a critical component of recovery, particularly during the first few weeks after surgery.     Normal activities of daily living - Expect to resume most within 3 to 6 weeks following surgery. Some pain with activity and at night is common for several weeks after surgery. Walk as much as you like once your doctor gives permission to proceed, but remember that walking is no substitute for the exercises your doctor and physical therapist prescribe. Use a walker, crutches or cane to assist with walking until you can walk smoothly (minimal or no limp) without assistance.   Physical Therapy Exercises - Follow your home exercise program as instructed by your physical therapist during your hospital stay. Call and set up outpatient physical therapy appointments per your provider’s recommendations. Physical therapy after the hospital stay focuses on increasing your range of motion, strengthening your muscles and improving your gait/walking pattern. Contact your provider for the referral to outpatient physical therapy if you have not yet received this. ?   Riding a stationary bicycle can help maintain muscle tone and keep your knee flexible. Begin stationary bicycling as directed by your physical therapist or provider.   Sexual Activity - Your provider can tell you when it safe to resume sexual activity.   Sleeping Positions - You can safely sleep on your back, on either side, or on your stomach.   Other Activities - Lower impact activities are preferred. Consult your provider if you have specific questions.     When to Call the  Doctor   Call the provider if you experience:   Fever over 100.5° F   Increased pain, drainage, redness, odor or heat around the incision area   Shaking chills   Increased knee pain with activity and rest   Increased pain in calf, tenderness or redness above or below the knee   Increased swelling of calf, ankle, foot   Sudden increased shortness of breath, sudden onset of chest pain, localized chest pain with coughing   Incision opening   Or, if there are any questions or concerns about medications or care.     Infection statistic resource:   [email or web address]/prosthetic-joint-infection-epidemiology-microbiology-clinical-manifestations-and-diagnosis

## 2022-07-12 NOTE — THERAPY
Occupational Therapy   Initial Evaluation     Patient Name: Shaunna Vieira  Age:  66 y.o., Sex:  female  Medical Record #: [id number]  Today's Date: 7/12/2022     Precautions: No weightbearing restrictions L knee  Precautions: (P) Fall Risk  Comments: (P) low BP's    Assessment  Patient is 66 y.o. female s/p L TKA. A&Ox3. Motivated for activity; v/c's needed to maintain attn span. Sup to sit with CGA. EOB grooming with SBA. STS with CGA, FWW. LB dressing with ModA. Pt becomes lightheaded, nauseous. Low BP. Unable to perform ADL transfer at this time. BTB with CGA. RN in room. OT plans to see in am if stays overnight. Lives with brother, in 2nd story of house.             Plan  Recommend Occupational Therapy 2 times per week for 2 visits for the following treatments:  Neuro Re-Education / Balance, Self Care/Activities of Daily Living, and Therapeutic Activities.    DC Equipment Recommendations: (P) Unable to determine at this time, Front-Wheel Walker  Discharge Recommendations: (P) Anticipate that the patient will have no further occupational therapy needs after discharge from the hospital      07/12/22 1238   Prior Living Situation   Prior Services None   Housing / Facility 2 Story House   Steps Into Home 2   Steps In Home   (flight)   Bathroom Set up Walk In Shower   Equipment Owned Single Point Cane;Hand Held Shower;Reacher   Lives with - Patient's Self Care Capacity Other (Comments)   Comments lives with brother   Prior Level of ADL Function   Self Feeding Independent   Grooming / Hygiene Independent   Bathing Independent   Dressing Independent   Toileting Independent   Prior Level of IADL Function   Medication Management Independent   Laundry Independent   Kitchen Mobility Independent   Finances Unable To Determine At This Time   Home Management Independent   Shopping Unable To Determine At This Time   Prior Level Of Mobility Independent With Device in Home   Driving / Transportation Unable To Determine At  This Time   Occupation (Pre-Hospital Vocational) Unable To Determine At This Time   Leisure Interests Hobbies   Precautions   Precautions Fall Risk   Comments low BP's   Vitals   Pulse 80   Patient BP Position Supine   Blood Pressure  (!) 142/127  (RN Notified)   Respiration 16   Pulse Oximetry 94 %   O2 (LPM) 3   O2 Delivery Device Nasal Cannula   Pain 0 - 10 Group   Location Knee   Location Orientation Left   Therapist Pain Assessment Post Activity Pain Same as Prior to Activity;Nurse Notified   Cognition    Cognition / Consciousness WDL   Level of Consciousness Alert   Comments a bit anxious, v/c's to remain on task   Passive ROM Upper Body   Passive ROM Upper Body WDL   Active ROM Upper Body   Active ROM Upper Body  WDL   Dominant Hand Right   Strength Upper Body   Upper Body Strength  WDL   Sensation Upper Body   Upper Extremity Sensation  Not Tested   Upper Body Muscle Tone   Upper Body Muscle Tone  WDL   Coordination Upper Body   Coordination WDL   Balance Assessment   Sitting Balance (Static) Fair +   Sitting Balance (Dynamic) Fair   Standing Balance (Static) Fair   Standing Balance (Dynamic) Fair -   Weight Shift Sitting Fair   Weight Shift Standing Fair   Comments fww   Bed Mobility    Supine to Sit Contact Guard Assist   Sit to Supine Contact Guard Assist   Scooting Standby Assist   ADL Assessment   Eating Independent   Grooming Contact Guard Assist;Seated   Lower Body Dressing Moderate Assist   Toileting Maximal Assist   How much help from another person does the patient currently need...   Putting on and taking off regular lower body clothing? 2   Bathing (including washing, rinsing, and drying)? 3   Toileting, which includes using a toilet, bedpan, or urinal? 2   Putting on and taking off regular upper body clothing? 3   Taking care of personal grooming such as brushing teeth? 4   Eating meals? 4   6 Clicks Daily Activity Score 18   Functional Mobility   Sit to Stand Contact Guard Assist   Bed, Chair,  Wheelchair Transfer Minimal Assist   Comments low bp, pt unable to tolerate transfer or walk at  this time   Visual Perception   Visual Perception  WDL   Activity Tolerance   Sitting Edge of Bed 20   Standing 3   Patient / Family Goals   Patient / Family Goal #1 home   Short Term Goals   Short Term Goal # 1 pt will perform toileting in br with Sup within 5 days   Short Term Goal # 2 pt will perform grooming with sup within 5 days   Short Term Goal # 3 pt will perform FB dressing with Sup within 5 days   Education Group   Role of Occupational Therapist Patient Response Patient;Acceptance;Explanation;Verbal Demonstration   Problem List   Problem List Decreased Activity Tolerance;Impaired Postural Control / Balance;Safety Awareness Deficits / Cognition;Decreased Active Daily Living Skills   Anticipated Discharge Equipment and Recommendations   DC Equipment Recommendations Unable to determine at this time;Front-Wheel Walker   Discharge Recommendations Anticipate that the patient will have no further occupational therapy needs after discharge from the hospital   Interdisciplinary Plan of Care Collaboration   IDT Collaboration with  Nursing;Certified Nursing Assistant   Patient Position at End of Therapy In Bed;Call Light within Reach;Tray Table within Reach;Phone within Reach   Collaboration Comments Results. Pt with low bp's, feeling lightheaded/nauseous; may require another night's stay but will work with PT later on today.

## 2022-07-12 NOTE — OR NURSING
1039: Report received from Tanya LINCOLN.     1055: Patient tolerating water. Denies nausea.     1100: Patients brother updated.     1106: Report back to Tanya LINCOLN.

## 2022-07-12 NOTE — PROGRESS NOTES
"Pt arrived to unit w/out event. Pt calm, cooperative, pleasant affect talkative. Pt has ambulated to BR 15 ft x 2, voided 200ml. Rt is at bedside doing EDU on \"I.S.\" RN will reinforce t/o day. CNA has started post op VS, ice to left knee, left heel raised on pillow, EDU done on \"no pillow under knee\" instructions. SCDs on for VTE, EDU done on \"ankle pumps while at rest to promote circulation. Pt has tolerated oral fluids, no c/o n/v. RN reviewed POC which is for post op  rest / recovery / pain mgt PRN, work w/ therapy / discharge once criteria met. Fall and safety precautions in place, pt stated understanding to use call light for all ADL needs. Pt is independent w/ turns for skin integrity. Hourly rounds ongoing, call light w/in reach.   "

## 2022-07-12 NOTE — ANESTHESIA POSTPROCEDURE EVALUATION
Patient: Shaunan Vieira    Procedure Summary     Date: 07/12/22 Room / Location:  OR 06 / SURGERY [facility]    Anesthesia Start: 0833 Anesthesia Stop: 1006    Procedure: LEFT ARTHROPLASTY, KNEE, TOTAL (Left Knee) Diagnosis:       Osteoarthritis of left knee      (Osteoarthritis of left knee [M17.12])    Surgeons: Pollo Brooks M.D. Responsible Provider: Arun Nelsno M.D.    Anesthesia Type: general ASA Status: 3          Final Anesthesia Type: general  Last vitals  BP   Blood Pressure : (!) 80/34 (RN Notified)    Temp   36.7 °C (98 °F)    Pulse   79   Resp   18    SpO2   93 %      Anesthesia Post Evaluation    Patient location during evaluation: PACU  Patient participation: complete - patient participated  Level of consciousness: awake and alert  Pain score: 5    Airway patency: patent  Anesthetic complications: no  Cardiovascular status: hemodynamically stable  Respiratory status: acceptable  Hydration status: euvolemic    PONV: none          No complications documented.     Nurse Pain Score: 5 (NPRS)

## 2022-07-12 NOTE — OP REPORT
DIAGNOSIS: left knee osteoarthritis.    PROCEDURE: Total knee arthroplasty, left side.    ANESTHESIA: General.    COMPLICATIONS: None.    SURGEON: Pollo Brooks MD    ASSISTANT: Gasper Chávez    INDICATIONS: This is a patient with severe pain secondary to osteoarthritis having failed conservative treatments.    DESCRIPTION OF PROCEDURE: Patient was identified in the preop area, site was   marked, taken back to the operating room and underwent general anesthesia.   The left lower extremity was prepped and draped in sterile manner.   Preoperative timeout was held and antibiotics were given. Incision was made   over the anterior knee, soft tissue was dissected down to the fascia. The   fascia was split in medial parapatellar approach. The step drill was placed,   cut was made at 6 degrees and then a femoral cut guide was placed. All cuts   were made for the 3. The tibia was cut and sized to the 3. The   patella was cut and sized to a 29. All the trials were removed and then the   3 CR J2 S&N femur,  3 tibia and 29 patellar button were all cemented into  place. Final trialing showed that a 10 poly provided stability throughout   the entire arc of motion and 10 poly was placed. The wound was soaked with   dilute Betadine solution and was injected with Marcaine. Vicryl was used for   the fascia, Monocryl, soft tissue, skin and Dermabond for the final skin   layer. Patient was woken up, taken back to PACU, will be weightbear as   tolerated.

## 2022-07-12 NOTE — ANESTHESIA PREPROCEDURE EVALUATION
Case: [id number] Date/Time: 07/12/22 0745    Procedure: LEFT ARTHROPLASTY, KNEE, TOTAL    Diagnosis: Osteoarthritis of left knee [M17.12]    Pre-op diagnosis: Osteoarthritis of left knee [M17.12]    Location: [place] [address] / SURGERY [facility]    Surgeons: Pollo Brooks M.D.          Relevant Problems   CARDIAC   (positive) Elevated coronary artery calcium score   (positive) Spider veins   (positive) Venous insufficiency      GI   (positive) Gastroesophageal reflux disease       Physical Exam    Airway   Mallampati: II  TM distance: >3 FB  Neck ROM: full       Cardiovascular - normal exam  Rhythm: regular  Rate: normal  (-) murmur     Dental - normal exam           Pulmonary - normal exam  Breath sounds clear to auscultation     Abdominal    Neurological - normal exam                 Anesthesia Plan    ASA 3   ASA physical status 3 criteria: hypertension - poorly controlled    Plan - general               Induction: intravenous    Postoperative Plan: Postoperative administration of opioids is intended.    Pertinent diagnostic labs and testing reviewed    Informed Consent:    Anesthetic plan and risks discussed with patient.    Use of blood products discussed with: patient whom consented to blood products.

## 2022-07-12 NOTE — OR NURSING
1106 REPORT RECEIVED FROM NAEL LINCOLN    1116 WEAN TO RA. VSS.      1135 XRAY AT BEDSIDE.     1137 O2 SAT DROPS TO 74%. PT PLACED ON 3L N/C FOR O2 SAT 97%.     1140 XRAY COMPLETE.    1200 PT MEETS CRITERIA FOR TRANSFER TO ROOM.

## 2022-07-12 NOTE — ANESTHESIA TIME REPORT
Anesthesia Start and Stop Event Times     Date Time Event    7/12/2022 0811 Ready for Procedure     0833 Anesthesia Start     1006 Anesthesia Stop        Responsible Staff  07/12/22    Name Role Begin End    Arun Nelson M.D. Anesth 0833 1006        Overtime Reason:  no overtime (within assigned shift)    Comments:

## 2022-07-13 VITALS
WEIGHT: 187.83 LBS | HEIGHT: 62 IN | HEART RATE: 72 BPM | OXYGEN SATURATION: 93 % | TEMPERATURE: 98.4 F | BODY MASS INDEX: 34.57 KG/M2 | DIASTOLIC BLOOD PRESSURE: 58 MMHG | RESPIRATION RATE: 18 BRPM | SYSTOLIC BLOOD PRESSURE: 105 MMHG

## 2022-07-13 PROCEDURE — 97112 NEUROMUSCULAR REEDUCATION: CPT

## 2022-07-13 PROCEDURE — A9270 NON-COVERED ITEM OR SERVICE: HCPCS | Performed by: STUDENT IN AN ORGANIZED HEALTH CARE EDUCATION/TRAINING PROGRAM

## 2022-07-13 PROCEDURE — A9270 NON-COVERED ITEM OR SERVICE: HCPCS | Performed by: PHYSICIAN ASSISTANT

## 2022-07-13 PROCEDURE — 97116 GAIT TRAINING THERAPY: CPT

## 2022-07-13 PROCEDURE — 96376 TX/PRO/DX INJ SAME DRUG ADON: CPT

## 2022-07-13 PROCEDURE — 700102 HCHG RX REV CODE 250 W/ 637 OVERRIDE(OP): Performed by: STUDENT IN AN ORGANIZED HEALTH CARE EDUCATION/TRAINING PROGRAM

## 2022-07-13 PROCEDURE — G0378 HOSPITAL OBSERVATION PER HR: HCPCS

## 2022-07-13 PROCEDURE — 97535 SELF CARE MNGMENT TRAINING: CPT

## 2022-07-13 PROCEDURE — 700102 HCHG RX REV CODE 250 W/ 637 OVERRIDE(OP): Performed by: PHYSICIAN ASSISTANT

## 2022-07-13 PROCEDURE — 96375 TX/PRO/DX INJ NEW DRUG ADDON: CPT

## 2022-07-13 PROCEDURE — 700111 HCHG RX REV CODE 636 W/ 250 OVERRIDE (IP): Performed by: STUDENT IN AN ORGANIZED HEALTH CARE EDUCATION/TRAINING PROGRAM

## 2022-07-13 PROCEDURE — 97110 THERAPEUTIC EXERCISES: CPT

## 2022-07-13 RX ADMIN — DOCUSATE SODIUM 100 MG: 100 CAPSULE, LIQUID FILLED ORAL at 04:21

## 2022-07-13 RX ADMIN — ACETAMINOPHEN 1000 MG: 500 TABLET ORAL at 04:20

## 2022-07-13 RX ADMIN — DEXAMETHASONE SODIUM PHOSPHATE 4 MG: 4 INJECTION, SOLUTION INTRA-ARTICULAR; INTRALESIONAL; INTRAMUSCULAR; INTRAVENOUS; SOFT TISSUE at 04:21

## 2022-07-13 RX ADMIN — ACETAMINOPHEN 1000 MG: 500 TABLET ORAL at 00:13

## 2022-07-13 RX ADMIN — BENZOCAINE AND MENTHOL 1 LOZENGE: 15; 3.6 LOZENGE ORAL at 00:12

## 2022-07-13 RX ADMIN — KETOROLAC TROMETHAMINE 15 MG: 30 INJECTION, SOLUTION INTRAMUSCULAR; INTRAVENOUS at 00:13

## 2022-07-13 RX ADMIN — BENZOCAINE AND MENTHOL 1 LOZENGE: 15; 3.6 LOZENGE ORAL at 04:20

## 2022-07-13 RX ADMIN — KETOROLAC TROMETHAMINE 15 MG: 30 INJECTION, SOLUTION INTRAMUSCULAR; INTRAVENOUS at 04:21

## 2022-07-13 RX ADMIN — BENZOCAINE AND MENTHOL 1 LOZENGE: 15; 3.6 LOZENGE ORAL at 10:34

## 2022-07-13 RX ADMIN — ASPIRIN 81 MG: 81 TABLET, COATED ORAL at 09:35

## 2022-07-13 RX ADMIN — ACETAMINOPHEN 1000 MG: 500 TABLET ORAL at 12:21

## 2022-07-13 ASSESSMENT — COGNITIVE AND FUNCTIONAL STATUS - GENERAL
WALKING IN HOSPITAL ROOM: A LITTLE
STANDING UP FROM CHAIR USING ARMS: A LITTLE
MOBILITY SCORE: 19
CLIMB 3 TO 5 STEPS WITH RAILING: A LITTLE
MOVING TO AND FROM BED TO CHAIR: A LITTLE
SUGGESTED CMS G CODE MODIFIER MOBILITY: CK
MOVING FROM LYING ON BACK TO SITTING ON SIDE OF FLAT BED: A LITTLE

## 2022-07-13 ASSESSMENT — GAIT ASSESSMENTS
ASSISTIVE DEVICE: FRONT WHEEL WALKER
DEVIATION: ANTALGIC
GAIT LEVEL OF ASSIST: SUPERVISED
DISTANCE (FEET): 100

## 2022-07-13 ASSESSMENT — PATIENT HEALTH QUESTIONNAIRE - PHQ9
1. LITTLE INTEREST OR PLEASURE IN DOING THINGS: NOT AT ALL
SUM OF ALL RESPONSES TO PHQ9 QUESTIONS 1 AND 2: 0
2. FEELING DOWN, DEPRESSED, IRRITABLE, OR HOPELESS: NOT AT ALL

## 2022-07-13 NOTE — DISCHARGE PLANNING
Case Management Discharge Planning    Admission Date: 7/12/2022  GMLOS:    ALOS: 0    6-Clicks ADL Score: 20  6-Clicks Mobility Score: 19      Anticipated Discharge Dispo: Discharge Disposition: Discharged to home/self care (01)    DME Needed: Yes    DME Ordered: Yes    Action(s) Taken: Choice form completed for FWW from [facility] continuity of care and faxed to NEHA Muñoz RN to obtain FWW and deliver to pt's bedside.    Escalations Completed: None    Medically Clear: No    Next Steps: Plan to DC home today with FWW when medically cleared    Barriers to Discharge: Medical clearance    Is the patient up for discharge tomorrow: No, today

## 2022-07-13 NOTE — CARE PLAN
The patient is Stable - Low risk of patient condition declining or worsening    Shift Goals  Clinical Goals: Pain <4\10  Patient Goals: rest and comfort    Progress made toward(s) clinical / shift goals:    Medication given per scale    Problem: Pain - Standard  Goal: Alleviation of pain or a reduction in pain to the patient’s comfort goal  Outcome: Progressing     Problem: Knowledge Deficit - Standard  Goal: Patient and family/care givers will demonstrate understanding of plan of care, disease process/condition, diagnostic tests and medications  Outcome: Progressing       Patient is not progressing towards the following goals:

## 2022-07-13 NOTE — THERAPY
Occupational Therapy  Daily Treatment     Patient Name: Shaunna Vieira  Age:  66 y.o., Sex:  female  Medical Record #: [id number]  Today's Date: 7/13/2022     Precautions  Precautions: Fall Risk  Comments: low BP's    Assessment  Pt shows improved balance, activity tolerance, safety during ALD's today and is safe for home. Performs ADL's with Spv. Performs functional mobiltiy using FWW with Spv/ Mod Indep. Good balance. Brother in room; pt and brother have no further questions. DC ready from OT.      Plan  Patient discharged from facility.    DC Equipment Recommendations: (P) None  Discharge Recommendations: (P) Anticipate that the patient will have no further occupational therapy needs after discharge from the hospital     07/13/22 1255   Pain 0 - 10 Group   Location Knee   Location Orientation Left   Therapist Pain Assessment Post Activity Pain Same as Prior to Activity;Nurse Notified;1   Cognition    Cognition / Consciousness WDL   Level of Consciousness Alert   Balance   Sitting Balance (Static) Good   Sitting Balance (Dynamic) Good   Standing Balance (Static) Good   Standing Balance (Dynamic) Fair +   Weight Shift Sitting Good   Weight Shift Standing Good   Skilled Intervention Verbal Cuing   Bed Mobility    Supine to Sit Independent   Sit to Supine Independent   Scooting Independent   Activities of Daily Living   Eating Independent   Grooming Independent   Upper Body Dressing Independent   Lower Body Dressing Supervision   Toileting Modified Independent   Skilled Intervention Compensatory Strategies   Functional Mobility   Sit to Stand Modified Independent   Bed, Chair, Wheelchair Transfer Supervised   Toilet Transfers Supervised   Transfer Method Stand Step   Activity Tolerance   Sitting in Chair 4   Sitting Edge of Bed 17   Standing 5   Short Term Goals   Goal Outcome # 1 Goal met   Goal Outcome # 2 Goal met   Goal Outcome # 3 Goal met   Education Group   Education Provided Home Safety   Role of  Occupational Therapist Patient Response Patient;Acceptance;Explanation;Verbal Demonstration   Home Safety Patient Response Patient;Family;Acceptance;Explanation;Verbal Demonstration   Anticipated Discharge Equipment and Recommendations   DC Equipment Recommendations None   Discharge Recommendations Anticipate that the patient will have no further occupational therapy needs after discharge from the hospital   Interdisciplinary Plan of Care Collaboration   IDT Collaboration with  Nursing;Family / Caregiver   Patient Position at End of Therapy Edge of Bed;Family / Friend in Room;Tray Table within Reach;Call Light within Reach   Collaboration Comments DC ready from OT

## 2022-07-13 NOTE — PROGRESS NOTES
Pt sitting upright in bed, verbalizes needs well, demonstrates use of call bell. Pedal pulse strong, cap refill less than 3 seconds. Call bell in reach. No signs of distress, or pain complaints.    Chart check completed

## 2022-07-13 NOTE — CARE PLAN
The patient is Stable - Low risk of patient condition declining or worsening    Shift Goals  Clinical Goals: Ambulate 50 ft and void by 1900  Patient Goals: PT/OT eval    Progress made toward(s) clinical / shift goals:  Pt voided and ambulated over 50 feet.   Problem: Pain - Standard  Goal: Alleviation of pain or a reduction in pain to the patient’s comfort goal  Outcome: Progressing     Problem: Knowledge Deficit - Standard  Goal: Patient and family/care givers will demonstrate understanding of plan of care, disease process/condition, diagnostic tests and medications  Outcome: Progressing       Patient is not progressing towards the following goals:Pt not able to complete PT and OT eval due to becoming lightheaded and having symptoms of low blood pressure. PT able to recover with rest. Evaluations will be completed in the morning.

## 2022-07-13 NOTE — THERAPY
Physical Therapy   Daily Treatment     Patient Name: Shaunna Vieira  Age:  66 y.o., Sex:  female  Medical Record #: [id number]  Today's Date: 7/13/2022     Precautions  Precautions: Fall Risk  Comments: low BP's    Assessment    Pt seen for PT session with focus on HEP, pain control education,  use of ice, walker height adjustment, stairs and gait training. Pt given handout. Pt demonstrating 0-90 degrees ROM. Pt cleared for home with OP PT services.     Plan    Patient discharged from facility.    DC Equipment Recommendations: Front-Wheel Walker  Discharge Recommendations: Recommend outpatient physical therapy services to address higher level deficits           Objective       07/13/22 1200   Balance   Sitting Balance (Static) Good   Sitting Balance (Dynamic) Good   Standing Balance (Static) Good   Standing Balance (Dynamic) Fair +   Weight Shift Sitting Good   Weight Shift Standing Good   Comments w/FWW   Gait Analysis   Gait Level Of Assist Supervised   Assistive Device Front Wheel Walker   Distance (Feet) 100   # of Times Distance was Traveled 1   Deviation Antalgic  (good step symmetry. able to maintain WB in stance.)   # of Stairs Climbed 8   Level of Assist with Stairs Modified Independent   Weight Bearing Status no restrictions.   Comments given verbal and written instruction on exercises, sequencing of stair   Bed Mobility    Supine to Sit Independent   Sit to Supine Independent   Scooting Independent   Rolling Modified Independent   Functional Mobility   Sit to Stand Modified Independent   Transfer Method Stand Step   Short Term Goals    Short Term Goal # 1 pt will go sit<>stand w/fww w/spv in 6tx for safe d/c home   Goal Outcome # 1 Goal met   Short Term Goal # 2 pt will ambulate for 150ft w/fww w/spv in 6tx for safe d/c home   Goal Outcome # 2 Progressing as expected   Short Term Goal # 3 pt will go up/down 20 steps with B railing use w/spv in 6tx for safe d/c home   Goal Outcome # 3 Progressing  slower than expected   Education Group   Role of Physical Therapist Patient Response Patient;Acceptance;Explanation;Verbal Demonstration   Gait Training Patient Response Patient;Acceptance;Explanation;Action Demonstration   Stair Training Patient Response Patient;Acceptance;Explanation;Handout;Action Demonstration

## 2022-07-13 NOTE — THERAPY
Physical Therapy   Initial Evaluation     Patient Name: Shaunna Vieira  Age:  66 y.o., Sex:  female  Medical Record #: [id number]  Today's Date: 7/12/2022     Precautions  Precautions: (P) Fall Risk;No Weight Bearing Restrictions Left Lower Extremity  Comments: (P) low BP's    Assessment  Patient is 66 y.o. female who was seen s/p L TKA. Pt was agreeable to therapy evaluation after having completed OT eval. Per OT and RN pt has been having low BP and was symptomatic initially, however, during second ambulation attempt with RN pt had improved in symptoms. Pt presented to PT with impaired balance, impaired gait, pain, and dec activity tolerance during session. During ambulation pt was able to demonstrate safe gait mechanics, however, during stair assessment pt reported of increased lightheadedness and dizziness. Pt was assisted back to bed and was assisted in supine due to worsening symptoms. Pt demonstrated with BP of 82/53 and SpO2 of 85%. With deep breathing pt was able recover SpO2 back to 91%. RN aware of concerns. Pt was assisted into transport chair and assisted back to room and back to supine in bed with CGA. Will plan on seeing pt in AM for safe d/c planning needs. Recommend outpatient physical therapy services to address higher level deficits.    Plan    Recommend Physical Therapy daily until therapy goals are met for the following treatments:  Bed Mobility, Community Re-integration, Equipment, Gait Training, Manual Therapy, Neuro Re-Education / Balance, Self Care/Home Evaluation, Stair Training, Therapeutic Activities, and Therapeutic Exercises    DC Equipment Recommendations: (P) Front-Wheel Walker  Discharge Recommendations: (P) Recommend outpatient physical therapy services to address higher level deficits     Objective       07/12/22 1430   Initial Contact Note    Initial Contact Note Order Received and Verified, Physical Therapy Evaluation in Progress with Full Report to Follow.   Precautions    Precautions Fall Risk;No Weight Bearing Restrictions Left Lower Extremity   Comments low BP's   Pain 0 - 10 Group   Location Knee   Location Orientation Left   Description Aching   Therapist Pain Assessment Prior to Activity;During Activity;Post Activity;Nurse Notified;3   Prior Living Situation   Prior Services None   Housing / Facility 2 Story House   Steps Into Home 2   Steps In Home 20   Rail Both Rail (Steps in Home)   Equipment Owned None   Lives with - Patient's Self Care Capacity Other (Comments)  (brother)   Prior Level of Functional Mobility   Bed Mobility Independent   Transfer Status Independent   Ambulation Independent   Distance Ambulation (Feet)   (community)   Assistive Devices Used None   Stairs Independent   History of Falls   History of Falls No   Cognition    Cognition / Consciousness X   Level of Consciousness Alert   Safety Awareness Impaired   Attention Impaired   Comments tangential at times; poor attention to tasks   Passive ROM Lower Body   Passive ROM Lower Body X   Comments limited due to pain; NT   Active ROM Lower Body    Active ROM Lower Body  X   Comments limited due to pain; NT   Strength Lower Body   Lower Body Strength  X   Comments limited due to pain; able to demonstrate functional strength   Sensation Lower Body   Lower Extremity Sensation   WDL   Lower Body Muscle Tone   Lower Body Muscle Tone  WDL   Strength Upper Body   Upper Body Strength  WDL   Upper Body Muscle Tone   Upper Body Muscle Tone  WDL   Neurological Concerns   Neurological Concerns No   Coordination Upper Body   Coordination WDL   Coordination Lower Body    Coordination Lower Body  WDL   Balance Assessment   Sitting Balance (Static) Fair +   Sitting Balance (Dynamic) Fair   Standing Balance (Static) Fair   Standing Balance (Dynamic) Fair   Weight Shift Sitting Fair   Weight Shift Standing Fair   Comments w/fww   Gait Analysis   Gait Level Of Assist Contact Guard Assist   Assistive Device Front Wheel Walker    Distance (Feet) 100   # of Times Distance was Traveled 1   Deviation Antalgic;Step To   # of Stairs Climbed 10   Level of Assist with Stairs Contact Guard Assist   Weight Bearing Status none noted   Bed Mobility    Supine to Sit Contact Guard Assist   Sit to Supine Contact Guard Assist   Scooting Standby Assist   Comments HOB elevated and rails up   Functional Mobility   Sit to Stand Contact Guard Assist   Bed, Chair, Wheelchair Transfer Contact Guard Assist   Transfer Method Stand Step   Mobility EOB, sit<>stand, ambulation, stairs, back to bed in transport chair   How much difficulty does the patient currently have...   Turning over in bed (including adjusting bedclothes, sheets and blankets)? 4   Sitting down on and standing up from a chair with arms (e.g., wheelchair, bedside commode, etc.) 3   Moving from lying on back to sitting on the side of the bed? 3   How much help from another person does the patient currently need...   Moving to and from a bed to a chair (including a wheelchair)? 3   Need to walk in a hospital room? 3   Climbing 3-5 steps with a railing? 3   6 clicks Mobility Score 19   Activity Tolerance   Sitting in Chair 5 mins   Sitting Edge of Bed 5 mins   Standing 10 mins   Comments limited due to lightheadedness and dizziness   Edema / Skin Assessment   Edema / Skin  Not Assessed   Patient / Family Goals    Patient / Family Goal #1 to go home   Short Term Goals    Short Term Goal # 1 pt will go sit<>stand w/fww w/spv in 6tx for safe d/c home   Short Term Goal # 2 pt will ambulate for 150ft w/fww w/spv in 6tx for safe d/c home   Short Term Goal # 3 pt will go up/down 20 steps with B railing use w/spv in 6tx for safe d/c home   Education Group   Education Provided Role of Physical Therapist;Gait Training;Stair Training   Role of Physical Therapist Patient Response Patient;Acceptance;Explanation;Demonstration;Action Demonstration;Verbal Demonstration   Gait Training Patient Response  Patient;Acceptance;Explanation;Demonstration;Verbal Demonstration;Action Demonstration   Stair Training Patient Response Patient;Acceptance;Explanation;Demonstration;Verbal Demonstration;Action Demonstration;Reinforcement Needed   Problem List    Problems Pain;Impaired Transfers;Impaired Bed Mobility;Impaired Ambulation;Impaired Balance;Decreased Activity Tolerance   Anticipated Discharge Equipment and Recommendations   DC Equipment Recommendations Front-Wheel Walker   Discharge Recommendations Recommend outpatient physical therapy services to address higher level deficits   Interdisciplinary Plan of Care Collaboration   IDT Collaboration with  Nursing   Patient Position at End of Therapy In Bed;Call Light within Reach;Tray Table within Reach;Phone within Reach   Collaboration Comments aware of visit and recs   Session Information   Date / Session Number  7/12-1 (1/7, 7/18)   Priority 4

## 2022-07-13 NOTE — DISCHARGE PLANNING
Received Choice form at 2269  Agency/Facility Name: [facility]  Referral sent per Choice form @ 2218

## 2022-07-18 NOTE — PROGRESS NOTES
Shaunna Vieira was admitted on 7/12/2022 for Osteoarthritis of left knee, unspecified osteoarthritis type [M17.12]  Patient was diagnosed with the above and underwent a left knee arthroplasty by Dr. Brooks on the date of admission. Please see dictated operative note for further information.    Hospital course:     The patient has done well, with no complications.  Patient denies chest pain, calf pain or shortness of breath.   Pain is well-controlled at present.  Patient is ambulating well with the use of an assistive device, and progressing in physical therapy.   Patient is neurologically and vascularly intact with palpable pedal pulses bilaterally.   Narcotic dosages were chosen by taking into account the the patient's previous history of opoid use and to ensure proper pain control after surgery    Discharge date: 7/14/22    Patient is being discharged to Home after am physical therapy.     Allergies:  Morphine, Nystatin, Prilosec [omeprazole], Sulfa drugs, Pravastatin, Bio-35 gluten-free [actical], Cobalt, and Lanolin       Medication List      START taking these medications      Instructions   aspirin 81 MG EC tablet  Commonly known as: Aspirin 81   Take 1 Tablet by mouth 2 (two) times a day for 30 days.  Dose: 81 mg     oxyCODONE immediate-release 5 MG Tabs  Commonly known as: ROXICODONE   Take 1 Tablet by mouth every four hours as needed for Severe Pain for up to 7 days.  Dose: 5 mg     traMADol 50 MG Tabs  Commonly known as: ULTRAM   Take 1 Tablet by mouth every four hours as needed for Moderate Pain for up to 10 days.  Dose: 50 mg        CONTINUE taking these medications      Instructions   atorvastatin 10 MG Tabs  Commonly known as: LIPITOR   Take 1 Tablet by mouth every evening.  Dose: 10 mg     clindamycin 1 % Soln  Commonly known as: CLEOCIN   Apply 1 Application topically 2 times a day. Apply 1-2 times daily to affected areas  Dose: 1 Application     fluticasone 50 MCG/ACT nasal spray  Commonly  known as: FLONASE   USE 2 SPRAYS IN EACH NOSTRIL DAILY AS NEEDED     GLUCOSAMINE CHONDR 1500 COMPLX PO   Take  by mouth every day.     ketoconazole 2 % Crea  Commonly known as: NIZORAL   Apply 1 Application topically 2 times a day as needed (rash).  Dose: 1 Application     Levocetirizine Dihydrochloride 5 MG Tabs   Take  by mouth every day.     meloxicam 7.5 MG Tabs  Commonly known as: MOBIC   Take 1 Tablet by mouth 1 time a day as needed for Moderate Pain or Inflammation.  Dose: 7.5 mg     MULTI-VITAMIN DAILY PO   Take  by mouth.     mupirocin 2 % Oint  Commonly known as: BACTROBAN   Apply  to affected area(s) as needed.     Proctosol HC 2.5% Crea  Generic drug: hydrocortisone rectal   APPLY 1 PEASIZED AMOUNT TOPICALLY TO AFFECTED AREA TWICE DAILY FOR TWO WEEKS     triamcinolone acetonide 0.1 % Crea  Commonly known as: KENALOG   Apply  to affected area(s) as needed.            Discharge Instructions:     Patient is instructed to ambulate and weight bear as tolerated with the use of an assistive device, and to continue physical therapy exercises given during this hospital stay. Strict posterior hip precautions are to be observed for patients who underwent a total hip replacement.   Patient is to ice and elevate the surgical leg regularly, with pillows under the ankle, nothing is to be placed under the knee.   Patient was given detailed wound care instructions, and will leave the Incisional vac or silver dressing on until first post-op visit.   Aspirin 81mg twice daily for DVT prophylaxis.  Patient is to follow up with Dr. Strange's office in 2 weeks.

## 2022-07-27 DIAGNOSIS — M79.604 PAIN IN BOTH LOWER EXTREMITIES: ICD-10-CM

## 2022-07-27 DIAGNOSIS — M79.605 PAIN IN BOTH LOWER EXTREMITIES: ICD-10-CM

## 2022-07-27 RX ORDER — MELOXICAM 7.5 MG/1
7.5 TABLET ORAL
Qty: 30 TABLET | Refills: 2 | Status: SHIPPED | OUTPATIENT
Start: 2022-07-27 | End: 2022-08-08 | Stop reason: SDUPTHER

## 2022-08-08 ENCOUNTER — TELEPHONE (OUTPATIENT)
Dept: MEDICAL GROUP | Facility: MEDICAL CENTER | Age: 67
End: 2022-08-08
Payer: COMMERCIAL

## 2022-08-08 DIAGNOSIS — M79.604 PAIN IN BOTH LOWER EXTREMITIES: ICD-10-CM

## 2022-08-08 DIAGNOSIS — M79.605 PAIN IN BOTH LOWER EXTREMITIES: ICD-10-CM

## 2022-08-08 RX ORDER — MELOXICAM 7.5 MG/1
7.5 TABLET ORAL
Qty: 90 TABLET | Refills: 1 | Status: SHIPPED | OUTPATIENT
Start: 2022-08-08 | End: 2023-12-13

## 2022-09-02 DIAGNOSIS — L73.2 HIDRADENITIS SUPPURATIVA: ICD-10-CM

## 2022-09-07 RX ORDER — CLINDAMYCIN PHOSPHATE 11.9 MG/ML
1 SOLUTION TOPICAL 2 TIMES DAILY
Qty: 60 ML | Refills: 3 | Status: SHIPPED | OUTPATIENT
Start: 2022-09-07 | End: 2023-09-11

## 2022-10-19 ENCOUNTER — OFFICE VISIT (OUTPATIENT)
Dept: MEDICAL GROUP | Facility: MEDICAL CENTER | Age: 67
End: 2022-10-19
Payer: COMMERCIAL

## 2022-10-19 VITALS
DIASTOLIC BLOOD PRESSURE: 80 MMHG | OXYGEN SATURATION: 96 % | HEART RATE: 77 BPM | BODY MASS INDEX: 35.33 KG/M2 | TEMPERATURE: 97.1 F | HEIGHT: 62 IN | WEIGHT: 192 LBS | SYSTOLIC BLOOD PRESSURE: 120 MMHG

## 2022-10-19 DIAGNOSIS — R73.03 PREDIABETES: ICD-10-CM

## 2022-10-19 DIAGNOSIS — E78.00 PURE HYPERCHOLESTEROLEMIA: ICD-10-CM

## 2022-10-19 DIAGNOSIS — E55.9 VITAMIN D DEFICIENCY: ICD-10-CM

## 2022-10-19 DIAGNOSIS — M17.12 PRIMARY OSTEOARTHRITIS OF LEFT KNEE: ICD-10-CM

## 2022-10-19 DIAGNOSIS — Z00.00 ANNUAL PHYSICAL EXAM: ICD-10-CM

## 2022-10-19 PROCEDURE — 99214 OFFICE O/P EST MOD 30 MIN: CPT | Performed by: NURSE PRACTITIONER

## 2022-10-19 ASSESSMENT — FIBROSIS 4 INDEX: FIB4 SCORE: 1.07

## 2022-10-19 NOTE — ASSESSMENT & PLAN NOTE
S/P TKA July 2022. She has a small scabbed area that has still not healed, sometimes is red and inflammed after significant activity, then will go away on its own. Not painful. No fevers, chills, body aches.

## 2022-12-13 NOTE — PROGRESS NOTES
Subjective:   Shaunna Vieira is a 67 y.o. female here today for knee pain,     Osteoarthritis of left knee  S/P TKA July 2022. She has a small scabbed area that has still not healed, sometimes is red and inflammed after significant activity, then will go away on its own. Not painful. No fevers, chills, body aches.        Current medicines (including changes today)  Current Outpatient Medications   Medication Sig Dispense Refill    atorvastatin (LIPITOR) 10 MG Tab TAKE 1 TABLET EVERY EVENING 90 Tablet 0    clindamycin (CLEOCIN) 1 % Solution Apply 1 Application topically 2 times a day. Apply 1-2 times daily to affected areas 60 mL 3    meloxicam (MOBIC) 7.5 MG Tab Take 1 Tablet by mouth 1 time a day as needed for Moderate Pain or Inflammation. 90 Tablet 1    Levocetirizine Dihydrochloride 5 MG Tab Take  by mouth every day.      ketoconazole (NIZORAL) 2 % Cream Apply 1 Application topically 2 times a day as needed (rash). 30 g 3    PROCTOSOL HC 2.5 % Cream APPLY 1 PEASIZED AMOUNT TOPICALLY TO AFFECTED AREA TWICE DAILY FOR TWO WEEKS      triamcinolone acetonide (KENALOG) 0.1 % Cream Apply  to affected area(s) as needed.      fluticasone (FLONASE) 50 MCG/ACT nasal spray USE 2 SPRAYS IN EACH NOSTRIL DAILY AS NEEDED 16 g 3    mupirocin (BACTROBAN) 2 % OINT Apply  to affected area(s) as needed.      Glucosamine-Chondroit-Vit C-Mn (GLUCOSAMINE CHONDR 1500 COMPLX PO) Take  by mouth every day.      Multiple Vitamin (MULTI-VITAMIN DAILY PO) Take  by mouth.       No current facility-administered medications for this visit.     She  has a past medical history of Abnormal uterine bleeding, Abscess, Acne, Adverse effect of anesthesia, Anesthesia, Breast mass, right, Colon polyps, Dermatitis, Elevated random blood glucose level, Elevated transaminase level, Environmental and seasonal allergies, GERD (gastroesophageal reflux disease), Hyperlipidemia, Migraine, Obesity, Osteoarthritis, Pain (04/19/2018), Snoring, Tinnitus,  "and Vitamin D deficiency.    ROS   No chest pain, no shortness of breath, no abdominal pain  Positive ROS as per HPI.  All other systems reviewed and are negative.     Objective:     /80 (BP Location: Right arm, Patient Position: Sitting, BP Cuff Size: Adult)   Pulse 77   Temp 36.2 °C (97.1 °F) (Temporal)   Ht 1.575 m (5' 2\")   Wt 87.1 kg (192 lb)   SpO2 96%  Body mass index is 35.12 kg/m².     Physical Exam:  Constitutional: Alert, no distress.  Skin: Warm, dry, good turgor, no rashes in visible areas.  Eye: Equal, round and reactive, conjunctiva clear, lids normal.  ENMT:Mask in place  Respiratory: Unlabored respiratory effort  Psych: Alert and oriented x3, normal affect and mood.        Assessment and Plan:   The following treatment plan was discussed    1. Primary osteoarthritis of left knee  Unstable  Doesn't appear infected  Continue to monitor area for s/s infection, report ASAP    2. Annual physical exam  Due for annual labs  - CBC WITH DIFFERENTIAL; Future  - Comp Metabolic Panel; Future  - HEMOGLOBIN A1C; Future  - MICROALBUMIN CREAT RATIO URINE; Future  - TSH WITH REFLEX TO FT4; Future  - VITAMIN D,25 HYDROXY (DEFICIENCY); Future  - Lipid Profile; Future    3. Vitamin D deficiency  - VITAMIN D,25 HYDROXY (DEFICIENCY); Future    4. Prediabetes  - HEMOGLOBIN A1C; Future    5. Pure hypercholesterolemia  - Lipid Profile; Future      Followup: Return for Annual, Lab Review.    The MA is performing the above orders under the direction of Dr. Lizama    Please note that this dictation was created using voice recognition software. I have made every reasonable attempt to correct obvious errors, but I expect that there are errors of grammar and possibly content that I did not discover before finalizing the note.               "

## 2022-12-25 DIAGNOSIS — E78.00 PURE HYPERCHOLESTEROLEMIA: ICD-10-CM

## 2022-12-29 RX ORDER — ATORVASTATIN CALCIUM 10 MG/1
TABLET, FILM COATED ORAL
Qty: 90 TABLET | Refills: 0 | Status: SHIPPED | OUTPATIENT
Start: 2022-12-29 | End: 2023-03-30

## 2023-03-28 DIAGNOSIS — E78.00 PURE HYPERCHOLESTEROLEMIA: ICD-10-CM

## 2023-03-29 NOTE — TELEPHONE ENCOUNTER
Received request via: Pharmacy    Was the patient seen in the last year in this department? Yes  10/19/2022  Does the patient have an active prescription (recently filled or refills available) for medication(s) requested? No    Does the patient have [facility]PC Plus and need 100 day supply (blood pressure, diabetes and cholesterol meds only)? Patient does not have SCP

## 2023-03-30 RX ORDER — ATORVASTATIN CALCIUM 10 MG/1
TABLET, FILM COATED ORAL
Qty: 90 TABLET | Refills: 3 | Status: SHIPPED | OUTPATIENT
Start: 2023-03-30 | End: 2024-03-26

## 2023-07-07 ENCOUNTER — APPOINTMENT (OUTPATIENT)
Dept: RADIOLOGY | Facility: MEDICAL CENTER | Age: 68
End: 2023-07-07
Attending: NURSE PRACTITIONER
Payer: COMMERCIAL

## 2023-07-07 ENCOUNTER — OFFICE VISIT (OUTPATIENT)
Dept: MEDICAL GROUP | Facility: MEDICAL CENTER | Age: 68
End: 2023-07-07
Payer: COMMERCIAL

## 2023-07-07 VITALS
TEMPERATURE: 96.8 F | OXYGEN SATURATION: 96 % | DIASTOLIC BLOOD PRESSURE: 68 MMHG | SYSTOLIC BLOOD PRESSURE: 110 MMHG | HEART RATE: 70 BPM

## 2023-07-07 DIAGNOSIS — M25.562 CHRONIC PAIN OF LEFT KNEE: ICD-10-CM

## 2023-07-07 DIAGNOSIS — G89.29 CHRONIC PAIN OF LEFT KNEE: ICD-10-CM

## 2023-07-07 DIAGNOSIS — M79.662 PAIN IN LEFT SHIN: ICD-10-CM

## 2023-07-07 PROCEDURE — 73590 X-RAY EXAM OF LOWER LEG: CPT | Mod: LT

## 2023-07-07 PROCEDURE — 73562 X-RAY EXAM OF KNEE 3: CPT | Mod: LT

## 2023-07-07 PROCEDURE — 3078F DIAST BP <80 MM HG: CPT | Performed by: NURSE PRACTITIONER

## 2023-07-07 PROCEDURE — 3074F SYST BP LT 130 MM HG: CPT | Performed by: NURSE PRACTITIONER

## 2023-07-07 PROCEDURE — 99214 OFFICE O/P EST MOD 30 MIN: CPT | Performed by: NURSE PRACTITIONER

## 2023-07-07 NOTE — PROGRESS NOTES
Subjective:   Shaunna Vieira is a 67 y.o. female here today for knee and shin pain    Knee pain, left  S/P TKA July 2022. Continues to have pain and reduced mobility, worsening. She has had two events when she twisted her knee.    Taking ibuprofen 200 mg once daily as needed, Excedrin as needed, using topical CBD cream and horse [place] topical cream which helps     Current medicines (including changes today)  Current Outpatient Medications   Medication Sig Dispense Refill    atorvastatin (LIPITOR) 10 MG Tab TAKE 1 TABLET EVERY EVENING 90 Tablet 3    ketoconazole (NIZORAL) 2 % Cream APPLY 1 APPLICATION TOPICALLY TWO TIMES A DAY AS NEEDED FOR RASH 30 g 23    clindamycin (CLEOCIN) 1 % Solution Apply 1 Application topically 2 times a day. Apply 1-2 times daily to affected areas 60 mL 3    meloxicam (MOBIC) 7.5 MG Tab Take 1 Tablet by mouth 1 time a day as needed for Moderate Pain or Inflammation. 90 Tablet 1    Levocetirizine Dihydrochloride 5 MG Tab Take  by mouth every day.      PROCTOSOL HC 2.5 % Cream APPLY 1 PEASIZED AMOUNT TOPICALLY TO AFFECTED AREA TWICE DAILY FOR TWO WEEKS      triamcinolone acetonide (KENALOG) 0.1 % Cream Apply  to affected area(s) as needed.      fluticasone (FLONASE) 50 MCG/ACT nasal spray USE 2 SPRAYS IN EACH NOSTRIL DAILY AS NEEDED 16 g 3    mupirocin (BACTROBAN) 2 % OINT Apply  to affected area(s) as needed.      Glucosamine-Chondroit-Vit C-Mn (GLUCOSAMINE CHONDR 1500 COMPLX PO) Take  by mouth every day.      Multiple Vitamin (MULTI-VITAMIN DAILY PO) Take  by mouth.       No current facility-administered medications for this visit.     She  has a past medical history of Abnormal uterine bleeding, Abscess, Acne, Adverse effect of anesthesia, Anesthesia, Breast mass, right, Colon polyps, Dermatitis, Elevated random blood glucose level, Elevated transaminase level, Environmental and seasonal allergies, GERD (gastroesophageal reflux disease), Hyperlipidemia, Migraine, Obesity,  Osteoarthritis, Pain (04/19/2018), Snoring, Tinnitus, and Vitamin D deficiency.    ROS   No chest pain, no shortness of breath, no abdominal pain  Positive ROS as per HPI.  All other systems reviewed and are negative.     Objective:     /68   Pulse 70   Temp 36 °C (96.8 °F) (Temporal)   SpO2 96%  There is no height or weight on file to calculate BMI.     Physical Exam:  Constitutional: Alert, no distress.  Skin: Warm, dry, good turgor, no rashes in visible areas.  Eye: Equal, round and reactive, conjunctiva clear, lids normal.  ENMT: Lips without lesions, good dentition  Respiratory: Unlabored respiratory effort  Psych: Alert and oriented x3, normal affect and mood.      Assessment and Plan:   The following treatment plan was discussed    1. Chronic pain of left knee  Unstable  Check xray of both knee and tib/fib to evaluate underlying pathology  Possibly OA vs hardware malfunction  Discussed following back up with surgery vs discussing with non surgical interventionalist, she would like to see physiatry  - DX-KNEE 3 VIEWS LEFT; Future  - DX-TIBIA AND FIBULA LEFT; Future  - Referral to Pain Clinic    2. Pain in left shin  Unstable  As above  - DX-TIBIA AND FIBULA LEFT; Future  - Referral to Pain Clinic      Followup: Return for Care As Needed.    The MA is performing the above orders under the direction of Dr. Lizama    Please note that this dictation was created using voice recognition software. I have made every reasonable attempt to correct obvious errors, but I expect that there are errors of grammar and possibly content that I did not discover before finalizing the note.

## 2023-07-07 NOTE — ASSESSMENT & PLAN NOTE
S/P TKA July 2022. Continues to have pain and reduced mobility, worsening. She has had two events when she twisted her knee.    Taking ibuprofen 200 mg once daily as needed, Excedrin as needed, using topical CBD cream and horse [place] topical cream which helps

## 2023-08-16 ENCOUNTER — APPOINTMENT (OUTPATIENT)
Dept: PHYSICAL MEDICINE AND REHAB | Facility: MEDICAL CENTER | Age: 68
End: 2023-08-16
Payer: COMMERCIAL

## 2023-08-31 ENCOUNTER — PATIENT MESSAGE (OUTPATIENT)
Dept: MEDICAL GROUP | Facility: MEDICAL CENTER | Age: 68
End: 2023-08-31
Payer: COMMERCIAL

## 2023-08-31 DIAGNOSIS — M79.674 GREAT TOE PAIN, RIGHT: ICD-10-CM

## 2023-09-01 NOTE — PROGRESS NOTES
Patient reports severe right great toe pain and bump formed. Requesting referral to podiatry Dr. Iqbal.     DIAN Herrera.

## 2023-09-08 ENCOUNTER — APPOINTMENT (OUTPATIENT)
Dept: MEDICAL GROUP | Facility: MEDICAL CENTER | Age: 68
End: 2023-09-08
Payer: COMMERCIAL

## 2023-09-08 DIAGNOSIS — L73.2 HIDRADENITIS SUPPURATIVA: ICD-10-CM

## 2023-09-11 RX ORDER — CLINDAMYCIN PHOSPHATE 11.9 MG/ML
SOLUTION TOPICAL
Qty: 60 ML | Refills: 11 | Status: SHIPPED | OUTPATIENT
Start: 2023-09-11

## 2023-09-11 NOTE — TELEPHONE ENCOUNTER
Received request via: Pharmacy    Was the patient seen in the last year in this department? Yes    Does the patient have an active prescription (recently filled or refills available) for medication(s) requested? yes    Does the patient have long-term Plus and need 100 day supply (blood pressure, diabetes and cholesterol meds only)? Patient does not have SCP   Requested Prescriptions     Pending Prescriptions Disp Refills    clindamycin (CLEOCIN) 1 % Solution [Pharmacy Med Name: CLINDAMYCIN PHOS TOP  SOLN 1%] 60 mL 11     Sig: APPLY 1 TO 2 TIMES DAILY TO AFFECTED AREAS

## 2023-09-21 LAB
ALBUMIN SERPL-MCNC: 4.5 G/DL (ref 3.9–4.9)
ALBUMIN/GLOB SERPL: 1.5 {RATIO} (ref 1.2–2.2)
ALP SERPL-CCNC: 78 IU/L (ref 44–121)
ALT SERPL-CCNC: 19 IU/L (ref 0–32)
AST SERPL-CCNC: 21 IU/L (ref 0–40)
BILIRUB SERPL-MCNC: 1 MG/DL (ref 0–1.2)
BUN SERPL-MCNC: 17 MG/DL (ref 8–27)
BUN/CREAT SERPL: 20 (ref 12–28)
CALCIUM SERPL-MCNC: 9.6 MG/DL (ref 8.7–10.3)
CHLORIDE SERPL-SCNC: 102 MMOL/L (ref 96–106)
CO2 SERPL-SCNC: 25 MMOL/L (ref 20–29)
CREAT SERPL-MCNC: 0.84 MG/DL (ref 0.57–1)
EGFRCR SERPLBLD CKD-EPI 2021: 76 ML/MIN/1.73
GLOBULIN SER CALC-MCNC: 3 G/DL (ref 1.5–4.5)
GLUCOSE SERPL-MCNC: 109 MG/DL (ref 70–99)
POTASSIUM SERPL-SCNC: 5 MMOL/L (ref 3.5–5.2)
PROT SERPL-MCNC: 7.5 G/DL (ref 6–8.5)
SODIUM SERPL-SCNC: 143 MMOL/L (ref 134–144)

## 2023-09-25 ENCOUNTER — HOSPITAL ENCOUNTER (OUTPATIENT)
Dept: RADIOLOGY | Facility: MEDICAL CENTER | Age: 68
End: 2023-09-25
Attending: NURSE PRACTITIONER
Payer: COMMERCIAL

## 2023-09-25 DIAGNOSIS — Z12.31 VISIT FOR SCREENING MAMMOGRAM: ICD-10-CM

## 2023-09-25 PROCEDURE — 77063 BREAST TOMOSYNTHESIS BI: CPT

## 2023-10-10 ENCOUNTER — OFFICE VISIT (OUTPATIENT)
Dept: PHYSICAL MEDICINE AND REHAB | Facility: MEDICAL CENTER | Age: 68
End: 2023-10-10
Payer: COMMERCIAL

## 2023-10-10 VITALS
OXYGEN SATURATION: 94 % | HEART RATE: 75 BPM | BODY MASS INDEX: 34.02 KG/M2 | HEIGHT: 63 IN | DIASTOLIC BLOOD PRESSURE: 68 MMHG | WEIGHT: 192 LBS | SYSTOLIC BLOOD PRESSURE: 108 MMHG | TEMPERATURE: 97 F

## 2023-10-10 DIAGNOSIS — M25.551 CHRONIC HIP PAIN, BILATERAL: ICD-10-CM

## 2023-10-10 DIAGNOSIS — Z71.82 EXERCISE COUNSELING: ICD-10-CM

## 2023-10-10 DIAGNOSIS — G89.29 CHRONIC PAIN OF BOTH LOWER EXTREMITIES: ICD-10-CM

## 2023-10-10 DIAGNOSIS — E66.9 OBESITY (BMI 30-39.9): ICD-10-CM

## 2023-10-10 DIAGNOSIS — M54.41 CHRONIC BILATERAL LOW BACK PAIN WITH BILATERAL SCIATICA: ICD-10-CM

## 2023-10-10 DIAGNOSIS — G89.29 CHRONIC BILATERAL LOW BACK PAIN WITH BILATERAL SCIATICA: ICD-10-CM

## 2023-10-10 DIAGNOSIS — M25.552 CHRONIC HIP PAIN, BILATERAL: ICD-10-CM

## 2023-10-10 DIAGNOSIS — G89.29 CHRONIC PAIN OF LEFT KNEE: ICD-10-CM

## 2023-10-10 DIAGNOSIS — M54.42 CHRONIC BILATERAL LOW BACK PAIN WITH BILATERAL SCIATICA: ICD-10-CM

## 2023-10-10 DIAGNOSIS — G89.29 CHRONIC HIP PAIN, BILATERAL: ICD-10-CM

## 2023-10-10 DIAGNOSIS — M16.10 ARTHRITIS OF HIP: ICD-10-CM

## 2023-10-10 DIAGNOSIS — M79.604 CHRONIC PAIN OF BOTH LOWER EXTREMITIES: ICD-10-CM

## 2023-10-10 DIAGNOSIS — Z96.652 HISTORY OF LEFT KNEE REPLACEMENT: ICD-10-CM

## 2023-10-10 DIAGNOSIS — M54.16 LUMBAR RADICULOPATHY: ICD-10-CM

## 2023-10-10 DIAGNOSIS — M25.562 CHRONIC PAIN OF LEFT KNEE: ICD-10-CM

## 2023-10-10 DIAGNOSIS — M79.605 CHRONIC PAIN OF BOTH LOWER EXTREMITIES: ICD-10-CM

## 2023-10-10 PROCEDURE — 3078F DIAST BP <80 MM HG: CPT | Performed by: PHYSICAL MEDICINE & REHABILITATION

## 2023-10-10 PROCEDURE — 3074F SYST BP LT 130 MM HG: CPT | Performed by: PHYSICAL MEDICINE & REHABILITATION

## 2023-10-10 PROCEDURE — 99204 OFFICE O/P NEW MOD 45 MIN: CPT | Performed by: PHYSICAL MEDICINE & REHABILITATION

## 2023-10-10 PROCEDURE — 1125F AMNT PAIN NOTED PAIN PRSNT: CPT | Performed by: PHYSICAL MEDICINE & REHABILITATION

## 2023-10-10 ASSESSMENT — PATIENT HEALTH QUESTIONNAIRE - PHQ9
CLINICAL INTERPRETATION OF PHQ2 SCORE: 1
SUM OF ALL RESPONSES TO PHQ QUESTIONS 1-9: 3
5. POOR APPETITE OR OVEREATING: 0 - NOT AT ALL

## 2023-10-10 ASSESSMENT — PAIN SCALES - GENERAL: PAINLEVEL: 5=MODERATE PAIN

## 2023-10-10 ASSESSMENT — FIBROSIS 4 INDEX: FIB4 SCORE: 1.38

## 2023-10-10 NOTE — PROGRESS NOTES
"New patient note    Interventional spine and Pain  Physiatry (physical medicine and  Rehabilitation)     Date of service: See epic    Chief complaint:   Chief Complaint   Patient presents with    New Patient     Left knee        Referring provider: Vibha Good A.P.R*     HISTORY    HPI: Shaunna Vieira 67 y.o.  who presents today with Diagnoses of Chronic bilateral low back pain with bilateral sciatica, Lumbar radiculopathy, Chronic pain of both lower extremities, Chronic hip pain, bilateral, Arthritis of hip, Chronic pain of left knee, History of left knee replacement, Obesity (BMI 30-39.9), and Exercise counseling were pertinent to this visit.    HPI    Left knee pain is overall stable but there is intermittent pain.     There is pain that radiates down the left leg. Pain is worse in the left lower leg. Intermittent. Moderate.     Chronic bilateral hip pain.      She reports seeing vascular specialists Dr. Stokes and reported that there were no vascular issues for the pain.     She does get improvement with meloxicam but she wants to avoid NSAIDs    Medical records review:  I reviewed the note from the referring provider Vibha Good A.P.R* including the note dated 7/7/2023.           ROS:   Red Flags ROS:   Fever, Chills, Sweats: Denies  Involuntary Weight Loss: Denies  Bladder Incontinence: Denies  Bowel Incontinence: denies  Saddle Anesthesia: Denies    All other systems reviewed and negative.       PMHx:   Past Medical History:   Diagnosis Date    Abnormal uterine bleeding     Abscess     skin rashes, ulcerations    Acne     Adverse effect of anesthesia     prolonged \"fog\"    Anesthesia     confused and drowsy for a week afterwards    Breast mass, right     Colon polyps     Dermatitis     Elevated random blood glucose level     Elevated transaminase level     Environmental and seasonal allergies     GERD (gastroesophageal reflux disease)     Hyperlipidemia     Migraine     Obesity     " Osteoarthritis     Pain 04/19/2018    left knee and hip, 0/10    Snoring     no sleep  study    Tinnitus     Vitamin D deficiency          Current Outpatient Medications on File Prior to Visit   Medication Sig Dispense Refill    clindamycin (CLEOCIN) 1 % Solution APPLY 1 TO 2 TIMES DAILY TO AFFECTED AREAS 60 mL 11    atorvastatin (LIPITOR) 10 MG Tab TAKE 1 TABLET EVERY EVENING 90 Tablet 3    ketoconazole (NIZORAL) 2 % Cream APPLY 1 APPLICATION TOPICALLY TWO TIMES A DAY AS NEEDED FOR RASH 30 g 23    meloxicam (MOBIC) 7.5 MG Tab Take 1 Tablet by mouth 1 time a day as needed for Moderate Pain or Inflammation. 90 Tablet 1    Levocetirizine Dihydrochloride 5 MG Tab Take  by mouth every day.      PROCTOSOL HC 2.5 % Cream APPLY 1 PEASIZED AMOUNT TOPICALLY TO AFFECTED AREA TWICE DAILY FOR TWO WEEKS      triamcinolone acetonide (KENALOG) 0.1 % Cream Apply  to affected area(s) as needed.      fluticasone (FLONASE) 50 MCG/ACT nasal spray USE 2 SPRAYS IN EACH NOSTRIL DAILY AS NEEDED 16 g 3    mupirocin (BACTROBAN) 2 % OINT Apply  to affected area(s) as needed.      Glucosamine-Chondroit-Vit C-Mn (GLUCOSAMINE CHONDR 1500 COMPLX PO) Take  by mouth every day.      Multiple Vitamin (MULTI-VITAMIN DAILY PO) Take  by mouth.       No current facility-administered medications on file prior to visit.        PSHx:   Past Surgical History:   Procedure Laterality Date    PB TOTAL KNEE ARTHROPLASTY Left 7/12/2022    Procedure: LEFT ARTHROPLASTY, KNEE, TOTAL;  Surgeon: Pollo Brooks M.D.;  Location: SURGERY [facility];  Service: Orthopedics    HYSTEROSCOPY WITH MYOSURE  5/4/2018    Procedure: HYSTEROSCOPY WITH MYOSURE/ WITH POLYPECTOMY;  Surgeon: Lise Perrin M.D.;  Location: SURGERY SAME DAY [facility];  Service: Gynecology    DILATION AND CURETTAGE  5/4/2018    Procedure: DILATION AND CURETTAGE;  Surgeon: Lise Perrin M.D.;  Location: SURGERY SAME DAY [facility];  Service: Gynecology    KNEE ARTHROSCOPY Left 7/17/2015     Procedure: KNEE ARTHROSCOPY;  Surgeon: Fantasma Dominique M.D.;  Location: SURGERY [facility];  Service:     MENISCECTOMY Left 7/17/2015    Procedure: MENISCECTOMY PARTIAL LATERAL;  Surgeon: Fantasma Dominique M.D.;  Location: SURGERY [facility];  Service:     SYNOVECTOMY Left 7/17/2015    Procedure: SYNOVECTOMY;  Surgeon: Fantasma Dominique M.D.;  Location: SURGERY [facility];  Service:     OTHER  2015    colonoscopy    BREAST BIOPSY         Family history   Family History   Problem Relation Age of Onset    Other Father         PARKINSON'S    Cancer Father         lymphoma    Diabetes Father     Breast Cancer Mother     Other Mother         BLINDNESS/MAC DEG/SOKER    Arthritis Mother     Cancer Mother     Stroke Mother         TIA    Arthritis Brother     Other Other         ASBERGERS    Cancer Maternal Grandmother         asbestos         Medications: reviewed on epic.   Outpatient Medications Marked as Taking for the 10/10/23 encounter (Office Visit) with Kd Wong M.D.   Medication Sig Dispense Refill    clindamycin (CLEOCIN) 1 % Solution APPLY 1 TO 2 TIMES DAILY TO AFFECTED AREAS 60 mL 11    atorvastatin (LIPITOR) 10 MG Tab TAKE 1 TABLET EVERY EVENING 90 Tablet 3    ketoconazole (NIZORAL) 2 % Cream APPLY 1 APPLICATION TOPICALLY TWO TIMES A DAY AS NEEDED FOR RASH 30 g 23    meloxicam (MOBIC) 7.5 MG Tab Take 1 Tablet by mouth 1 time a day as needed for Moderate Pain or Inflammation. 90 Tablet 1    Levocetirizine Dihydrochloride 5 MG Tab Take  by mouth every day.      PROCTOSOL HC 2.5 % Cream APPLY 1 PEASIZED AMOUNT TOPICALLY TO AFFECTED AREA TWICE DAILY FOR TWO WEEKS      triamcinolone acetonide (KENALOG) 0.1 % Cream Apply  to affected area(s) as needed.      fluticasone (FLONASE) 50 MCG/ACT nasal spray USE 2 SPRAYS IN EACH NOSTRIL DAILY AS NEEDED 16 g 3    mupirocin (BACTROBAN) 2 % OINT Apply  to affected area(s) as needed.      Glucosamine-Chondroit-Vit C-Mn (GLUCOSAMINE CHONDR 1500 COMPLX  "PO) Take  by mouth every day.      Multiple Vitamin (MULTI-VITAMIN DAILY PO) Take  by mouth.          Allergies:   Allergies   Allergen Reactions    Morphine Shortness of Breath     Also get hallucinations    Nystatin Rash     .    Prilosec [Omeprazole]     Sulfa Drugs Unspecified     Pt doesn't know what happens, had as a child    Pravastatin Myalgia    Bio-35 Gluten-Free [Actical] Anxiety     Causes problems with gut undiagnosed    [place] Unspecified     Positive skin test    Lanolin Unspecified     Positive skin test       Social Hx:   Social History     Socioeconomic History    Marital status:      Spouse name: Not on file    Number of children: Not on file    Years of education: Not on file    Highest education level: Not on file   Occupational History    Not on file   Tobacco Use    Smoking status: Never    Smokeless tobacco: Never   Substance and Sexual Activity    Alcohol use: Yes     Comment: 1 per month    Drug use: No    Sexual activity: Never   Other Topics Concern    Not on file   Social History Narrative    Not on file     Social Determinants of Health     Financial Resource Strain: Not on file   Food Insecurity: Not on file   Transportation Needs: Not on file   Physical Activity: Not on file   Stress: Not on file   Social Connections: Not on file   Intimate Partner Violence: Not on file   Housing Stability: Not on file         EXAMINATION     Physical Exam:   Vitals: /68 (BP Location: Right arm, Patient Position: Sitting, BP Cuff Size: Adult)   Pulse 75   Temp 36.1 °C (97 °F) (Temporal)   Ht 1.6 m (5' 3\")   Wt 87.1 kg (192 lb)   SpO2 94%     Constitutional:   Body Habitus: Body mass index is 34.01 kg/m².  Cooperation: Fully cooperates with exam  Appearance: Well-groomed, well-nourished, not disheveled     Eyes: No scleral icterus to suggest severe liver disease, no proptosis to suggest severe hyperthyroid    ENT -no obvious auditory deficits, no obvious tongue lesions, tongue " midline, no facial droop     Skin -no rashes or lesions noted     Respiratory-  breathing comfortable on room air, no audible wheezing    Cardiovascular- capillary refills less than 2 seconds.     Psychiatric- alert and oriented ×3. Normal affect.     Musculoskeletal and Neuro -         Thoracic/Lumbar Spine/Sacral Spine/Hips   Inspection: No evidence of atrophy in bilateral lower extremities throughout     ROM: decreased active range of motion with flexion, lateral flexion, and rotation bilaterally.   There is decreased active range of motion with lumbar extension with pain.    There is pain with facet loading maneuver (extension rotation) with axial low back pain on the LEFT side(s)    Palpation:   No tenderness to palpation in midline at T1-T12 levels. No tenderness to palpation in the left and right of the midline T1-L5, NEGATIVE for tenderness to palpation to the para-midline region in the lower lumbar levels.  palpation over SI joint: negative bilaterally    palpation in hip or over the gluteus medius tendon insertion: negative bilaterally      Lumbar spine Special tests  Neuro tension  Straight leg test negative right, positive left    Slump test negative right, positive left      HIP  FAIR test positive bilaterally    Range of motion in the RIGHT hip is decreased in flexion, extension, abduction, internal rotation, external rotation.  Range of motion in the LEFT hip is decreased in flexion, extension, abduction, internal rotation, external rotation.      SI joint tests  Observation patient sits on one buttocks: Negative  SI joint compression negative right, positive left    SI joint distraction negative right, positive left    Thigh thrust test negative right, positive left    MAG test negative right, positive left                 Key points for the international standards for neurological classification of spinal cord injury (ISNCSCI) to light touch.     Dermatome R L                                     "  L2 2 2   L3 2 2   L4 2 2   L5 2 2   S1 2 2   S2 2 2       Motor Exam Lower Extremities    ? Myotome R L   Hip flexion L2 5 5   Knee extension L3 5 5   Ankle dorsiflexion L4 5 4+   Toe extension L5 5 4   Ankle plantarflexion S1 5 4+         Reflexes  Clonus of the ankle negative bilaterally       MEDICAL DECISION MAKING    Medical records review: see under HPI section.     DATA    Labs:   Lab Results   Component Value Date/Time    SODIUM 143 09/20/2023 05:01 AM    SODIUM 137 06/28/2022 11:17 AM    POTASSIUM 5.0 09/20/2023 05:01 AM    POTASSIUM 4.1 06/28/2022 11:17 AM    CHLORIDE 102 09/20/2023 05:01 AM    CHLORIDE 100 06/28/2022 11:17 AM    CO2 25 09/20/2023 05:01 AM    CO2 27 06/28/2022 11:17 AM    ANION 10.0 06/28/2022 11:17 AM    GLUCOSE 109 (H) 09/20/2023 05:01 AM    GLUCOSE 99 06/28/2022 11:17 AM    BUN 17 09/20/2023 05:01 AM    BUN 13 06/28/2022 11:17 AM    CREATININE 0.84 09/20/2023 05:01 AM    CREATININE 0.75 06/28/2022 11:17 AM    CALCIUM 9.6 09/20/2023 05:01 AM    CALCIUM 9.3 06/28/2022 11:17 AM    ASTSGOT 21 09/20/2023 05:01 AM    ALTSGPT 19 09/20/2023 05:01 AM    TBILIRUBIN 1.0 09/20/2023 05:01 AM    ALBUMIN 4.5 09/20/2023 05:01 AM    TOTPROTEIN 7.5 09/20/2023 05:01 AM    GLOBULIN 3.0 09/20/2023 05:01 AM    AGRATIO 1.5 09/20/2023 05:01 AM   ]    No results found for: \"PROTHROMBTM\", \"INR\"     Lab Results   Component Value Date/Time    WBC 5.2 06/28/2022 11:17 AM    RBC 4.85 06/28/2022 11:17 AM    HEMOGLOBIN 14.2 06/28/2022 11:17 AM    HEMATOCRIT 42.1 06/28/2022 11:17 AM    MCV 86.8 06/28/2022 11:17 AM    MCH 29.3 06/28/2022 11:17 AM    MCHC 33.7 06/28/2022 11:17 AM    MPV 10.2 06/28/2022 11:17 AM    NEUTSPOLYS 68 12/08/2021 05:10 AM    NEUTSPOLYS 57.60 04/19/2018 03:55 PM    LYMPHOCYTES 22 12/08/2021 05:10 AM    LYMPHOCYTES 29.90 04/19/2018 03:55 PM    MONOCYTES 8 12/08/2021 05:10 AM    MONOCYTES 9.10 04/19/2018 03:55 PM    EOSINOPHILS 1 12/08/2021 05:10 AM    EOSINOPHILS 2.30 04/19/2018 03:55 PM    " BASOPHILS 1 12/08/2021 05:10 AM    BASOPHILS 0.90 04/19/2018 03:55 PM        Lab Results   Component Value Date/Time    HBA1C 5.8 (H) 12/08/2021 05:10 AM        Imaging:   I personally reviewed following images, these are my reads      X-ray left knee 7/7/2023  No overt signs of acute abnormality of the hardware.    IMAGING radiology reads. I reviewed the following radiology reads                                                                    Results for orders placed during the hospital encounter of 07/05/10    DX-CHEST-2 VIEWS         Results for orders placed during the hospital encounter of 07/06/05    DX-FOOT-COMPLETE 3+    Impression  IMPRESSION:    1. MILD GREAT TOE METATARSOPHALANGEAL JOINT OSTEOARTHRITIS.  2. WELL-DEFINED PLANTAR HEEL SPUR WITH NO EVIDENCE OF INFLAMMATORY  PROCESS.      Read By CLEMENT ANG MD on Jul 6 2005  3:36PM  : ILEANA Transcription Date: Jul 7 2005  3:22AM  THIS DOCUMENT HAS BEEN ELECTRONICALLY SIGNED BY: CLEMENT ANG MD on  Jul 7 2005 10:04AM        Results for orders placed during the hospital encounter of 09/18/08    DX-HIP-COMPLETE - UNILATERAL 2+    Impression  IMPRESSION:    NO RADIOGRAPHIC EVIDENCE OF ACUTE OR SUBACUTE TRAUMATIC INJURY AND  MARGINAL OSTEOPHYTE FORMATION IS NOT SEEN.      TJS/bht    Read By MENA COYLE MD on Sep 18 2008  4:10PM  : BHT Transcription Date: Sep 19 2008  3:04PM  THIS DOCUMENT HAS BEEN ELECTRONICALLY SIGNED BY: MENA COYLE MD on  Sep 20 2008  5:31PM     Results for orders placed during the hospital encounter of 07/12/22    DX-KNEE 2- LEFT    Impression  1. Status post left knee replacement without evidence of hardware complication.   Results for orders placed in visit on 07/07/23    DX-KNEE 3 VIEWS LEFT    Impression  TKA is unremarkable. Normal exam.    Results for orders placed in visit on 05/19/22    DX-KNEE COMPLETE 4+ LEFT                 Results for orders placed in visit on  07/07/23    DX-TIBIA AND FIBULA LEFT    Impression  Normal exam           Diagnosis   Visit Diagnoses     ICD-10-CM   1. Chronic bilateral low back pain with bilateral sciatica  M54.42    M54.41    G89.29   2. Lumbar radiculopathy  M54.16   3. Chronic pain of both lower extremities  M79.604    M79.605    G89.29   4. Chronic hip pain, bilateral  M25.551    M25.552    G89.29   5. Arthritis of hip  M16.10   6. Chronic pain of left knee  M25.562    G89.29   7. History of left knee replacement  Z96.652   8. Obesity (BMI 30-39.9)  E66.9   9. Exercise counseling  Z71.82           ASSESSMENT AND PLAN:  Shaunna Vieira 67 y.o. female      Shaunna was seen today for new patient.    Diagnoses and all orders for this visit:    Chronic bilateral low back pain with bilateral sciatica  -     DX-LUMBAR SPINE-2 OR 3 VIEWS; Future  -     MR-LUMBAR SPINE-W/O; Future  -     Referral to [facility] - Physiatry (PMR)    Lumbar radiculopathy  -     DX-LUMBAR SPINE-2 OR 3 VIEWS; Future  -     MR-LUMBAR SPINE-W/O; Future  -     Referral to [facility] - Physiatry (PMR)    Chronic pain of both lower extremities  -     DX-LUMBAR SPINE-2 OR 3 VIEWS; Future  -     MR-LUMBAR SPINE-W/O; Future  -     Referral to [facility] - Physiatry (PMR)    Chronic hip pain, bilateral  -     DX-LUMBAR SPINE-2 OR 3 VIEWS; Future  -     MR-LUMBAR SPINE-W/O; Future  -     Referral to [facility] - Physiatry (PMR)    Arthritis of hip  -     DX-LUMBAR SPINE-2 OR 3 VIEWS; Future  -     MR-LUMBAR SPINE-W/O; Future  -     Referral to [facility] - Physiatry (PMR)    Chronic pain of left knee  -     DX-LUMBAR SPINE-2 OR 3 VIEWS; Future  -     MR-LUMBAR SPINE-W/O; Future  -     Referral to [facility] - Physiatry (PMR)    History of left knee replacement  -     DX-LUMBAR SPINE-2 OR 3 VIEWS; Future  -     MR-LUMBAR SPINE-W/O; Future  -     Referral to [facility] - Physiatry (PMR)    Obesity (BMI 30-39.9)    Exercise counseling      I believe the pain is not coming from the knee itself and the signs and symptoms are  mostly consistent with a left lumbar radiculopathy.  The patient has failed conservative treatments.    Diagnostic workup: As above    Medications:   For now continue meloxicam 7.5-daily as needed during flares of pain    Interventional program: I would consider the patient for an epidural steroid injection depending on the results of the above       Outside records requested:  The patient signed outside records request form for her outside records including outside images. This includes the records from [facility]      Follow-up: After the above diagnostic studies           Please note that this dictation was created using voice recognition software. I have made every reasonable attempt to correct obvious errors but there may be errors of grammar and content that I may have overlooked prior to finalization of this note.      Kd Wong MD  Physical Medicine and Rehabilitation  Interventional Spine and Sports Physiatry  [facility]          DIAN Herrera.

## 2023-10-11 ENCOUNTER — APPOINTMENT (OUTPATIENT)
Dept: RADIOLOGY | Facility: MEDICAL CENTER | Age: 68
End: 2023-10-11
Attending: PHYSICAL MEDICINE & REHABILITATION
Payer: COMMERCIAL

## 2023-10-11 DIAGNOSIS — M16.10 ARTHRITIS OF HIP: ICD-10-CM

## 2023-10-11 DIAGNOSIS — Z96.652 HISTORY OF LEFT KNEE REPLACEMENT: ICD-10-CM

## 2023-10-11 DIAGNOSIS — G89.29 CHRONIC HIP PAIN, BILATERAL: ICD-10-CM

## 2023-10-11 DIAGNOSIS — G89.29 CHRONIC BILATERAL LOW BACK PAIN WITH BILATERAL SCIATICA: ICD-10-CM

## 2023-10-11 DIAGNOSIS — G89.29 CHRONIC PAIN OF BOTH LOWER EXTREMITIES: ICD-10-CM

## 2023-10-11 DIAGNOSIS — M25.552 CHRONIC HIP PAIN, BILATERAL: ICD-10-CM

## 2023-10-11 DIAGNOSIS — M25.562 CHRONIC PAIN OF LEFT KNEE: ICD-10-CM

## 2023-10-11 DIAGNOSIS — M54.42 CHRONIC BILATERAL LOW BACK PAIN WITH BILATERAL SCIATICA: ICD-10-CM

## 2023-10-11 DIAGNOSIS — G89.29 CHRONIC PAIN OF LEFT KNEE: ICD-10-CM

## 2023-10-11 DIAGNOSIS — M79.605 CHRONIC PAIN OF BOTH LOWER EXTREMITIES: ICD-10-CM

## 2023-10-11 DIAGNOSIS — M54.16 LUMBAR RADICULOPATHY: ICD-10-CM

## 2023-10-11 DIAGNOSIS — M25.551 CHRONIC HIP PAIN, BILATERAL: ICD-10-CM

## 2023-10-11 DIAGNOSIS — M54.41 CHRONIC BILATERAL LOW BACK PAIN WITH BILATERAL SCIATICA: ICD-10-CM

## 2023-10-11 DIAGNOSIS — M79.604 CHRONIC PAIN OF BOTH LOWER EXTREMITIES: ICD-10-CM

## 2023-10-11 PROCEDURE — 72100 X-RAY EXAM L-S SPINE 2/3 VWS: CPT

## 2023-10-11 PROCEDURE — 73521 X-RAY EXAM HIPS BI 2 VIEWS: CPT

## 2023-10-16 ENCOUNTER — HOSPITAL ENCOUNTER (OUTPATIENT)
Facility: MEDICAL CENTER | Age: 68
End: 2023-10-16
Attending: REGISTERED NURSE
Payer: COMMERCIAL

## 2023-10-16 ENCOUNTER — OFFICE VISIT (OUTPATIENT)
Dept: URGENT CARE | Facility: CLINIC | Age: 68
End: 2023-10-16
Payer: COMMERCIAL

## 2023-10-16 VITALS
RESPIRATION RATE: 16 BRPM | TEMPERATURE: 96.5 F | WEIGHT: 192 LBS | SYSTOLIC BLOOD PRESSURE: 108 MMHG | BODY MASS INDEX: 34.02 KG/M2 | DIASTOLIC BLOOD PRESSURE: 70 MMHG | HEART RATE: 68 BPM | HEIGHT: 63 IN | OXYGEN SATURATION: 96 %

## 2023-10-16 DIAGNOSIS — J02.9 SORE THROAT: ICD-10-CM

## 2023-10-16 DIAGNOSIS — N30.01 ACUTE CYSTITIS WITH HEMATURIA: ICD-10-CM

## 2023-10-16 LAB
APPEARANCE UR: NORMAL
BILIRUB UR STRIP-MCNC: NEGATIVE MG/DL
COLOR UR AUTO: YELLOW
GLUCOSE UR STRIP.AUTO-MCNC: NEGATIVE MG/DL
KETONES UR STRIP.AUTO-MCNC: NEGATIVE MG/DL
LEUKOCYTE ESTERASE UR QL STRIP.AUTO: NEGATIVE
NITRITE UR QL STRIP.AUTO: NEGATIVE
PH UR STRIP.AUTO: 7.5 [PH] (ref 5–8)
PROT UR QL STRIP: NEGATIVE MG/DL
RBC UR QL AUTO: NORMAL
S PYO DNA SPEC NAA+PROBE: NOT DETECTED
SP GR UR STRIP.AUTO: 1.01
UROBILINOGEN UR STRIP-MCNC: 0.2 MG/DL

## 2023-10-16 PROCEDURE — 81002 URINALYSIS NONAUTO W/O SCOPE: CPT | Performed by: REGISTERED NURSE

## 2023-10-16 PROCEDURE — 3074F SYST BP LT 130 MM HG: CPT | Performed by: REGISTERED NURSE

## 2023-10-16 PROCEDURE — 87086 URINE CULTURE/COLONY COUNT: CPT

## 2023-10-16 PROCEDURE — 99214 OFFICE O/P EST MOD 30 MIN: CPT | Performed by: REGISTERED NURSE

## 2023-10-16 PROCEDURE — 87651 STREP A DNA AMP PROBE: CPT | Performed by: REGISTERED NURSE

## 2023-10-16 PROCEDURE — 3078F DIAST BP <80 MM HG: CPT | Performed by: REGISTERED NURSE

## 2023-10-16 ASSESSMENT — ENCOUNTER SYMPTOMS
HEADACHES: 0
FEVER: 0
CHILLS: 0
DIARRHEA: 0
SHORTNESS OF BREATH: 0
DIZZINESS: 0
FLANK PAIN: 0
MYALGIAS: 0
VOMITING: 0
NAUSEA: 0
ABDOMINAL PAIN: 0
PALPITATIONS: 0

## 2023-10-16 ASSESSMENT — FIBROSIS 4 INDEX: FIB4 SCORE: 1.38

## 2023-10-16 NOTE — PROGRESS NOTES
"Subjective:   Shaunna Vieira is a 67 y.o. female who presents for Urinary Frequency (Started last night) and Pharyngitis (Started Thursday morning, \" I noticed red dots with white centers in the back of my throat.\" )      HPI  Patient presenting with 2 separate complaints, first she developed a sore throat 4 to 5 days ago with some red dots with white spots in the back of the throat, this seems to be improving. Has used salt water gargling and Listerine. No sick exposures.     Additionally yesterday developed urinary frequency. Does have a remote hx of UTIs. Did notice some blood tinged urine once. No hx of kidney infections or hospitalizations from UTI. Is not using any OTC medications for urinary symptoms. Tolerating PO.     Review of Systems   Constitutional:  Negative for chills and fever.   HENT:  Negative for congestion.    Respiratory:  Negative for shortness of breath.    Cardiovascular:  Negative for chest pain, palpitations and leg swelling.   Gastrointestinal:  Negative for abdominal pain, diarrhea, nausea and vomiting.   Genitourinary:  Positive for frequency and hematuria. Negative for flank pain.   Musculoskeletal:  Negative for myalgias.   Skin:  Negative for rash.   Neurological:  Negative for dizziness and headaches.       Allergies   Allergen Reactions    Morphine Shortness of Breath     Also get hallucinations    Nystatin Rash     .    Prilosec [Omeprazole]     Sulfa Drugs Unspecified     Pt doesn't know what happens, had as a child    Pravastatin Myalgia    Bio-35 Gluten-Free [Actical] Anxiety     Causes problems with gut undiagnosed    [place] Unspecified     Positive skin test    Lanolin Unspecified     Positive skin test       Patient Active Problem List    Diagnosis Date Noted    Osteoarthritis of left knee, unspecified osteoarthritis type 07/12/2022    Osteoarthritis of left knee 05/25/2022    Elevated coronary artery calcium score 03/17/2022    Hidradenitis suppurativa 12/22/2021    " Pt called he would like to discuss the labs results and has other questions for the doctor. I asked if he could submit questions to portal, he refused pt wanted to speak with the doctor. Ok to call back when available.    Anxiety 08/31/2020    Left ear pain 08/02/2019    Thumb joint locking 08/02/2019    Postmenopausal 07/15/2019    Obesity (BMI 30-39.9) 06/13/2019    Osteoarthrosis 05/10/2019    Venous insufficiency 05/10/2019    Chronic pruritic rash in adult 05/10/2019    Viral upper respiratory tract infection 02/20/2019    Abnormal renal function 10/01/2018    Pain in both lower extremities 07/02/2018    Spider veins 07/02/2018    Prediabetes 07/02/2018    Leg cramps 04/24/2018    Health care maintenance 03/07/2018    Abnormal uterine bleeding 03/07/2018    Cervix abnormality 03/07/2018    Colon polyps 08/25/2016    Impaired fasting glucose 08/25/2016    Vitamin D deficiency 08/25/2016    Hyperlipidemia 08/25/2016    Gastroesophageal reflux disease 08/25/2016    Tinnitus of both ears 08/25/2016    Class 1 obesity without serious comorbidity with body mass index (BMI) of 33.0 to 33.9 in adult 08/25/2016    Predisposition to allergic reaction 08/25/2016    Stress 08/25/2016    Dermatitis 08/25/2016    Knee pain, left 08/25/2016       Current Outpatient Medications Ordered in Epic   Medication Sig Dispense Refill    clindamycin (CLEOCIN) 1 % Solution APPLY 1 TO 2 TIMES DAILY TO AFFECTED AREAS 60 mL 11    atorvastatin (LIPITOR) 10 MG Tab TAKE 1 TABLET EVERY EVENING 90 Tablet 3    ketoconazole (NIZORAL) 2 % Cream APPLY 1 APPLICATION TOPICALLY TWO TIMES A DAY AS NEEDED FOR RASH 30 g 23    meloxicam (MOBIC) 7.5 MG Tab Take 1 Tablet by mouth 1 time a day as needed for Moderate Pain or Inflammation. 90 Tablet 1    Levocetirizine Dihydrochloride 5 MG Tab Take  by mouth every day.      PROCTOSOL HC 2.5 % Cream APPLY 1 PEASIZED AMOUNT TOPICALLY TO AFFECTED AREA TWICE DAILY FOR TWO WEEKS      triamcinolone acetonide (KENALOG) 0.1 % Cream Apply  to affected area(s) as needed.      fluticasone (FLONASE) 50 MCG/ACT nasal spray USE 2 SPRAYS IN EACH NOSTRIL DAILY AS NEEDED 16 g 3    mupirocin (BACTROBAN) 2 % OINT Apply  to affected area(s) as  needed.      Glucosamine-Chondroit-Vit C-Mn (GLUCOSAMINE CHONDR 1500 COMPLX PO) Take  by mouth every day.      Multiple Vitamin (MULTI-VITAMIN DAILY PO) Take  by mouth.       No current Epic-ordered facility-administered medications on file.       Past Surgical History:   Procedure Laterality Date    PB TOTAL KNEE ARTHROPLASTY Left 7/12/2022    Procedure: LEFT ARTHROPLASTY, KNEE, TOTAL;  Surgeon: Pollo Brooks M.D.;  Location: SURGERY [facility];  Service: Orthopedics    HYSTEROSCOPY WITH MYOSURE  5/4/2018    Procedure: HYSTEROSCOPY WITH MYOSURE/ WITH POLYPECTOMY;  Surgeon: Lise Perrin M.D.;  Location: SURGERY SAME DAY [facility];  Service: Gynecology    DILATION AND CURETTAGE  5/4/2018    Procedure: DILATION AND CURETTAGE;  Surgeon: Lise Perrin M.D.;  Location: SURGERY SAME DAY [facility];  Service: Gynecology    KNEE ARTHROSCOPY Left 7/17/2015    Procedure: KNEE ARTHROSCOPY;  Surgeon: Fantasma Dominique M.D.;  Location: [facility];  Service:     MENISCECTOMY Left 7/17/2015    Procedure: MENISCECTOMY PARTIAL LATERAL;  Surgeon: Fantasma Dominique M.D.;  Location: [facility];  Service:     SYNOVECTOMY Left 7/17/2015    Procedure: SYNOVECTOMY;  Surgeon: Fantasma Dominique M.D.;  Location: [facility];  Service:     OTHER  2015    colonoscopy    BREAST BIOPSY         Social History     Tobacco Use    Smoking status: Never    Smokeless tobacco: Never   Substance Use Topics    Alcohol use: Yes     Comment: 1 per month    Drug use: No       family history includes Arthritis in her brother and mother; Breast Cancer in her mother; Cancer in her father, maternal grandmother, and mother; Diabetes in her father; Other in her father, mother, and another family member; Stroke in her mother.     Problem list, medications, and allergies reviewed by myself today in Epic.     Objective:   /70 (BP Location: Left arm, Patient Position: Sitting, BP Cuff Size: Adult)    "Pulse 68   Temp 35.8 °C (96.5 °F) (Temporal)   Resp 16   Ht 1.6 m (5' 3\")   Wt 87.1 kg (192 lb) Comment: Luis M stated  LMP  (LMP Unknown)   SpO2 96%   BMI 34.01 kg/m²     Physical Exam  Vitals and nursing note reviewed.   Constitutional:       General: She is not in acute distress.     Appearance: Normal appearance. She is well-developed. She is not ill-appearing, toxic-appearing or diaphoretic.   HENT:      Head: Normocephalic and atraumatic.      Right Ear: Tympanic membrane, ear canal and external ear normal.      Left Ear: Tympanic membrane, ear canal and external ear normal.      Nose: Nose normal. No congestion or rhinorrhea.      Mouth/Throat:      Mouth: Mucous membranes are moist.      Pharynx: No oropharyngeal exudate or posterior oropharyngeal erythema.   Eyes:      General:         Right eye: No discharge.         Left eye: No discharge.      Conjunctiva/sclera: Conjunctivae normal.   Cardiovascular:      Rate and Rhythm: Normal rate and regular rhythm.      Pulses: Normal pulses.      Heart sounds: Normal heart sounds.   Pulmonary:      Effort: Pulmonary effort is normal. No respiratory distress.      Breath sounds: Normal breath sounds. No wheezing, rhonchi or rales.   Abdominal:      General: Abdomen is flat.      Palpations: Abdomen is soft.      Tenderness: There is no abdominal tenderness. There is no right CVA tenderness, left CVA tenderness, guarding or rebound.   Musculoskeletal:      Cervical back: Normal range of motion and neck supple.      Right lower leg: No edema.      Left lower leg: No edema.   Lymphadenopathy:      Cervical: No cervical adenopathy.   Skin:     General: Skin is warm and dry.      Capillary Refill: Capillary refill takes less than 2 seconds.   Neurological:      General: No focal deficit present.      Mental Status: She is alert and oriented to person, place, and time. Mental status is at baseline.   Psychiatric:         Mood and Affect: Mood normal.         " Behavior: Behavior normal.         Thought Content: Thought content normal.         Judgment: Judgment normal.       Assessment/Plan:     Diagnosis and associated orders:   1. Acute cystitis with hematuria  POCT Urinalysis    URINE CULTURE(NEW)      2. Sore throat  POCT CEPHEID GROUP A STREP - PCR         Comments/MDM:   Differential diagnosis discussed     Patient presenting with 2 separate complaints, did initially have a sore throat that has been seeming to improve with salt water gargling, Listerine.  Did not have any sick exposures.  But does note that she had red spots and white spots in the back of her throat concern for strep.  We did perform a Cepheid strep which was negative.  Additionally after the symptoms were resolving she started to develop urinary frequency, does have remote history of UTIs and states she was not drinking a lot of fluids while her throat hurt.  Did perform a UA in clinic and the UA positive for trace blood, also has cloudy appearance.  On exam she appears well and nontoxic currently normal ENT findings, no adventitious heart or lung sounds, no abdominal, suprapubic or CVA tenderness, overall well-appearing with reassuring exam findings.  Given the UA findings as well as symptoms will start on Keflex for presumed UTI.  We will send this for culture however to confirm infection.  Her throat symptoms are likely secondary to a viral illness, did offer reassurance and discussed OTC medications as needed for both urinary and throat symptoms.  She needs to significantly increase fluid intake.  Monitor symptoms.  Reviewed at length signs and symptoms that require immediate attention and are indicative of worsening infection.    Return to clinic or go to ED if symptoms worsen or persist. Indications for ED discussed at length. Patient/Parent/Guardian voices understanding. Follow-up with your primary care provider in 3-5 days. Red flag symptoms discussed. All side effects of medication  discussed including allergic response, GI upset, tendon injury, rash, sedation etc.    I personally reviewed prior external notes and test results pertinent to today's visit as well as additional imaging and testing completed in clinic today.     Please note that this dictation was created using voice recognition software. I have made every reasonable attempt to correct obvious errors, but I expect that there are errors of grammar and possibly content that I did not discover before finalizing the note.    This note was electronically signed by DUY Singh

## 2023-10-17 ENCOUNTER — TELEPHONE (OUTPATIENT)
Dept: URGENT CARE | Facility: CLINIC | Age: 68
End: 2023-10-17
Payer: COMMERCIAL

## 2023-10-17 DIAGNOSIS — N30.01 ACUTE CYSTITIS WITH HEMATURIA: ICD-10-CM

## 2023-10-17 RX ORDER — CEPHALEXIN 500 MG/1
500 CAPSULE ORAL 2 TIMES DAILY
Qty: 10 CAPSULE | Refills: 0 | Status: SHIPPED | OUTPATIENT
Start: 2023-10-17 | End: 2023-10-22

## 2023-10-17 NOTE — TELEPHONE ENCOUNTER
Patient called and stated that she was supposed to get abx sent to her pharmacy but those abx were never sent in.    Based on the provider's note on 10/16/23, Keflex 500 MG Cap was meant to be ordered but after talking to one of our Providers here, Dianne Cordero, she was okay to send those abx to the pharmacy on Record as requested by Patient.

## 2023-10-17 NOTE — PATIENT INSTRUCTIONS
Pyelonephritis, Adult    Pyelonephritis is an infection that occurs in the kidney. The kidneys are organs that help clean the blood by moving waste out of the blood and into the pee (urine). This infection can happen quickly, or it can last for a long time. In most cases, it clears up with treatment and does not cause other problems.  What are the causes?  This condition may be caused by:  Germs (bacteria) going from the bladder up to the kidney. This may happen after having a bladder infection.  Germs going from the blood to the kidney.  What increases the risk?  This condition is more likely to develop in:  Pregnant women.  Older people.  People who have any of these conditions:  Diabetes.  Inflammation of the prostate gland (prostatitis), in males.  Kidney stones or bladder stones.  Other problems with the kidney or the parts of your body that carry pee from the kidneys to the bladder (ureters).  Cancer.  People who have a small, thin tube (catheter) placed in the bladder.  People who are sexually active.  Women who use a medicine that kills sperm (spermicide) to prevent pregnancy.  People who have had a prior urinary tract infection (UTI).  What are the signs or symptoms?  Symptoms of this condition include:  Peeing often.  A strong urge to pee right away.  Burning or stinging when peeing.  Belly pain.  Back pain.  Pain in the side (flank area).  Fever or chills.  Blood in the pee, or dark pee.  Feeling sick to your stomach (nauseous) or throwing up (vomiting).  How is this treated?  This condition may be treated by:  Taking antibiotic medicines by mouth (orally).  Drinking enough fluids.  If the infection is bad, you may need to stay in the hospital. You may be given antibiotics and fluids that are put directly into a vein through an IV tube.  In some cases, other treatments may be needed.  Follow these instructions at home:  Medicines  Take your antibiotic medicine as told by your doctor. Do not stop taking  the antibiotic even if you start to feel better.  Take over-the-counter and prescription medicines only as told by your doctor.  General instructions    Drink enough fluid to keep your pee pale yellow.  Avoid caffeine, tea, and carbonated drinks.  Pee (urinate) often. Avoid holding in pee for long periods of time.  Pee before and after sex.  After pooping (having a bowel movement), women should wipe from front to back. Use each tissue only once.  Keep all follow-up visits as told by your doctor. This is important.  Contact a doctor if:  You do not feel better after 2 days.  Your symptoms get worse.  You have a fever.  Get help right away if:  You cannot take your medicine or drink fluids as told.  You have chills and shaking.  You throw up.  You have very bad pain in your side or back.  You feel very weak or you pass out (faint).  Summary  Pyelonephritis is an infection that occurs in the kidney.  In most cases, this infection clears up with treatment and does not cause other problems.  Take your antibiotic medicine as told by your doctor. Do not stop taking the antibiotic even if you start to feel better.  Drink enough fluid to keep your pee pale yellow.  This information is not intended to replace advice given to you by your health care provider. Make sure you discuss any questions you have with your health care provider.  Document Released: 01/25/2006 Document Revised: 10/22/2019 Document Reviewed: 10/22/2019  Elsevier Patient Education © 2020 [employer]

## 2023-10-18 ENCOUNTER — OFFICE VISIT (OUTPATIENT)
Dept: PHYSICAL MEDICINE AND REHAB | Facility: MEDICAL CENTER | Age: 68
End: 2023-10-18
Payer: COMMERCIAL

## 2023-10-18 VITALS
WEIGHT: 192.02 LBS | HEART RATE: 71 BPM | BODY MASS INDEX: 34.02 KG/M2 | HEIGHT: 63 IN | TEMPERATURE: 97.2 F | DIASTOLIC BLOOD PRESSURE: 66 MMHG | OXYGEN SATURATION: 92 % | SYSTOLIC BLOOD PRESSURE: 102 MMHG

## 2023-10-18 DIAGNOSIS — G89.29 CHRONIC BILATERAL LOW BACK PAIN WITH BILATERAL SCIATICA: ICD-10-CM

## 2023-10-18 DIAGNOSIS — M54.16 LUMBAR RADICULOPATHY: ICD-10-CM

## 2023-10-18 DIAGNOSIS — M54.41 CHRONIC BILATERAL LOW BACK PAIN WITH BILATERAL SCIATICA: ICD-10-CM

## 2023-10-18 DIAGNOSIS — M54.42 CHRONIC BILATERAL LOW BACK PAIN WITH BILATERAL SCIATICA: ICD-10-CM

## 2023-10-18 PROCEDURE — 3078F DIAST BP <80 MM HG: CPT | Performed by: GENERAL PRACTICE

## 2023-10-18 PROCEDURE — 1125F AMNT PAIN NOTED PAIN PRSNT: CPT | Performed by: GENERAL PRACTICE

## 2023-10-18 PROCEDURE — 3074F SYST BP LT 130 MM HG: CPT | Performed by: GENERAL PRACTICE

## 2023-10-18 PROCEDURE — 95909 NRV CNDJ TST 5-6 STUDIES: CPT | Performed by: GENERAL PRACTICE

## 2023-10-18 PROCEDURE — 95886 MUSC TEST DONE W/N TEST COMP: CPT | Performed by: GENERAL PRACTICE

## 2023-10-18 ASSESSMENT — PAIN SCALES - GENERAL: PAINLEVEL: 4=SLIGHT-MODERATE PAIN

## 2023-10-18 ASSESSMENT — FIBROSIS 4 INDEX: FIB4 SCORE: 1.38

## 2023-10-18 ASSESSMENT — PATIENT HEALTH QUESTIONNAIRE - PHQ9: CLINICAL INTERPRETATION OF PHQ2 SCORE: 0

## 2023-10-18 NOTE — PROCEDURES
"Electromyography Report          Name: Shaunna Vieira    MRN: [id number]   YOB: 1955 (67 y.o.)   Sex: female   Height:   Vitals:    10/18/23 0915   BP: 102/66   Weight: 87.1 kg (192 lb 0.3 oz)   Height: 1.6 m (5' 3\")       Examining Physician: Sean Pierre D.O.     EMG Examination Date: 10/18/2023     Impression:      This is marginally normal study.   There is no needle EMG evidence of lumbar radiculopathy on the left.  Please note, however it radiculopathy screening via needle EMG assesses motor nerve fibers only, and does not rule out radiculopathy side effects only sensory nerve roots.  Possibility of length-dependent neuropathy but only one abnormal limb.     Nerve Conduction Studies and Electromyography  Examination Findings:    Evaluation of the left sural sensory nerve showed prolonged distal peak latency (4.2 ms) and decreased conduction velocity (Calf-Lat Mall, 33 m/s).  All remaining nerves (as indicated in the following tables) were within normal limits.      Left sural/radial ratio is greater than 0.21    All examined muscles (as indicated in the following table) showed no evidence of electrical instability.      Please see the attached document for raw data or the scanned document in EPIC.    Unless otherwise noted, the temperature of the patient was monitored continuously and remained between 32 and 36 degrees centigrade during the performance of the nerve conduction studies.    Reference values are from the [facility] normative data guidelines that are attached at the end of this document.   The study was done with a monopolar needle examination.   Reference levels were from [place] related to age.       Nerve Conduction Studies  Anti Sensory Summary Table     Stim Site NR Peak (ms) Norm Peak (ms) P-T Amp (µV) Norm P-T Amp Site1 Site2 Delta-P (ms) Dist (cm) Deangelo (m/s) Norm Deangelo (m/s)   Left Radial Anti Sensory (Base 1st Digit)   Wrist    3.0 <3.1 19.0  Wrist Base 1st Digit 3.0 " 0.0     Left Sural Anti Sensory (Lat Mall)   Calf    4.2 <4.0 18.1 >5.0 Calf Lat Mall 4.2 14.0 33 >35   Right Sural Anti Sensory (Lat Mall)   Calf    3.8 <4.0 18.9 >5.0 Calf Lat Mall 3.8 14.0 37 >35     Motor Summary Table     Stim Site NR Onset (ms) Norm Onset (ms) O-P Amp (mV) Norm O-P Amp Site1 Site2 Delta-0 (ms) Dist (cm) Deangelo (m/s) Norm Deangelo (m/s)   Left Fibular Motor (Ext Dig Brev)   Ankle    4.8 <6.1 5.7 >2.5 B Fib Ankle 5.9 28.0 47 >38   B Fib    10.7  6.0  Poplt B Fib 1.6 9.5 59 >40   Poplt    12.3  5.6          Left Tibial Motor (Abd Kohler Brev)   Ankle    4.2 <6.1 9.6 >3.0 Knee Ankle 7.1 37.0 52 >35   Knee    11.3  9.2            EMG     Side Muscle Nerve Root Ins Act Fibs Psw Amp Dur Poly Recrt Int Pat Comment   Left AntTibialis Dp Br Fibular L4-5 Nml Nml Nml Nml Nml 0 Nml Nml    Left VastusMed Femoral L2-4 Nml Nml Nml Nml Nml 0 Nml Nml    Left GluteusMax InfGluteal L5-S2 Nml Nml Nml Nml Nml 0 Nml Nml    Left GluteusMed SupGluteal L5-S1 Nml Nml Nml Nml Nml 0 Nml Nml    Left Gastroc Tibial S1-2 Nml Nml Nml Nml Nml 0 Nml Nml        Nerve Conduction Studies  Anti Sensory Left/Right Comparison     Stim Site L Lat (ms) R Lat (ms) L-R Lat (ms) L Amp (µV) R Amp (µV) L-R Amp (%) Site1 Site2 L Deangelo (m/s) R Deangelo (m/s) L-R Deangelo (m/s)   Radial Anti Sensory (Base 1st Digit)   Wrist 3.0   19.0   Wrist Base 1st Digit      Sural Anti Sensory (Lat Mall)   Calf 4.2 3.8 0.4 18.1 18.9 4.2 Calf Lat Mall 33 37 4     Motor Left/Right Comparison     Stim Site L Lat (ms) R Lat (ms) L-R Lat (ms) L Amp (mV) R Amp (mV) L-R Amp (%) Site1 Site2 L Deangelo (m/s) R Deangelo (m/s) L-R Deangelo (m/s)   Fibular Motor (Ext Dig Brev)   Ankle 4.8   5.7   B Fib Ankle 47     B Fib 10.7   6.0   Poplt B Fib 59     Poplt 12.3   5.6          Tibial Motor (Abd Kohler Brev)   Ankle 4.2   9.6   Knee Ankle 52     Knee 11.3   9.2            Waveforms:                    cpt 19376. Nerve conduction studies,  5-6 studies  CPT 42500. needle electromyography, complete, each  extremity.       Sean Pierre D.O.

## 2023-10-19 LAB
BACTERIA UR CULT: NORMAL
SIGNIFICANT IND 70042: NORMAL
SITE SITE: NORMAL
SOURCE SOURCE: NORMAL

## 2023-10-26 ENCOUNTER — OFFICE VISIT (OUTPATIENT)
Dept: PHYSICAL MEDICINE AND REHAB | Facility: MEDICAL CENTER | Age: 68
End: 2023-10-26
Payer: COMMERCIAL

## 2023-10-26 VITALS
WEIGHT: 185.85 LBS | TEMPERATURE: 96 F | DIASTOLIC BLOOD PRESSURE: 60 MMHG | SYSTOLIC BLOOD PRESSURE: 90 MMHG | BODY MASS INDEX: 32.93 KG/M2 | HEIGHT: 63 IN | HEART RATE: 69 BPM

## 2023-10-26 DIAGNOSIS — M54.16 LUMBAR RADICULOPATHY: ICD-10-CM

## 2023-10-26 PROCEDURE — 3074F SYST BP LT 130 MM HG: CPT | Performed by: GENERAL PRACTICE

## 2023-10-26 PROCEDURE — 95909 NRV CNDJ TST 5-6 STUDIES: CPT | Performed by: GENERAL PRACTICE

## 2023-10-26 PROCEDURE — 95908 NRV CNDJ TST 3-4 STUDIES: CPT | Performed by: GENERAL PRACTICE

## 2023-10-26 PROCEDURE — 95886 MUSC TEST DONE W/N TEST COMP: CPT | Performed by: GENERAL PRACTICE

## 2023-10-26 PROCEDURE — 3078F DIAST BP <80 MM HG: CPT | Performed by: GENERAL PRACTICE

## 2023-10-26 RX ORDER — MELOXICAM 15 MG/1
TABLET ORAL
COMMUNITY
Start: 2023-10-02

## 2023-10-26 ASSESSMENT — FIBROSIS 4 INDEX: FIB4 SCORE: 1.38

## 2023-10-26 NOTE — PROCEDURES
"Electromyography Report          Name: Shaunna Vieira    MRN: [id number]   YOB: 1955 (67 y.o.)   Sex: female   Height:   Vitals:    10/26/23 1531   BP: 90/60   Weight: 84.3 kg (185 lb 13.6 oz)   Height: 1.6 m (5' 3\")       Examining Physician: Sean Pierre D.O.     EMG Examination Date: 10/26/2023     Impression:      This is an abnormal study.   There is needle EMG evidence of chronic S1 lumbar radiculopathy on the right.    From the prior study, there is no needle EMG evidence of lumbar radiculopathy on the left.  Please note, however it radiculopathy screening via needle EMG assesses motor nerve fibers only, and does not rule out radiculopathy side effects only sensory nerve roots.    Nerve Conduction Studies and Electromyography  Examination Findings:    Evaluation of the right sural sensory nerve showed no response (Calf).  All remaining nerves (as indicated in the following tables) were within normal limits.      Needle evaluation of the right gluteus medius and the right gastroc muscles showed diminished recruitment.  All remaining muscles (as indicated in the following table) showed no evidence of electrical instability.      Please see the attached document for raw data or the scanned document in EPIC.    Unless otherwise noted, the temperature of the patient was monitored continuously and remained between 32 and 36 degrees centigrade during the performance of the nerve conduction studies.    Reference values are from the [facility] normative data guidelines and [place] related to age guidelines.   The study was done with a monopolar needle examination.     Nerve Conduction Studies  Anti Sensory Summary Table     Stim Site NR Peak (ms) Norm Peak (ms) P-T Amp (µV) Norm P-T Amp Site1 Site2 Delta-P (ms) Dist (cm) Deangelo (m/s) Norm Deangelo (m/s)   Right Sural Anti Sensory (Lat Mall)   Calf NR  <4.0  >5.0 Calf Lat Mall  14.0  >35     Motor Summary Table     Stim Site NR Onset (ms) Norm Onset " (ms) O-P Amp (mV) Norm O-P Amp Site1 Site2 Delta-0 (ms) Dist (cm) Deangelo (m/s) Norm Deangelo (m/s)   Right Fibular Motor (Ext Dig Brev)   Ankle    4.8 <6.1 3.7 >2.5 B Fib Ankle 5.1 28.0 55 >38   B Fib    9.9  3.5  Poplt B Fib 1.5 7.0 47 >40   Poplt    11.4  3.6          Right Tibial Motor (Abd Kohler Brev)   Ankle    4.6 <6.1 8.6 >3.0 Knee Ankle 6.3 31.0 49 >35   Knee    10.9  8.0            EMG     Side Muscle Nerve Root Ins Act Fibs Psw Amp Dur Poly Recrt Int Pat Comment   Right AntTibialis Dp Br Fibular L4-5 Nml Nml Nml Nml Nml 0 Nml Nml    Right VastusMed Femoral L2-4 Nml Nml Nml Nml Nml 0 Nml Nml    Right GluteusMax InfGluteal L5-S2 Nml Nml Nml Nml Nml 0 Nml Nml    Right GluteusMed SupGluteal L5-S1 Nml Nml Nml Nml Nml 0 Reduced Nml    Right Gastroc Tibial S1-2 Nml Nml Nml Nml Nml 0 Reduced Nml      Waveforms:           cpt 45183. Nerve conduction studies, 3-4 studies  CPT 36089. needle electromyography, complete, each extremity.       Sean Pierre D.O.

## 2023-10-28 LAB
25(OH)D3+25(OH)D2 SERPL-MCNC: 30.7 NG/ML (ref 30–100)
ALBUMIN SERPL-MCNC: 4.5 G/DL (ref 3.9–4.9)
ALBUMIN/CREAT UR: 8 MG/G CREAT (ref 0–29)
ALBUMIN/GLOB SERPL: 2 {RATIO} (ref 1.2–2.2)
ALP SERPL-CCNC: 73 IU/L (ref 44–121)
ALT SERPL-CCNC: 15 IU/L (ref 0–32)
AST SERPL-CCNC: 14 IU/L (ref 0–40)
BASOPHILS # BLD AUTO: 0 X10E3/UL (ref 0–0.2)
BASOPHILS NFR BLD AUTO: 1 %
BILIRUB SERPL-MCNC: 0.8 MG/DL (ref 0–1.2)
BUN SERPL-MCNC: 18 MG/DL (ref 8–27)
BUN/CREAT SERPL: 22 (ref 12–28)
CALCIUM SERPL-MCNC: 9.2 MG/DL (ref 8.7–10.3)
CHLORIDE SERPL-SCNC: 103 MMOL/L (ref 96–106)
CHOLEST SERPL-MCNC: 170 MG/DL (ref 100–199)
CO2 SERPL-SCNC: 24 MMOL/L (ref 20–29)
CREAT SERPL-MCNC: 0.83 MG/DL (ref 0.57–1)
CREAT UR-MCNC: 156.2 MG/DL
EGFRCR SERPLBLD CKD-EPI 2021: 77 ML/MIN/1.73
EOSINOPHIL # BLD AUTO: 0.1 X10E3/UL (ref 0–0.4)
EOSINOPHIL NFR BLD AUTO: 2 %
ERYTHROCYTE [DISTWIDTH] IN BLOOD BY AUTOMATED COUNT: 13.6 % (ref 11.7–15.4)
GLOBULIN SER CALC-MCNC: 2.3 G/DL (ref 1.5–4.5)
GLUCOSE SERPL-MCNC: 99 MG/DL (ref 70–99)
HBA1C MFR BLD: 5.9 % (ref 4.8–5.6)
HCT VFR BLD AUTO: 43.1 % (ref 34–46.6)
HDLC SERPL-MCNC: 64 MG/DL
HGB BLD-MCNC: 14.2 G/DL (ref 11.1–15.9)
IMM GRANULOCYTES # BLD AUTO: 0 X10E3/UL (ref 0–0.1)
IMM GRANULOCYTES NFR BLD AUTO: 0 %
IMMATURE CELLS  115398: NORMAL
LABORATORY COMMENT REPORT: NORMAL
LDLC SERPL CALC-MCNC: 93 MG/DL (ref 0–99)
LYMPHOCYTES # BLD AUTO: 1.4 X10E3/UL (ref 0.7–3.1)
LYMPHOCYTES NFR BLD AUTO: 27 %
MCH RBC QN AUTO: 28.7 PG (ref 26.6–33)
MCHC RBC AUTO-ENTMCNC: 32.9 G/DL (ref 31.5–35.7)
MCV RBC AUTO: 87 FL (ref 79–97)
MICROALBUMIN UR-MCNC: 12.6 UG/ML
MONOCYTES # BLD AUTO: 0.5 X10E3/UL (ref 0.1–0.9)
MONOCYTES NFR BLD AUTO: 9 %
MORPHOLOGY BLD-IMP: NORMAL
NEUTROPHILS # BLD AUTO: 3.2 X10E3/UL (ref 1.4–7)
NEUTROPHILS NFR BLD AUTO: 61 %
NRBC BLD AUTO-RTO: NORMAL %
PLATELET # BLD AUTO: 254 X10E3/UL (ref 150–450)
POTASSIUM SERPL-SCNC: 4.2 MMOL/L (ref 3.5–5.2)
PROT SERPL-MCNC: 6.8 G/DL (ref 6–8.5)
RBC # BLD AUTO: 4.94 X10E6/UL (ref 3.77–5.28)
SODIUM SERPL-SCNC: 140 MMOL/L (ref 134–144)
TRIGL SERPL-MCNC: 66 MG/DL (ref 0–149)
TSH SERPL DL<=0.005 MIU/L-ACNC: 3.43 UIU/ML (ref 0.45–4.5)
VLDLC SERPL CALC-MCNC: 13 MG/DL (ref 5–40)
WBC # BLD AUTO: 5.2 X10E3/UL (ref 3.4–10.8)

## 2023-11-03 ENCOUNTER — OFFICE VISIT (OUTPATIENT)
Dept: MEDICAL GROUP | Facility: MEDICAL CENTER | Age: 68
End: 2023-11-03
Payer: COMMERCIAL

## 2023-11-03 ENCOUNTER — APPOINTMENT (OUTPATIENT)
Dept: RADIOLOGY | Facility: MEDICAL CENTER | Age: 68
End: 2023-11-03
Attending: PHYSICAL MEDICINE & REHABILITATION
Payer: COMMERCIAL

## 2023-11-03 VITALS
SYSTOLIC BLOOD PRESSURE: 110 MMHG | OXYGEN SATURATION: 95 % | HEART RATE: 73 BPM | TEMPERATURE: 97 F | DIASTOLIC BLOOD PRESSURE: 62 MMHG

## 2023-11-03 DIAGNOSIS — M79.604 CHRONIC PAIN OF BOTH LOWER EXTREMITIES: ICD-10-CM

## 2023-11-03 DIAGNOSIS — M79.605 CHRONIC PAIN OF BOTH LOWER EXTREMITIES: ICD-10-CM

## 2023-11-03 DIAGNOSIS — M16.10 ARTHRITIS OF HIP: ICD-10-CM

## 2023-11-03 DIAGNOSIS — G89.29 CHRONIC HIP PAIN, BILATERAL: ICD-10-CM

## 2023-11-03 DIAGNOSIS — M54.41 CHRONIC BILATERAL LOW BACK PAIN WITH BILATERAL SCIATICA: ICD-10-CM

## 2023-11-03 DIAGNOSIS — G89.29 CHRONIC PAIN OF BOTH LOWER EXTREMITIES: ICD-10-CM

## 2023-11-03 DIAGNOSIS — M25.562 CHRONIC PAIN OF LEFT KNEE: ICD-10-CM

## 2023-11-03 DIAGNOSIS — G89.29 CHRONIC BILATERAL LOW BACK PAIN WITH BILATERAL SCIATICA: ICD-10-CM

## 2023-11-03 DIAGNOSIS — R31.29 OTHER MICROSCOPIC HEMATURIA: ICD-10-CM

## 2023-11-03 DIAGNOSIS — R10.2 PELVIC PRESSURE IN FEMALE: ICD-10-CM

## 2023-11-03 DIAGNOSIS — M25.551 CHRONIC HIP PAIN, BILATERAL: ICD-10-CM

## 2023-11-03 DIAGNOSIS — R30.0 DYSURIA: ICD-10-CM

## 2023-11-03 DIAGNOSIS — M54.42 CHRONIC BILATERAL LOW BACK PAIN WITH BILATERAL SCIATICA: ICD-10-CM

## 2023-11-03 DIAGNOSIS — G89.29 CHRONIC PAIN OF LEFT KNEE: ICD-10-CM

## 2023-11-03 DIAGNOSIS — M54.16 LUMBAR RADICULOPATHY: ICD-10-CM

## 2023-11-03 DIAGNOSIS — M25.552 CHRONIC HIP PAIN, BILATERAL: ICD-10-CM

## 2023-11-03 DIAGNOSIS — Z96.652 HISTORY OF LEFT KNEE REPLACEMENT: ICD-10-CM

## 2023-11-03 LAB
APPEARANCE UR: CLEAR
BILIRUB UR STRIP-MCNC: NORMAL MG/DL
COLOR UR AUTO: YELLOW
GLUCOSE UR STRIP.AUTO-MCNC: NORMAL MG/DL
KETONES UR STRIP.AUTO-MCNC: NORMAL MG/DL
LEUKOCYTE ESTERASE UR QL STRIP.AUTO: NORMAL
NITRITE UR QL STRIP.AUTO: NORMAL
PH UR STRIP.AUTO: 5.5 [PH] (ref 5–8)
PROT UR QL STRIP: NORMAL MG/DL
RBC UR QL AUTO: NORMAL
SP GR UR STRIP.AUTO: 1.01
UROBILINOGEN UR STRIP-MCNC: 0.2 MG/DL

## 2023-11-03 PROCEDURE — 3078F DIAST BP <80 MM HG: CPT | Performed by: NURSE PRACTITIONER

## 2023-11-03 PROCEDURE — 72148 MRI LUMBAR SPINE W/O DYE: CPT

## 2023-11-03 PROCEDURE — 3074F SYST BP LT 130 MM HG: CPT | Performed by: NURSE PRACTITIONER

## 2023-11-03 PROCEDURE — 81002 URINALYSIS NONAUTO W/O SCOPE: CPT | Performed by: NURSE PRACTITIONER

## 2023-11-03 PROCEDURE — 99214 OFFICE O/P EST MOD 30 MIN: CPT | Performed by: NURSE PRACTITIONER

## 2023-11-03 NOTE — PROGRESS NOTES
Subjective:   Shaunna Vieira is a 68 y.o. female here today for dysuria, pelvic pressure    Dysuria  Started over the past few weeks, intermittent. Will have a period of 4 hours at a time of a constant pressure, in her pelvis/bladder area.     Was seen by Urgent care 2.5 weeks ago, diagnosed with Pyelonephritis, treated with keflex x 5 days. Urine culture negative. No change in symptoms.     Repeat UA today shows continued trace intact. Does report that she noticed a little blood when wiping.        Current medicines (including changes today)  Current Outpatient Medications   Medication Sig Dispense Refill    meloxicam (MOBIC) 15 MG tablet       clindamycin (CLEOCIN) 1 % Solution APPLY 1 TO 2 TIMES DAILY TO AFFECTED AREAS 60 mL 11    atorvastatin (LIPITOR) 10 MG Tab TAKE 1 TABLET EVERY EVENING 90 Tablet 3    ketoconazole (NIZORAL) 2 % Cream APPLY 1 APPLICATION TOPICALLY TWO TIMES A DAY AS NEEDED FOR RASH 30 g 23    meloxicam (MOBIC) 7.5 MG Tab Take 1 Tablet by mouth 1 time a day as needed for Moderate Pain or Inflammation. 90 Tablet 1    Levocetirizine Dihydrochloride 5 MG Tab Take  by mouth every day.      PROCTOSOL HC 2.5 % Cream APPLY 1 PEASIZED AMOUNT TOPICALLY TO AFFECTED AREA TWICE DAILY FOR TWO WEEKS      triamcinolone acetonide (KENALOG) 0.1 % Cream Apply  to affected area(s) as needed.      fluticasone (FLONASE) 50 MCG/ACT nasal spray USE 2 SPRAYS IN EACH NOSTRIL DAILY AS NEEDED 16 g 3    mupirocin (BACTROBAN) 2 % OINT Apply  to affected area(s) as needed.      Glucosamine-Chondroit-Vit C-Mn (GLUCOSAMINE CHONDR 1500 COMPLX PO) Take  by mouth every day.      Multiple Vitamin (MULTI-VITAMIN DAILY PO) Take  by mouth.       No current facility-administered medications for this visit.     She  has a past medical history of Abnormal uterine bleeding, Abscess, Acne, Adverse effect of anesthesia, Anesthesia, Breast mass, right, Colon polyps, Dermatitis, Elevated random blood glucose level, Elevated  transaminase level, Environmental and seasonal allergies, GERD (gastroesophageal reflux disease), Hyperlipidemia, Migraine, Obesity, Osteoarthritis, Pain (04/19/2018), Snoring, Tinnitus, and Vitamin D deficiency.    ROS   No chest pain, no shortness of breath, no abdominal pain  Positive ROS as per HPI.  All other systems reviewed and are negative.     Objective:     /62   Pulse 73   Temp 36.1 °C (97 °F) (Temporal)   SpO2 95%  There is no height or weight on file to calculate BMI.     Physical Exam:  Constitutional: Alert, no distress.  Skin: Warm, dry, good turgor, no rashes in visible areas.  Eye: Equal, round and reactive, conjunctiva clear, lids normal.  ENMT: Lips without lesions, good dentition  Respiratory: Unlabored respiratory effort  Abdomen: Soft, non-tender  Psych: Alert and oriented x3, normal affect and mood.        Assessment and Plan:   The following treatment plan was discussed    1. Pelvic pressure in female  Unstable  Continued hematuria with negative urine culture  DDX includes nephrolith vs uterine etiology ie fibroid  CT urogram to eval urinary tract and pelvic organs, recent renal function normal  - CT-ABDOMEN & PELVIS UROGRAM; Future    2. Dysuria  Unstable  UA continues to be positive for trace blood  As above  - POCT Urinalysis  - CT-ABDOMEN & PELVIS UROGRAM; Future    3. Other microscopic hematuria  - CT-ABDOMEN & PELVIS UROGRAM; Future      Followup: Return in about 2 weeks (around 11/17/2023) for Results Review.    The MA is performing the above orders under the direction of Dr. Lizama    Please note that this dictation was created using voice recognition software. I have made every reasonable attempt to correct obvious errors, but I expect that there are errors of grammar and possibly content that I did not discover before finalizing the note.

## 2023-11-03 NOTE — ASSESSMENT & PLAN NOTE
Started over the past few weeks, intermittent. Will have a period of 4 hours at a time of a constant pressure, in her pelvis/bladder area.     Was seen by Urgent care 2.5 weeks ago, diagnosed with Pyelonephritis, treated with keflex x 5 days. Urine culture negative. No change in symptoms.     Repeat UA today shows continued trace intact. Does report that she noticed a little blood when wiping.

## 2023-11-05 ENCOUNTER — HOSPITAL ENCOUNTER (OUTPATIENT)
Dept: RADIOLOGY | Facility: MEDICAL CENTER | Age: 68
End: 2023-11-05
Attending: NURSE PRACTITIONER
Payer: COMMERCIAL

## 2023-11-05 DIAGNOSIS — R31.29 OTHER MICROSCOPIC HEMATURIA: ICD-10-CM

## 2023-11-05 DIAGNOSIS — R10.2 PELVIC PRESSURE IN FEMALE: ICD-10-CM

## 2023-11-05 DIAGNOSIS — R30.0 DYSURIA: ICD-10-CM

## 2023-11-05 PROCEDURE — 74178 CT ABD&PLV WO CNTR FLWD CNTR: CPT

## 2023-11-05 PROCEDURE — 700117 HCHG RX CONTRAST REV CODE 255: Performed by: NURSE PRACTITIONER

## 2023-11-05 RX ADMIN — IOHEXOL 95 ML: 350 INJECTION, SOLUTION INTRAVENOUS at 10:41

## 2023-11-08 ENCOUNTER — OFFICE VISIT (OUTPATIENT)
Dept: PHYSICAL MEDICINE AND REHAB | Facility: MEDICAL CENTER | Age: 68
End: 2023-11-08
Payer: COMMERCIAL

## 2023-11-08 VITALS
HEART RATE: 80 BPM | DIASTOLIC BLOOD PRESSURE: 60 MMHG | BODY MASS INDEX: 33.99 KG/M2 | HEIGHT: 62 IN | OXYGEN SATURATION: 93 % | TEMPERATURE: 97 F | SYSTOLIC BLOOD PRESSURE: 100 MMHG

## 2023-11-08 DIAGNOSIS — M79.605 CHRONIC PAIN OF BOTH LOWER EXTREMITIES: ICD-10-CM

## 2023-11-08 DIAGNOSIS — M79.604 CHRONIC PAIN OF BOTH LOWER EXTREMITIES: ICD-10-CM

## 2023-11-08 DIAGNOSIS — M54.42 CHRONIC BILATERAL LOW BACK PAIN WITH BILATERAL SCIATICA: ICD-10-CM

## 2023-11-08 DIAGNOSIS — M54.16 LUMBAR RADICULOPATHY: ICD-10-CM

## 2023-11-08 DIAGNOSIS — G89.29 CHRONIC PAIN OF BOTH LOWER EXTREMITIES: ICD-10-CM

## 2023-11-08 DIAGNOSIS — M54.41 CHRONIC BILATERAL LOW BACK PAIN WITH BILATERAL SCIATICA: ICD-10-CM

## 2023-11-08 DIAGNOSIS — M16.10 ARTHRITIS OF HIP: ICD-10-CM

## 2023-11-08 DIAGNOSIS — G89.29 CHRONIC BILATERAL LOW BACK PAIN WITH BILATERAL SCIATICA: ICD-10-CM

## 2023-11-08 DIAGNOSIS — M48.04 THORACIC SPINAL STENOSIS: ICD-10-CM

## 2023-11-08 PROCEDURE — 3074F SYST BP LT 130 MM HG: CPT | Performed by: PHYSICAL MEDICINE & REHABILITATION

## 2023-11-08 PROCEDURE — 3078F DIAST BP <80 MM HG: CPT | Performed by: PHYSICAL MEDICINE & REHABILITATION

## 2023-11-08 PROCEDURE — 99214 OFFICE O/P EST MOD 30 MIN: CPT | Performed by: PHYSICAL MEDICINE & REHABILITATION

## 2023-11-08 ASSESSMENT — PATIENT HEALTH QUESTIONNAIRE - PHQ9
SUM OF ALL RESPONSES TO PHQ QUESTIONS 1-9: 24
CLINICAL INTERPRETATION OF PHQ2 SCORE: 6
5. POOR APPETITE OR OVEREATING: 3 - NEARLY EVERY DAY

## 2023-11-08 NOTE — PROGRESS NOTES
"Follow up patient note  Interventional spine and Pain  Physiatry (physical medicine and  Rehabilitation)       Chief complaint:   Chief Complaint   Patient presents with    Follow-Up     Chronic bilateral low back pain with bilateral sciatica            HISTORY    Please see new patient note by Dr Wong,  for more details.     HPI  Patient identification: Shaunna Vieira ,  1955,   With Diagnoses of severe Arthritis of hip, Chronic pain of both lower extremities, Lumbar radiculopathy, Chronic bilateral low back pain with bilateral sciatica, and severe T10-11 Thoracic spinal stenosis were pertinent to this visit.     Chronic severe bilateral hip pain with decreased range of motion aching quality present for many years gradually worsening.  Also with bilateral thoracic and low back pain typically nonradiating.  These pains are severe in intensity, still present worsening.       ROS Red Flags :   Fever, Chills, Sweats: Denies  Involuntary Weight Loss: Denies  Bowel/Bladder Incontinence: Denies  Saddle Anesthesia: Denies        PMHx:   Past Medical History:   Diagnosis Date    Abnormal uterine bleeding     Abscess     skin rashes, ulcerations    Acne     Adverse effect of anesthesia     prolonged \"fog\"    Anesthesia     confused and drowsy for a week afterwards    Breast mass, right     Colon polyps     Dermatitis     Elevated random blood glucose level     Elevated transaminase level     Environmental and seasonal allergies     GERD (gastroesophageal reflux disease)     Hyperlipidemia     Migraine     Obesity     Osteoarthritis     Pain 2018    left knee and hip, 0/10    Snoring     no sleep  study    Tinnitus     Vitamin D deficiency        PSHx:   Past Surgical History:   Procedure Laterality Date    PB TOTAL KNEE ARTHROPLASTY Left 2022    Procedure: LEFT ARTHROPLASTY, KNEE, TOTAL;  Surgeon: Pollo Brooks M.D.;  Location: SURGERY [facility];  Service: Orthopedics    HYSTEROSCOPY " WITH MYOSURE  5/4/2018    Procedure: HYSTEROSCOPY WITH MYOSURE/ WITH POLYPECTOMY;  Surgeon: Lise Perrin M.D.;  Location: SURGERY SAME DAY [facility];  Service: Gynecology    DILATION AND CURETTAGE  5/4/2018    Procedure: DILATION AND CURETTAGE;  Surgeon: Lise Perrin M.D.;  Location: SURGERY SAME DAY [facility];  Service: Gynecology    KNEE ARTHROSCOPY Left 7/17/2015    Procedure: KNEE ARTHROSCOPY;  Surgeon: Fantasma Dominique M.D.;  Location: SURGERY [facility];  Service:     MENISCECTOMY Left 7/17/2015    Procedure: MENISCECTOMY PARTIAL LATERAL;  Surgeon: Fantasma Dominique M.D.;  Location: SURGERY [facility];  Service:     SYNOVECTOMY Left 7/17/2015    Procedure: SYNOVECTOMY;  Surgeon: Fantasma Dominique M.D.;  Location: SURGERY [facility];  Service:     OTHER  2015    colonoscopy    BREAST BIOPSY         Family history   Family History   Problem Relation Age of Onset    Other Father         PARKINSON'S    Cancer Father         lymphoma    Diabetes Father     Breast Cancer Mother     Other Mother         BLINDNESS/MAC DEG/SOKER    Arthritis Mother     Cancer Mother     Stroke Mother         TIA    Arthritis Brother     Other Other         ASBERGERS    Cancer Maternal Grandmother         asbestos         Medications:   Outpatient Medications Marked as Taking for the 11/8/23 encounter (Office Visit) with Kd Wong M.D.   Medication Sig Dispense Refill    meloxicam (MOBIC) 15 MG tablet       clindamycin (CLEOCIN) 1 % Solution APPLY 1 TO 2 TIMES DAILY TO AFFECTED AREAS 60 mL 11    atorvastatin (LIPITOR) 10 MG Tab TAKE 1 TABLET EVERY EVENING 90 Tablet 3    ketoconazole (NIZORAL) 2 % Cream APPLY 1 APPLICATION TOPICALLY TWO TIMES A DAY AS NEEDED FOR RASH 30 g 23    meloxicam (MOBIC) 7.5 MG Tab Take 1 Tablet by mouth 1 time a day as needed for Moderate Pain or Inflammation. 90 Tablet 1    Levocetirizine Dihydrochloride 5 MG Tab Take  by mouth every day.      PROCTOSOL HC 2.5 % Cream  APPLY 1 PEASIZED AMOUNT TOPICALLY TO AFFECTED AREA TWICE DAILY FOR TWO WEEKS      triamcinolone acetonide (KENALOG) 0.1 % Cream Apply  to affected area(s) as needed.      fluticasone (FLONASE) 50 MCG/ACT nasal spray USE 2 SPRAYS IN EACH NOSTRIL DAILY AS NEEDED 16 g 3    mupirocin (BACTROBAN) 2 % OINT Apply  to affected area(s) as needed.      Glucosamine-Chondroit-Vit C-Mn (GLUCOSAMINE CHONDR 1500 COMPLX PO) Take  by mouth every day.      Multiple Vitamin (MULTI-VITAMIN DAILY PO) Take  by mouth.          Current Outpatient Medications on File Prior to Visit   Medication Sig Dispense Refill    meloxicam (MOBIC) 15 MG tablet       clindamycin (CLEOCIN) 1 % Solution APPLY 1 TO 2 TIMES DAILY TO AFFECTED AREAS 60 mL 11    atorvastatin (LIPITOR) 10 MG Tab TAKE 1 TABLET EVERY EVENING 90 Tablet 3    ketoconazole (NIZORAL) 2 % Cream APPLY 1 APPLICATION TOPICALLY TWO TIMES A DAY AS NEEDED FOR RASH 30 g 23    meloxicam (MOBIC) 7.5 MG Tab Take 1 Tablet by mouth 1 time a day as needed for Moderate Pain or Inflammation. 90 Tablet 1    Levocetirizine Dihydrochloride 5 MG Tab Take  by mouth every day.      PROCTOSOL HC 2.5 % Cream APPLY 1 PEASIZED AMOUNT TOPICALLY TO AFFECTED AREA TWICE DAILY FOR TWO WEEKS      triamcinolone acetonide (KENALOG) 0.1 % Cream Apply  to affected area(s) as needed.      fluticasone (FLONASE) 50 MCG/ACT nasal spray USE 2 SPRAYS IN EACH NOSTRIL DAILY AS NEEDED 16 g 3    mupirocin (BACTROBAN) 2 % OINT Apply  to affected area(s) as needed.      Glucosamine-Chondroit-Vit C-Mn (GLUCOSAMINE CHONDR 1500 COMPLX PO) Take  by mouth every day.      Multiple Vitamin (MULTI-VITAMIN DAILY PO) Take  by mouth.       No current facility-administered medications on file prior to visit.         Allergies:   Allergies   Allergen Reactions    Morphine Shortness of Breath     Also get hallucinations    Nystatin Rash     .    Prilosec [Omeprazole]     Sulfa Drugs Unspecified     Pt doesn't know what happens, had as a child     "Pravastatin Myalgia    Bio-35 Gluten-Free [Actical] Anxiety     Causes problems with gut undiagnosed    [place] Unspecified     Positive skin test    Lanolin Unspecified     Positive skin test       Social Hx:   Social History     Socioeconomic History    Marital status:      Spouse name: Not on file    Number of children: Not on file    Years of education: Not on file    Highest education level: Not on file   Occupational History    Not on file   Tobacco Use    Smoking status: Never    Smokeless tobacco: Never   Substance and Sexual Activity    Alcohol use: Yes     Comment: 1 per month    Drug use: No    Sexual activity: Never   Other Topics Concern    Not on file   Social History Narrative    Not on file     Social Determinants of Health     Financial Resource Strain: Not on file   Food Insecurity: Not on file   Transportation Needs: Not on file   Physical Activity: Not on file   Stress: Not on file   Social Connections: Not on file   Intimate Partner Violence: Not on file   Housing Stability: Not on file         EXAMINATION     Physical Exam:   Vitals: /60 (BP Location: Right arm, Patient Position: Sitting, BP Cuff Size: Adult)   Pulse 80   Temp 36.1 °C (97 °F) (Temporal)   Ht 1.575 m (5' 2\")   SpO2 93%     Constitutional:   Body Habitus: Body mass index is 33.99 kg/m².  Cooperation: Fully cooperates with exam  Appearance: Well-groomed no disheveled    Respiratory-  breathing comfortable on room air, no audible wheezing  Cardiovascular- capillary refills less than 2 seconds. No lower extremity edema is noted.   Psychiatric- alert and oriented ×3. Normal affect.    MSK and Neuro: -  From the previous visit    \"Thoracic/Lumbar Spine/Sacral Spine/Hips   Inspection: No evidence of atrophy in bilateral lower extremities throughout      ROM: decreased active range of motion with flexion, lateral flexion, and rotation bilaterally.   There is decreased active range of motion with lumbar extension with " "pain.     There is pain with facet loading maneuver (extension rotation) with axial low back pain on the LEFT side(s)     Palpation:   No tenderness to palpation in midline at T1-T12 levels. No tenderness to palpation in the left and right of the midline T1-L5, NEGATIVE for tenderness to palpation to the para-midline region in the lower lumbar levels.  palpation over SI joint: negative bilaterally    palpation in hip or over the gluteus medius tendon insertion: negative bilaterally       Lumbar spine Special tests  Neuro tension  Straight leg test negative right, positive left    Slump test negative right, positive left       HIP  FAIR test positive bilaterally    Range of motion in the RIGHT hip is decreased in flexion, extension, abduction, internal rotation, external rotation.  Range of motion in the LEFT hip is decreased in flexion, extension, abduction, internal rotation, external rotation.        SI joint tests  Observation patient sits on one buttocks: Negative  SI joint compression negative right, positive left    SI joint distraction negative right, positive left    Thigh thrust test negative right, positive left    MAG test negative right, positive left                        Key points for the international standards for neurological classification of spinal cord injury (ISNCSCI) to light touch.      Dermatome R L                                                           L2 2 2   L3 2 2   L4 2 2   L5 2 2   S1 2 2   S2 2 2         Motor Exam Lower Extremities     ? Myotome R L   Hip flexion L2 5 5   Knee extension L3 5 5   Ankle dorsiflexion L4 5 4+   Toe extension L5 5 4   Ankle plantarflexion S1 5 4+            Reflexes  Clonus of the ankle negative bilaterally \"        MEDICAL DECISION MAKING    DATA    Labs:   Lab Results   Component Value Date/Time    SODIUM 140 10/27/2023 05:21 AM    SODIUM 137 06/28/2022 11:17 AM    POTASSIUM 4.2 10/27/2023 05:21 AM    POTASSIUM 4.1 06/28/2022 11:17 AM    CHLORIDE " "103 10/27/2023 05:21 AM    CHLORIDE 100 06/28/2022 11:17 AM    CO2 24 10/27/2023 05:21 AM    CO2 27 06/28/2022 11:17 AM    GLUCOSE 99 10/27/2023 05:21 AM    GLUCOSE 99 06/28/2022 11:17 AM    BUN 18 10/27/2023 05:21 AM    BUN 13 06/28/2022 11:17 AM    CREATININE 0.83 10/27/2023 05:21 AM    CREATININE 0.75 06/28/2022 11:17 AM    BUNCREATRAT 22 10/27/2023 05:21 AM        No results found for: \"PROTHROMBTM\", \"INR\"     Lab Results   Component Value Date/Time    WBC 5.2 10/27/2023 05:21 AM    WBC 5.2 06/28/2022 11:17 AM    RBC 4.94 10/27/2023 05:21 AM    RBC 4.85 06/28/2022 11:17 AM    HEMOGLOBIN 14.2 10/27/2023 05:21 AM    HEMOGLOBIN 14.2 06/28/2022 11:17 AM    HEMATOCRIT 43.1 10/27/2023 05:21 AM    HEMATOCRIT 42.1 06/28/2022 11:17 AM    MCV 87 10/27/2023 05:21 AM    MCV 86.8 06/28/2022 11:17 AM    MCH 28.7 10/27/2023 05:21 AM    MCH 29.3 06/28/2022 11:17 AM    MCHC 32.9 10/27/2023 05:21 AM    MCHC 33.7 06/28/2022 11:17 AM    MPV 10.2 06/28/2022 11:17 AM    NEUTSPOLYS 61 10/27/2023 05:21 AM    NEUTSPOLYS 57.60 04/19/2018 03:55 PM    LYMPHOCYTES 27 10/27/2023 05:21 AM    LYMPHOCYTES 29.90 04/19/2018 03:55 PM    MONOCYTES 9 10/27/2023 05:21 AM    MONOCYTES 9.10 04/19/2018 03:55 PM    EOSINOPHILS 2 10/27/2023 05:21 AM    EOSINOPHILS 2.30 04/19/2018 03:55 PM    BASOPHILS 1 10/27/2023 05:21 AM    BASOPHILS 0.90 04/19/2018 03:55 PM        Lab Results   Component Value Date/Time    HBA1C 5.9 (H) 10/27/2023 05:21 AM          Imaging:   I personally reviewed following images            I reviewed the following radiology reports          Xray hips 10/11/2023  IMPRESSION:   1.  There is severe bilateral hip joint osteoarthritis, left greater than right.      Results for orders placed in visit on 11/03/23    MR-LUMBAR SPINE-W/O    Impression  1.  Multifocal degenerative disease in the lumbar and visualized lower thoracic thoracic spine as described above.  2.  Severe central canal stenosis with cord impingement at the level of " T10-11.  3.  Complicated cyst in the right kidney.  4.  Gallstones                                                                                            Results for orders placed during the hospital encounter of 07/05/10    DX-CHEST-2 VIEWS         Results for orders placed during the hospital encounter of 07/06/05    DX-FOOT-COMPLETE 3+    Impression  IMPRESSION:    1. MILD GREAT TOE METATARSOPHALANGEAL JOINT OSTEOARTHRITIS.  2. WELL-DEFINED PLANTAR HEEL SPUR WITH NO EVIDENCE OF INFLAMMATORY  PROCESS.      Read By CLEMENT ANG MD on Jul 6 2005  3:36PM  : ILEANA Transcription Date: Jul 7 2005  3:22AM  THIS DOCUMENT HAS BEEN ELECTRONICALLY SIGNED BY: CLEMENT ANG MD on  Jul 7 2005 10:04AM        Results for orders placed during the hospital encounter of 09/18/08    DX-HIP-COMPLETE - UNILATERAL 2+    Impression  IMPRESSION:    NO RADIOGRAPHIC EVIDENCE OF ACUTE OR SUBACUTE TRAUMATIC INJURY AND  MARGINAL OSTEOPHYTE FORMATION IS NOT SEEN.      TJS/bht    Read By MENA COYLE MD on Sep 18 2008  4:10PM  : BHT Transcription Date: Sep 19 2008  3:04PM  THIS DOCUMENT HAS BEEN ELECTRONICALLY SIGNED BY: MENA COYLE MD on  Sep 20 2008  5:31PM     Results for orders placed during the hospital encounter of 07/12/22    DX-KNEE 2- LEFT    Impression  1. Status post left knee replacement without evidence of hardware complication.   Results for orders placed in visit on 07/07/23    DX-KNEE 3 VIEWS LEFT    Impression  TKA is unremarkable. Normal exam.    Results for orders placed in visit on 05/19/22    DX-KNEE COMPLETE 4+ LEFT   Results for orders placed in visit on 10/11/23    DX-LUMBAR SPINE-2 OR 3 VIEWS    Impression  1.  There is a dextroconvex curvature of the lumbar spine.  2.  There is degenerative disc disease with endplate spurring at the L2-3 through L4-5 levels predominantly.  3.  There is facet arthropathy in the lower 3 levels of the lumbar spine.                 Results for orders placed in visit on 23    DX-TIBIA AND FIBULA LEFT    Impression  Normal exam           ELECTRODIAGNOSTICS  EMG/nerve conduction study performed on   10/18/2023 done by Dr. Pierre  This is marginally normal study.   1. There is no needle EMG evidence of lumbar radiculopathy on the left.  Please note, however it radiculopathy screening via needle EMG assesses motor nerve fibers only, and does not rule out radiculopathy side effects only sensory nerve roots.  2. Possibility of length-dependent neuropathy but only one abnormal limb.     10/26/2023 done by Dr. Pierre  This is an abnormal study.   1. There is needle EMG evidence of chronic S1 lumbar radiculopathy on the right.    2. From the prior study, there is no needle EMG evidence of lumbar radiculopathy on the left.  Please note, however it radiculopathy screening via needle EMG assesses motor nerve fibers only, and does not rule out radiculopathy side effects only sensory nerve roots.    DIAGNOSIS   Visit Diagnoses     ICD-10-CM   1. severe Arthritis of hip  M16.10   2. Chronic pain of both lower extremities  M79.604    M79.605    G89.29   3. Lumbar radiculopathy  M54.16   4. Chronic bilateral low back pain with bilateral sciatica  M54.42    M54.41    G89.29   5. severe T10-11 Thoracic spinal stenosis  M48.04         ASSESSMENT and PLAN:     Shaunna Vieira  1955 female      Shaunna was seen today for follow-up.    Diagnoses and all orders for this visit:    severe Arthritis of hip  -     Referral to Orthopedics    Chronic pain of both lower extremities    Lumbar radiculopathy  -     MR-THORACIC SPINE-W/O; Future  -     Referral to Spine Surgery    Chronic bilateral low back pain with bilateral sciatica  -     MR-THORACIC SPINE-W/O; Future  -     Referral to Spine Surgery    severe T10-11 Thoracic spinal stenosis  -     MR-THORACIC SPINE-W/O; Future  -     Referral to Spine Surgery      There is severe arthritis in the  bilateral hips.  I referred the patient to her joint replacement surgeon Dr. Pollo Brooks for further evaluation.    Also there was severe spinal stenosis seen at T10-11 which is the top of the MRI of the lumbar spine.  I have ordered a dedicated MRI of the thoracic spine for further evaluation and referral to spine surgeon for consultation.    The patient has no red flag signs on today's exam.  Specifically the patient has no saddle anesthesia, bowel incontinence, bladder incontinence. The patient understands emergency precautions.     Medications: Continue meloxicam during flares of pain    Follow up: After the above consultations and imaging studies    Thank you for allowing me to participate in the care of this patient. If you have any questions please not hesitate to contact me.             Please note that this dictation was created using voice recognition software. I have made every reasonable attempt to correct obvious errors but there may be errors of grammar and content that I may have overlooked prior to finalization of this note.      Kd Wong MD  Interventional Spine and Sports Physiatry  Physical Medicine and Rehabilitation  [facility]

## 2023-11-15 SDOH — ECONOMIC STABILITY: HOUSING INSECURITY: IN THE LAST 12 MONTHS, HOW MANY PLACES HAVE YOU LIVED?: 1

## 2023-11-15 SDOH — ECONOMIC STABILITY: FOOD INSECURITY: WITHIN THE PAST 12 MONTHS, YOU WORRIED THAT YOUR FOOD WOULD RUN OUT BEFORE YOU GOT MONEY TO BUY MORE.: NEVER TRUE

## 2023-11-15 SDOH — ECONOMIC STABILITY: INCOME INSECURITY: HOW HARD IS IT FOR YOU TO PAY FOR THE VERY BASICS LIKE FOOD, HOUSING, MEDICAL CARE, AND HEATING?: NOT VERY HARD

## 2023-11-15 SDOH — ECONOMIC STABILITY: HOUSING INSECURITY
IN THE LAST 12 MONTHS, WAS THERE A TIME WHEN YOU DID NOT HAVE A STEADY PLACE TO SLEEP OR SLEPT IN A SHELTER (INCLUDING NOW)?: NO

## 2023-11-15 SDOH — ECONOMIC STABILITY: INCOME INSECURITY: IN THE LAST 12 MONTHS, WAS THERE A TIME WHEN YOU WERE NOT ABLE TO PAY THE MORTGAGE OR RENT ON TIME?: NO

## 2023-11-15 SDOH — ECONOMIC STABILITY: FOOD INSECURITY: WITHIN THE PAST 12 MONTHS, THE FOOD YOU BOUGHT JUST DIDN'T LAST AND YOU DIDN'T HAVE MONEY TO GET MORE.: NEVER TRUE

## 2023-11-15 SDOH — ECONOMIC STABILITY: TRANSPORTATION INSECURITY
IN THE PAST 12 MONTHS, HAS LACK OF RELIABLE TRANSPORTATION KEPT YOU FROM MEDICAL APPOINTMENTS, MEETINGS, WORK OR FROM GETTING THINGS NEEDED FOR DAILY LIVING?: YES

## 2023-11-15 SDOH — HEALTH STABILITY: PHYSICAL HEALTH: ON AVERAGE, HOW MANY MINUTES DO YOU ENGAGE IN EXERCISE AT THIS LEVEL?: 20 MIN

## 2023-11-15 SDOH — HEALTH STABILITY: MENTAL HEALTH
STRESS IS WHEN SOMEONE FEELS TENSE, NERVOUS, ANXIOUS, OR CAN'T SLEEP AT NIGHT BECAUSE THEIR MIND IS TROUBLED. HOW STRESSED ARE YOU?: VERY MUCH

## 2023-11-15 SDOH — ECONOMIC STABILITY: TRANSPORTATION INSECURITY
IN THE PAST 12 MONTHS, HAS LACK OF TRANSPORTATION KEPT YOU FROM MEETINGS, WORK, OR FROM GETTING THINGS NEEDED FOR DAILY LIVING?: YES

## 2023-11-15 SDOH — ECONOMIC STABILITY: TRANSPORTATION INSECURITY
IN THE PAST 12 MONTHS, HAS THE LACK OF TRANSPORTATION KEPT YOU FROM MEDICAL APPOINTMENTS OR FROM GETTING MEDICATIONS?: YES

## 2023-11-15 SDOH — HEALTH STABILITY: PHYSICAL HEALTH: ON AVERAGE, HOW MANY DAYS PER WEEK DO YOU ENGAGE IN MODERATE TO STRENUOUS EXERCISE (LIKE A BRISK WALK)?: 2 DAYS

## 2023-11-15 ASSESSMENT — SOCIAL DETERMINANTS OF HEALTH (SDOH)
HOW OFTEN DO YOU ATTEND CHURCH OR RELIGIOUS SERVICES?: 1 TO 4 TIMES PER YEAR
WITHIN THE PAST 12 MONTHS, YOU WORRIED THAT YOUR FOOD WOULD RUN OUT BEFORE YOU GOT THE MONEY TO BUY MORE: NEVER TRUE
IN A TYPICAL WEEK, HOW MANY TIMES DO YOU TALK ON THE PHONE WITH FAMILY, FRIENDS, OR NEIGHBORS?: MORE THAN THREE TIMES A WEEK
HOW OFTEN DO YOU ATTENT MEETINGS OF THE CLUB OR ORGANIZATION YOU BELONG TO?: 1 TO 4 TIMES PER YEAR
HOW OFTEN DO YOU GET TOGETHER WITH FRIENDS OR RELATIVES?: ONCE A WEEK
HOW HARD IS IT FOR YOU TO PAY FOR THE VERY BASICS LIKE FOOD, HOUSING, MEDICAL CARE, AND HEATING?: NOT VERY HARD
HOW MANY DRINKS CONTAINING ALCOHOL DO YOU HAVE ON A TYPICAL DAY WHEN YOU ARE DRINKING: 1 OR 2
HOW OFTEN DO YOU GET TOGETHER WITH FRIENDS OR RELATIVES?: ONCE A WEEK
DO YOU BELONG TO ANY CLUBS OR ORGANIZATIONS SUCH AS CHURCH GROUPS UNIONS, FRATERNAL OR ATHLETIC GROUPS, OR SCHOOL GROUPS?: YES
DO YOU BELONG TO ANY CLUBS OR ORGANIZATIONS SUCH AS CHURCH GROUPS UNIONS, FRATERNAL OR ATHLETIC GROUPS, OR SCHOOL GROUPS?: YES
IN A TYPICAL WEEK, HOW MANY TIMES DO YOU TALK ON THE PHONE WITH FAMILY, FRIENDS, OR NEIGHBORS?: MORE THAN THREE TIMES A WEEK
HOW OFTEN DO YOU HAVE SIX OR MORE DRINKS ON ONE OCCASION: NEVER
HOW OFTEN DO YOU ATTEND CHURCH OR RELIGIOUS SERVICES?: 1 TO 4 TIMES PER YEAR
HOW OFTEN DO YOU HAVE A DRINK CONTAINING ALCOHOL: MONTHLY OR LESS
HOW OFTEN DO YOU ATTENT MEETINGS OF THE CLUB OR ORGANIZATION YOU BELONG TO?: 1 TO 4 TIMES PER YEAR

## 2023-11-15 ASSESSMENT — LIFESTYLE VARIABLES
HOW MANY STANDARD DRINKS CONTAINING ALCOHOL DO YOU HAVE ON A TYPICAL DAY: 1 OR 2
AUDIT-C TOTAL SCORE: 1
SKIP TO QUESTIONS 9-10: 1
HOW OFTEN DO YOU HAVE SIX OR MORE DRINKS ON ONE OCCASION: NEVER
HOW OFTEN DO YOU HAVE A DRINK CONTAINING ALCOHOL: MONTHLY OR LESS

## 2023-11-17 ENCOUNTER — OFFICE VISIT (OUTPATIENT)
Dept: MEDICAL GROUP | Facility: MEDICAL CENTER | Age: 68
End: 2023-11-17
Payer: COMMERCIAL

## 2023-11-17 VITALS
SYSTOLIC BLOOD PRESSURE: 110 MMHG | TEMPERATURE: 98 F | HEART RATE: 76 BPM | OXYGEN SATURATION: 94 % | DIASTOLIC BLOOD PRESSURE: 60 MMHG

## 2023-11-17 DIAGNOSIS — R73.03 PREDIABETES: ICD-10-CM

## 2023-11-17 DIAGNOSIS — M16.0 BILATERAL HIP JOINT ARTHRITIS: ICD-10-CM

## 2023-11-17 DIAGNOSIS — M25.551 BILATERAL HIP PAIN: ICD-10-CM

## 2023-11-17 DIAGNOSIS — M25.552 BILATERAL HIP PAIN: ICD-10-CM

## 2023-11-17 PROBLEM — J06.9 VIRAL UPPER RESPIRATORY TRACT INFECTION: Status: RESOLVED | Noted: 2019-02-20 | Resolved: 2023-11-17

## 2023-11-17 PROCEDURE — 3078F DIAST BP <80 MM HG: CPT | Performed by: NURSE PRACTITIONER

## 2023-11-17 PROCEDURE — 3074F SYST BP LT 130 MM HG: CPT | Performed by: NURSE PRACTITIONER

## 2023-11-17 PROCEDURE — 99214 OFFICE O/P EST MOD 30 MIN: CPT | Performed by: NURSE PRACTITIONER

## 2023-11-17 NOTE — PROGRESS NOTES
Subjective:   Shaunna Vieira is a 68 y.o. female here today for follow up, lab review    Prediabetes  Chronic. A1C worse at 5.9 on recent labs. Working on diet, exercise, weight loss.     Bilateral hip pain  Chronic, worsening. XR shows severe bilateral OA, L>R. Referred to ortho, would like to see group other than [facility]. Considering replacement soon. Taking meloxicam as needed, 7.5-15 mg daily.      Current medicines (including changes today)  Current Outpatient Medications   Medication Sig Dispense Refill    meloxicam (MOBIC) 15 MG tablet       clindamycin (CLEOCIN) 1 % Solution APPLY 1 TO 2 TIMES DAILY TO AFFECTED AREAS 60 mL 11    atorvastatin (LIPITOR) 10 MG Tab TAKE 1 TABLET EVERY EVENING 90 Tablet 3    ketoconazole (NIZORAL) 2 % Cream APPLY 1 APPLICATION TOPICALLY TWO TIMES A DAY AS NEEDED FOR RASH 30 g 23    meloxicam (MOBIC) 7.5 MG Tab Take 1 Tablet by mouth 1 time a day as needed for Moderate Pain or Inflammation. 90 Tablet 1    Levocetirizine Dihydrochloride 5 MG Tab Take  by mouth every day.      PROCTOSOL HC 2.5 % Cream APPLY 1 PEASIZED AMOUNT TOPICALLY TO AFFECTED AREA TWICE DAILY FOR TWO WEEKS      triamcinolone acetonide (KENALOG) 0.1 % Cream Apply  to affected area(s) as needed.      fluticasone (FLONASE) 50 MCG/ACT nasal spray USE 2 SPRAYS IN EACH NOSTRIL DAILY AS NEEDED 16 g 3    mupirocin (BACTROBAN) 2 % OINT Apply  to affected area(s) as needed.      Glucosamine-Chondroit-Vit C-Mn (GLUCOSAMINE CHONDR 1500 COMPLX PO) Take  by mouth every day.      Multiple Vitamin (MULTI-VITAMIN DAILY PO) Take  by mouth.       No current facility-administered medications for this visit.     She  has a past medical history of Abnormal uterine bleeding, Abscess, Acne, Adverse effect of anesthesia, Anesthesia, Breast mass, right, Colon polyps, Dermatitis, Elevated random blood glucose level, Elevated transaminase level, Environmental and seasonal allergies, GERD (gastroesophageal reflux disease),  Hyperlipidemia, Migraine, Obesity, Osteoarthritis, Pain (04/19/2018), Snoring, Tinnitus, and Vitamin D deficiency.    ROS   No chest pain, no shortness of breath, no abdominal pain  Positive ROS as per HPI.  All other systems reviewed and are negative.     Objective:     There were no vitals taken for this visit. There is no height or weight on file to calculate BMI.     Physical Exam:  Constitutional: Alert, no distress.  Skin: Warm, dry, good turgor, no rashes in visible areas.  Eye: Equal, round and reactive, conjunctiva clear, lids normal.  ENMT: Lips without lesions, good dentition,  Respiratory: Unlabored respiratory effort  Psych: Alert and oriented x3, normal affect and mood.        Assessment and Plan:   The following treatment plan was discussed    1. Prediabetes  Unstable  Continue diet, exercise, weight loss    2. Bilateral hip pain  Unstable  Follow up with Dr. Mallory at [facility]  Continue follow up with Physiatry  Continue meloxicam 7.5-15 mg daily  - Referral to Orthopedics    3. Bilateral hip joint arthritis  Unstable, as above  - Referral to Orthopedics      Followup: Return in about 3 months (around 2/17/2024).    The MA is performing the above orders under the direction of Dr. Lizama    Please note that this dictation was created using voice recognition software. I have made every reasonable attempt to correct obvious errors, but I expect that there are errors of grammar and possibly content that I did not discover before finalizing the note.

## 2023-11-17 NOTE — ASSESSMENT & PLAN NOTE
Chronic, worsening. XR shows severe bilateral OA, L>R. Referred to ortho, would like to see group other than [facility]. Considering replacement soon. Taking meloxicam as needed, 7.5-15 mg daily.

## 2023-12-01 ENCOUNTER — APPOINTMENT (OUTPATIENT)
Dept: RADIOLOGY | Facility: MEDICAL CENTER | Age: 68
End: 2023-12-01
Attending: PHYSICAL MEDICINE & REHABILITATION
Payer: COMMERCIAL

## 2023-12-01 DIAGNOSIS — M48.04 THORACIC SPINAL STENOSIS: ICD-10-CM

## 2023-12-01 DIAGNOSIS — M54.16 LUMBAR RADICULOPATHY: ICD-10-CM

## 2023-12-01 DIAGNOSIS — G89.29 CHRONIC BILATERAL LOW BACK PAIN WITH BILATERAL SCIATICA: ICD-10-CM

## 2023-12-01 DIAGNOSIS — M54.42 CHRONIC BILATERAL LOW BACK PAIN WITH BILATERAL SCIATICA: ICD-10-CM

## 2023-12-01 DIAGNOSIS — M54.41 CHRONIC BILATERAL LOW BACK PAIN WITH BILATERAL SCIATICA: ICD-10-CM

## 2023-12-01 PROCEDURE — 72146 MRI CHEST SPINE W/O DYE: CPT

## 2023-12-11 DIAGNOSIS — M79.605 PAIN IN BOTH LOWER EXTREMITIES: ICD-10-CM

## 2023-12-11 DIAGNOSIS — M79.604 PAIN IN BOTH LOWER EXTREMITIES: ICD-10-CM

## 2023-12-12 NOTE — TELEPHONE ENCOUNTER
Received request via: Pharmacy    Was the patient seen in the last year in this department? Yes    Does the patient have an active prescription (recently filled or refills available) for medication(s) requested? YES    Does the patient have [facility] and need 100 day supply (blood pressure, diabetes and cholesterol meds only)? Patient does not have SCP  Requested Prescriptions     Pending Prescriptions Disp Refills    meloxicam (MOBIC) 7.5 MG Tab [Pharmacy Med Name: MELOXICAM TABS 7.5MG] 90 Tablet 3     Sig: TAKE 1 TABLET DAILY AS NEEDED FOR MODERATE PAIN OR INFLAMMATION

## 2023-12-13 RX ORDER — MELOXICAM 7.5 MG/1
TABLET ORAL
Qty: 90 TABLET | Refills: 1 | Status: SHIPPED | OUTPATIENT
Start: 2023-12-13

## 2023-12-28 ENCOUNTER — PATIENT MESSAGE (OUTPATIENT)
Dept: PHYSICAL MEDICINE AND REHAB | Facility: MEDICAL CENTER | Age: 68
End: 2023-12-28
Payer: COMMERCIAL

## 2023-12-29 ENCOUNTER — PREP FOR PROCEDURE (OUTPATIENT)
Dept: SURGERY | Facility: MEDICAL CENTER | Age: 68
End: 2023-12-29

## 2024-01-02 NOTE — TELEPHONE ENCOUNTER
You have been through a lot Shaunna.     I hope that 2024 gets better for you.     If that was recommended by Dr Edward then I recommend continuing with that XRAY.     Access to physical therapy has been difficult in [place] with the expanding population.     Please make an appointment with your PCP DUY Herrera to assist with your paperwork.     Best of Dr Marvin Kellogg

## 2024-01-03 PROBLEM — M16.12 OSTEOARTHRITIS OF LEFT HIP: Status: ACTIVE | Noted: 2024-01-03

## 2024-01-22 ENCOUNTER — APPOINTMENT (OUTPATIENT)
Dept: MEDICAL GROUP | Facility: MEDICAL CENTER | Age: 69
End: 2024-01-22
Payer: COMMERCIAL

## 2024-01-23 ENCOUNTER — HOSPITAL ENCOUNTER (OUTPATIENT)
Facility: MEDICAL CENTER | Age: 69
End: 2024-01-23
Attending: PHYSICIAN ASSISTANT
Payer: COMMERCIAL

## 2024-01-23 ENCOUNTER — OFFICE VISIT (OUTPATIENT)
Dept: MEDICAL GROUP | Facility: MEDICAL CENTER | Age: 69
End: 2024-01-23
Payer: COMMERCIAL

## 2024-01-23 VITALS
DIASTOLIC BLOOD PRESSURE: 60 MMHG | BODY MASS INDEX: 35.15 KG/M2 | HEART RATE: 76 BPM | SYSTOLIC BLOOD PRESSURE: 122 MMHG | WEIGHT: 191 LBS | OXYGEN SATURATION: 94 % | TEMPERATURE: 97.9 F | HEIGHT: 62 IN

## 2024-01-23 DIAGNOSIS — R39.15 URINARY URGENCY: ICD-10-CM

## 2024-01-23 LAB
APPEARANCE UR: ABNORMAL
BILIRUB UR STRIP-MCNC: NEGATIVE MG/DL
COLOR UR AUTO: ABNORMAL
GLUCOSE UR STRIP.AUTO-MCNC: NEGATIVE MG/DL
KETONES UR STRIP.AUTO-MCNC: NEGATIVE MG/DL
LEUKOCYTE ESTERASE UR QL STRIP.AUTO: NEGATIVE
NITRITE UR QL STRIP.AUTO: NEGATIVE
PH UR STRIP.AUTO: 6 [PH] (ref 5–8)
PROT UR QL STRIP: ABNORMAL MG/DL
RBC UR QL AUTO: NEGATIVE
SP GR UR STRIP.AUTO: 1.03
UROBILINOGEN UR STRIP-MCNC: ABNORMAL MG/DL

## 2024-01-23 PROCEDURE — 87086 URINE CULTURE/COLONY COUNT: CPT

## 2024-01-23 PROCEDURE — 3078F DIAST BP <80 MM HG: CPT | Performed by: PHYSICIAN ASSISTANT

## 2024-01-23 PROCEDURE — 3074F SYST BP LT 130 MM HG: CPT | Performed by: PHYSICIAN ASSISTANT

## 2024-01-23 PROCEDURE — 99214 OFFICE O/P EST MOD 30 MIN: CPT | Performed by: PHYSICIAN ASSISTANT

## 2024-01-23 PROCEDURE — 81002 URINALYSIS NONAUTO W/O SCOPE: CPT | Performed by: PHYSICIAN ASSISTANT

## 2024-01-23 RX ORDER — NITROFURANTOIN 25; 75 MG/1; MG/1
100 CAPSULE ORAL 2 TIMES DAILY
Qty: 10 CAPSULE | Refills: 0 | Status: SHIPPED | OUTPATIENT
Start: 2024-01-23 | End: 2024-01-28

## 2024-01-23 RX ORDER — AMITRIPTYLINE HYDROCHLORIDE 10 MG/1
TABLET, FILM COATED ORAL
Qty: 30 TABLET | Refills: 1 | Status: SHIPPED | OUTPATIENT
Start: 2024-01-23 | End: 2024-03-18

## 2024-01-23 ASSESSMENT — FIBROSIS 4 INDEX: FIB4 SCORE: 0.97

## 2024-01-23 NOTE — PROGRESS NOTES
Subjective:   Shaunna Vieira is a 68 y.o. female here today for     HPI: Patient is here to discuss:  Problem   Urinary Urgency    January 23rd 2024 patient comes in for intermittent and bothersome urinary urgency.  Denies painful urination but notes that she feels almost as if the bladder spasms and she has to go frequently but only gets a little bit out.  She has been dealing with this since November 2023.  Did have CT urogram that was negative.  Comes in today because she has had episodes of this urgency since this morning.  Denies fevers chills nauseousness vomiting abdominal pain or flank pain.  Cannot see blood in the urine            Current medicines (including changes today)  Current Outpatient Medications   Medication Sig Dispense Refill    nitrofurantoin (MACROBID) 100 MG Cap Take 1 Capsule by mouth 2 times a day for 5 days. 10 Capsule 0    amitriptyline (ELAVIL) 10 MG Tab Take 1 tablet by mouth nightly for take 1 tablet interstitial cystitis 30 Tablet 1    meloxicam (MOBIC) 7.5 MG Tab TAKE 1 TABLET DAILY AS NEEDED FOR MODERATE PAIN OR INFLAMMATION 90 Tablet 1    meloxicam (MOBIC) 15 MG tablet       clindamycin (CLEOCIN) 1 % Solution APPLY 1 TO 2 TIMES DAILY TO AFFECTED AREAS 60 mL 11    atorvastatin (LIPITOR) 10 MG Tab TAKE 1 TABLET EVERY EVENING 90 Tablet 3    ketoconazole (NIZORAL) 2 % Cream APPLY 1 APPLICATION TOPICALLY TWO TIMES A DAY AS NEEDED FOR RASH 30 g 23    Levocetirizine Dihydrochloride 5 MG Tab Take  by mouth every day.      PROCTOSOL HC 2.5 % Cream APPLY 1 PEASIZED AMOUNT TOPICALLY TO AFFECTED AREA TWICE DAILY FOR TWO WEEKS      triamcinolone acetonide (KENALOG) 0.1 % Cream Apply  to affected area(s) as needed.      fluticasone (FLONASE) 50 MCG/ACT nasal spray USE 2 SPRAYS IN EACH NOSTRIL DAILY AS NEEDED 16 g 3    mupirocin (BACTROBAN) 2 % OINT Apply  to affected area(s) as needed.      Glucosamine-Chondroit-Vit C-Mn (GLUCOSAMINE CHONDR 1500 COMPLX PO) Take  by mouth every day.    "   Multiple Vitamin (MULTI-VITAMIN DAILY PO) Take  by mouth.       No current facility-administered medications for this visit.     She  has a past medical history of Abnormal uterine bleeding, Abscess, Acne, Adverse effect of anesthesia, Anesthesia, Breast mass, right, Colon polyps, Dermatitis, Elevated random blood glucose level, Elevated transaminase level, Environmental and seasonal allergies, GERD (gastroesophageal reflux disease), Hyperlipidemia, Migraine, Obesity, Osteoarthritis, Pain (04/19/2018), Snoring, Tinnitus, and Vitamin D deficiency.  Morphine, Nystatin, Prilosec [omeprazole], Sulfa drugs, Pravastatin, Bio-35 gluten-free [actical], Cobalt, and Lanolin     Social History and Family History were reviewed and updated.    ROS   No headaches, chest pain, no shortness of breath, abdominal pain, nausea, or vomiting.  All other systems were reviewed and are negative or noted as positive in the HPI.       Objective:     /60 (BP Location: Left arm, Patient Position: Sitting, BP Cuff Size: Adult)   Pulse 76   Temp 36.6 °C (97.9 °F) (Temporal)   Ht 1.575 m (5' 2\")   Wt 86.6 kg (191 lb)   SpO2 94%  Body mass index is 34.93 kg/m².     Physical Exam:  General: Patient appears well-nourished, well-hydrated, nontoxic  HEENT, normocephalic atraumatic, PERRLA, extraocular movements intact, nares are patent and clear  Neck: No visible masses, thyromegaly or abnormalities noted  Cardiovascular.  Sitting comfortably without visible signs of edema  Lungs: No cyanosis noted, nondyspneic  Skin: Well perfused without evidence of rash or lesions  Neurological: Cranial nerves II through XII intact, normal gait  Musculoskeletal: Normal range of motion, normal strength and no deficit noted     Clinical Course/Lab Analysis:        Assessment and Plan:   The following treatment plan was discussed.  Signs and symptoms for which to return were discussed with patient at length.  Patient verbalized understanding.    Problem " List Items Addressed This Visit       Urinary urgency     Subacute and unstable  Patient has had a good workup including CT abdomen and pelvis urogram which I reviewed this was done November 5, 2023: At that time there was no suspicious renal ureteral or bladder mass seen or stones there was a right renal cyst that did not require follow-up and there was no renal or bladder stones.  Symptoms to me feel more in line with possible interstitial cystitis.  I did review labs and appears the last urine culture which was sometime ago was negative.  I Emma repeat a urinalysis and send for urine culture I will send Macrobid to the pharmacy and if urine culture is negative we will do a short trial of amitriptyline to see if her symptoms improve         Relevant Medications    nitrofurantoin (MACROBID) 100 MG Cap    amitriptyline (ELAVIL) 10 MG Tab    Other Relevant Orders    POCT Urinalysis    URINE CULTURE(NEW)          Followup: four weeks    Please note that this dictation was created using voice recognition software. I have made every reasonable attempt to correct obvious errors, but I expect that there are errors of grammar and possibly content that I did not discover before finalizing the note.

## 2024-01-23 NOTE — ASSESSMENT & PLAN NOTE
Subacute and unstable  Patient has had a good workup including CT abdomen and pelvis urogram which I reviewed this was done November 5, 2023: At that time there was no suspicious renal ureteral or bladder mass seen or stones there was a right renal cyst that did not require follow-up and there was no renal or bladder stones.  Symptoms to me feel more in line with possible interstitial cystitis.  I did review labs and appears the last urine culture which was sometime ago was negative.  I Emma repeat a urinalysis and send for urine culture I will send Macrobid to the pharmacy and if urine culture is negative we will do a short trial of amitriptyline to see if her symptoms improve

## 2024-01-25 ENCOUNTER — OFFICE VISIT (OUTPATIENT)
Dept: MEDICAL GROUP | Facility: MEDICAL CENTER | Age: 69
End: 2024-01-25
Payer: COMMERCIAL

## 2024-01-25 VITALS
HEIGHT: 62 IN | HEART RATE: 86 BPM | WEIGHT: 188 LBS | TEMPERATURE: 96.5 F | OXYGEN SATURATION: 94 % | DIASTOLIC BLOOD PRESSURE: 62 MMHG | SYSTOLIC BLOOD PRESSURE: 110 MMHG | BODY MASS INDEX: 34.6 KG/M2

## 2024-01-25 DIAGNOSIS — R10.2 PELVIC PAIN: ICD-10-CM

## 2024-01-25 DIAGNOSIS — Z01.818 PREOPERATIVE CLEARANCE: ICD-10-CM

## 2024-01-25 DIAGNOSIS — R39.15 URINARY URGENCY: ICD-10-CM

## 2024-01-25 DIAGNOSIS — M25.552 BILATERAL HIP PAIN: ICD-10-CM

## 2024-01-25 DIAGNOSIS — Z13.0 ENCOUNTER FOR SCREENING FOR HEMATOLOGIC DISORDER: ICD-10-CM

## 2024-01-25 DIAGNOSIS — R73.03 PREDIABETES: ICD-10-CM

## 2024-01-25 DIAGNOSIS — M25.551 BILATERAL HIP PAIN: ICD-10-CM

## 2024-01-25 DIAGNOSIS — Z13.228 ENCOUNTER FOR SCREENING FOR METABOLIC DISORDER: ICD-10-CM

## 2024-01-25 PROCEDURE — 3074F SYST BP LT 130 MM HG: CPT | Performed by: NURSE PRACTITIONER

## 2024-01-25 PROCEDURE — 3078F DIAST BP <80 MM HG: CPT | Performed by: NURSE PRACTITIONER

## 2024-01-25 PROCEDURE — 99214 OFFICE O/P EST MOD 30 MIN: CPT | Performed by: NURSE PRACTITIONER

## 2024-01-25 ASSESSMENT — FIBROSIS 4 INDEX: FIB4 SCORE: 0.97

## 2024-01-25 NOTE — LETTER
[facility]  DIAN Herrera.  [address]  [place] [address]  Fax: [phone]   Authorization for Release/Disclosure of   Protected Health Information   Name: LIVE HERNANDEZ : 1955 SSN: [id number]   Address: [address] [place] [address] [place] [address] Phone:    [phone] (home) [phone] (work)   I authorize the entity listed below to release/disclose the PHI below to:   [facility]/DUY Herrera and DUY Herrera   Provider or Entity Name:     Address   City, State, Zip   Phone:      Fax:     Reason for request: continuity of care   Information to be released:    [  ] LAST COLONOSCOPY,  including any PATH REPORT and follow-up  [  ] LAST FIT/COLOGUARD RESULT [  ] LAST DEXA  [  ] LAST MAMMOGRAM  [  ] LAST PAP  [  ] LAST LABS [  ] RETINA EXAM REPORT  [  ] IMMUNIZATION RECORDS  [  ] Release all info      [  ] Check here and initial the line next to each item to release ALL health information INCLUDING  _____ Care and treatment for drug and / or alcohol abuse  _____ HIV testing, infection status, or AIDS  _____ Genetic Testing    DATES OF SERVICE OR TIME PERIOD TO BE DISCLOSED: _____________  I understand and acknowledge that:  * This Authorization may be revoked at any time by you in writing, except if your health information has already been used or disclosed.  * Your health information that will be used or disclosed as a result of you signing this authorization could be re-disclosed by the recipient. If this occurs, your re-disclosed health information may no longer be protected by State or Federal laws.  * You may refuse to sign this Authorization. Your refusal will not affect your ability to obtain treatment.  * This Authorization becomes effective upon signing and will  on (date) __________.      If no date is indicated, this Authorization will  one (1) year from the signature date.    Name: Liev Hernandez  Signature:  Date:   1/25/2024     PLEASE FAX REQUESTED RECORDS BACK TO: [phone]

## 2024-01-25 NOTE — ASSESSMENT & PLAN NOTE
Chronic, worsening. XR shows severe bilateral OA, L>R. Now established with Dr. Shawnee Zhang. Taking meloxicam as needed, 7.5-15 mg daily.     Planning for hip replacement next week. Needs surgical clearance today.

## 2024-01-25 NOTE — PROGRESS NOTES
REASON FOR VISIT: Pre-Op Consultation  Consultation Requested by: Dr. Mallory  Procedure date and type: 1/30/2024    History of condition for which surgery is planned: Severe bone on bone left hip arthritis     Current chronic conditions: does not have any pertinent problems on file.  Past medical history:  has a past medical history of Abnormal uterine bleeding, Abscess, Acne, Adverse effect of anesthesia, Anesthesia, Breast mass, right, Colon polyps, Dermatitis, Elevated random blood glucose level, Elevated transaminase level, Environmental and seasonal allergies, GERD (gastroesophageal reflux disease), Hyperlipidemia, Migraine, Obesity, Osteoarthritis, Pain (04/19/2018), Snoring, Tinnitus, and Vitamin D deficiency.. Negative for: CAD, SBE, CVA, TIA, DVT, PE, bleeding requiring transfusion, intubation.  Surgical and anesthetic history:  has a past surgical history that includes other (2015); knee arthroscopy (Left, 7/17/2015); meniscectomy (Left, 7/17/2015); synovectomy (Left, 7/17/2015); breast biopsy; hysteroscopy with myosure (5/4/2018); dilation and curettage (5/4/2018); and pr total knee arthroplasty (Left, 7/12/2022). Prior surgery without complication, bleeding, reaction to anesthetic, prolonged recovery  Habits:   Social History     Tobacco Use    Smoking status: Never    Smokeless tobacco: Never   Substance Use Topics    Alcohol use: Yes     Comment: 1 per month    Drug use: No     Allergies: Morphine, Nystatin, Prilosec [omeprazole], Sulfa drugs, Pravastatin, Bio-35 gluten-free [actical], Cobalt, and Lanolin No known allergy to Anesthetic, or Latex.     Current medicines:   Current Outpatient Medications   Medication Sig Dispense Refill    nitrofurantoin (MACROBID) 100 MG Cap Take 1 Capsule by mouth 2 times a day for 5 days. 10 Capsule 0    amitriptyline (ELAVIL) 10 MG Tab Take 1 tablet by mouth nightly for take 1 tablet interstitial cystitis 30 Tablet 1    meloxicam (MOBIC) 7.5 MG Tab TAKE 1 TABLET DAILY  "AS NEEDED FOR MODERATE PAIN OR INFLAMMATION 90 Tablet 1    meloxicam (MOBIC) 15 MG tablet       clindamycin (CLEOCIN) 1 % Solution APPLY 1 TO 2 TIMES DAILY TO AFFECTED AREAS 60 mL 11    atorvastatin (LIPITOR) 10 MG Tab TAKE 1 TABLET EVERY EVENING 90 Tablet 3    ketoconazole (NIZORAL) 2 % Cream APPLY 1 APPLICATION TOPICALLY TWO TIMES A DAY AS NEEDED FOR RASH 30 g 23    Levocetirizine Dihydrochloride 5 MG Tab Take  by mouth every day.      PROCTOSOL HC 2.5 % Cream APPLY 1 PEASIZED AMOUNT TOPICALLY TO AFFECTED AREA TWICE DAILY FOR TWO WEEKS      triamcinolone acetonide (KENALOG) 0.1 % Cream Apply  to affected area(s) as needed.      fluticasone (FLONASE) 50 MCG/ACT nasal spray USE 2 SPRAYS IN EACH NOSTRIL DAILY AS NEEDED 16 g 3    mupirocin (BACTROBAN) 2 % OINT Apply  to affected area(s) as needed.      Glucosamine-Chondroit-Vit C-Mn (GLUCOSAMINE CHONDR 1500 COMPLX PO) Take  by mouth every day.      Multiple Vitamin (MULTI-VITAMIN DAILY PO) Take  by mouth.       No current facility-administered medications for this visit.     Anticoagulant: ASA, NSAIDs- Stop all 2 weeks prior to surgery   Herbals: Black Cohash, Echinacea, Garlic, Ginger, Ginkgo Biloba, Ginseng, Kava, Jim’s Wort,  Saw Palmetto, Valerian             ROS: negative for: CP, SOB, NOBLE, Orthopnea, wheezing, leg edema, polydipsia, polyuria, fevers, chills, sweats, cough, cold, congestion, abd pain, reflux, black or bloody stools, weight loss/gain.  Functional Status: ambulatory, uses assistive device, prolonged immobility, lives alone, requires PT/FT care    PHYSICAL EXAMINATION:  VITAL SIGNS: /62 (BP Location: Left arm, Patient Position: Sitting, BP Cuff Size: Adult)   Pulse 86   Temp 35.8 °C (96.5 °F) (Temporal)   Ht 1.575 m (5' 2\")   Wt 85.3 kg (188 lb)   SpO2 94%  Body mass index is 34.39 kg/m².    HEENT: EOMI, PERRL. Oropharynx pink, moist. Mallampati: II (soft palate, uvula, fauces visible). Neck supple, no cervical lymphadenopathy. "   LUNGS: CTAB good excursion.   HEART: RRR no murmur.  ABDOMEN: soft, nondistended, nontender normal BS. No HSM.  LOWER EXTREMITIES: warm and well perfused with no edema.      Labs: See attached.    IMPRESSION:  Diagnoses of Preoperative clearance, Bilateral hip pain, Urinary urgency, Pelvic pain, Encounter for screening for hematologic disorder, Encounter for screening for metabolic disorder, and Prediabetes were pertinent to this visit.  Planned surgery: Left MJ   High risk medical conditions: negative for Cardiac, Pulmonary, Bleeding, Poor healing, Thrombosis, Debility    PLAN:    1. Preoperative clearance-Low risk  Chronic medical conditions: Stable and controlled. Continue current medicines.   Avoid drugs that potentiate bleeding as advised by surgeon  Discontinue all herbal supplements 2 weeks prior to surgery.  Need for SBE prophylaxis: No  This patient is considered Low risk for cardiopulmonary complications for this planned surgery.  EKG normal sinus rhythm, no acute or chronic changes  Do labs within 30 days of surgery  - EKG - Clinic Performed  - CBC WITH DIFFERENTIAL; Future  - Comp Metabolic Panel; Future    2. Bilateral hip pain  Unstable, continue follow up with surgery    3. Urinary urgency  Unstable  Check pelvic US to eval for GYN or pelvic etiology for urinary symptoms  - US-PELVIC COMPLETE (TRANSABDOMINAL/TRANSVAGINAL) (COMBO); Future    4. Pelvic pain  - US-PELVIC COMPLETE (TRANSABDOMINAL/TRANSVAGINAL) (COMBO); Future    5. Encounter for screening for hematologic disorder  - CBC WITH DIFFERENTIAL; Future    6. Encounter for screening for metabolic disorder  - Comp Metabolic Panel; Future    7. Prediabetes  Stable  Diet, exercise, weight loss    Germán Index for assessing perioperative cardiovascular risk [Circulation 1999;100:1047]:   (one point each for * high-risk surgery [ intrathoracic, suprainguinal vascular, intraperitoneal ],* Hx ischemic heart dz, * Hx CHF, *Hx TIA or CVA, *IDDM, *Serum Cr  >2.0)   Interpretation of risk: (Complication = MI, Pulm edema, v-fib or cardiac arrest, complete heart block)  Very Low: 0 points = 0.4 % complication. Low: 1 point = 0.9 %, Moderate: 2 points = 6.6 %, High: 3+ points = 11%.    The MA is performing the above orders under the direction of Dr. Lizama    Please note that this dictation was created using voice recognition software. I have made every reasonable attempt to correct obvious errors, but I expect that there are errors of grammar and possibly content that I did not discover before finalizing the note.

## 2024-01-26 LAB
BACTERIA UR CULT: NORMAL
SIGNIFICANT IND 70042: NORMAL
SITE SITE: NORMAL
SOURCE SOURCE: NORMAL

## 2024-02-14 ENCOUNTER — APPOINTMENT (OUTPATIENT)
Dept: RADIOLOGY | Facility: MEDICAL CENTER | Age: 69
End: 2024-02-14
Attending: NURSE PRACTITIONER
Payer: COMMERCIAL

## 2024-03-05 DIAGNOSIS — L30.9 DERMATITIS: ICD-10-CM

## 2024-03-06 NOTE — TELEPHONE ENCOUNTER
KETOCONAZOLE CREAM 30GM 2%     Received request via: Pharmacy    Was the patient seen in the last year in this department? Yes    Does the patient have an active prescription (recently filled or refills available) for medication(s) requested? No    Pharmacy Name: [facility] - [place], [place] - [address] [place] [address]     [facility] the [facility] and need 100 day supply (blood pressure, diabetes and cholesterol meds only)? Patient does not have SCP

## 2024-03-10 RX ORDER — KETOCONAZOLE 20 MG/G
CREAM TOPICAL
Qty: 30 G | Refills: 3 | Status: SHIPPED | OUTPATIENT
Start: 2024-03-10

## 2024-03-16 DIAGNOSIS — R39.15 URINARY URGENCY: ICD-10-CM

## 2024-03-18 RX ORDER — AMITRIPTYLINE HYDROCHLORIDE 10 MG/1
TABLET, FILM COATED ORAL
Qty: 90 TABLET | Refills: 3 | Status: SHIPPED | OUTPATIENT
Start: 2024-03-18

## 2024-03-18 NOTE — TELEPHONE ENCOUNTER
AMITRIPTYLINE HCL 10 MG TAB     Received request via: Pharmacy    Was the patient seen in the last year in this department? Yes    Does the patient have an active prescription (recently filled or refills available) for medication(s) requested? No    Pharmacy Name:  [id number] - [place], [place] - [address]     Does the patient have [facility] and need 100 day supply (blood pressure, diabetes and cholesterol meds only)? Patient does not have SCP

## 2024-03-24 DIAGNOSIS — E78.00 PURE HYPERCHOLESTEROLEMIA: ICD-10-CM

## 2024-03-25 NOTE — TELEPHONE ENCOUNTER
Received request via: Pharmacy    Was the patient seen in the last year in this department? Yes    Does the patient have an active prescription (recently filled or refills available) for medication(s) requested? No    [facility]    Does the patient have group home [facility] and need 100 day supply (blood pressure, diabetes and cholesterol meds only)? Patient does not have SCP

## 2024-03-26 RX ORDER — ATORVASTATIN CALCIUM 10 MG/1
TABLET, FILM COATED ORAL
Qty: 90 TABLET | Refills: 3 | Status: SHIPPED | OUTPATIENT
Start: 2024-03-26

## 2024-06-27 DIAGNOSIS — M79.604 PAIN IN BOTH LOWER EXTREMITIES: ICD-10-CM

## 2024-06-27 DIAGNOSIS — M79.605 PAIN IN BOTH LOWER EXTREMITIES: ICD-10-CM

## 2024-07-01 RX ORDER — MELOXICAM 7.5 MG/1
TABLET ORAL
Qty: 90 TABLET | Refills: 1 | Status: SHIPPED | OUTPATIENT
Start: 2024-07-01

## 2024-07-12 DIAGNOSIS — L30.9 DERMATITIS: ICD-10-CM

## 2024-07-15 RX ORDER — KETOCONAZOLE 20 MG/G
CREAM TOPICAL
Qty: 30 G | Refills: 3 | Status: SHIPPED | OUTPATIENT
Start: 2024-07-15

## 2024-08-12 ENCOUNTER — OFFICE VISIT (OUTPATIENT)
Dept: MEDICAL GROUP | Facility: MEDICAL CENTER | Age: 69
End: 2024-08-12
Payer: COMMERCIAL

## 2024-08-12 VITALS
SYSTOLIC BLOOD PRESSURE: 102 MMHG | OXYGEN SATURATION: 96 % | HEIGHT: 62 IN | BODY MASS INDEX: 35.7 KG/M2 | HEART RATE: 82 BPM | DIASTOLIC BLOOD PRESSURE: 60 MMHG | TEMPERATURE: 97.3 F | WEIGHT: 194 LBS

## 2024-08-12 DIAGNOSIS — L73.2 HIDRADENITIS SUPPURATIVA: ICD-10-CM

## 2024-08-12 DIAGNOSIS — Z96.652 HISTORY OF LEFT KNEE REPLACEMENT: ICD-10-CM

## 2024-08-12 DIAGNOSIS — Z00.00 ANNUAL PHYSICAL EXAM: ICD-10-CM

## 2024-08-12 DIAGNOSIS — R73.03 PREDIABETES: ICD-10-CM

## 2024-08-12 DIAGNOSIS — Z79.2 NEED FOR ANTIBIOTIC PROPHYLAXIS FOR DENTAL PROCEDURE: ICD-10-CM

## 2024-08-12 DIAGNOSIS — E66.9 OBESITY (BMI 30-39.9): ICD-10-CM

## 2024-08-12 DIAGNOSIS — E78.00 PURE HYPERCHOLESTEROLEMIA: ICD-10-CM

## 2024-08-12 DIAGNOSIS — E55.9 VITAMIN D DEFICIENCY: ICD-10-CM

## 2024-08-12 DIAGNOSIS — Z23 NEED FOR VACCINATION: ICD-10-CM

## 2024-08-12 DIAGNOSIS — L30.9 DERMATITIS: ICD-10-CM

## 2024-08-12 DIAGNOSIS — Z96.642 HISTORY OF LEFT HIP REPLACEMENT: ICD-10-CM

## 2024-08-12 PROCEDURE — 99214 OFFICE O/P EST MOD 30 MIN: CPT | Mod: 25 | Performed by: NURSE PRACTITIONER

## 2024-08-12 PROCEDURE — 3078F DIAST BP <80 MM HG: CPT | Performed by: NURSE PRACTITIONER

## 2024-08-12 PROCEDURE — 90471 IMMUNIZATION ADMIN: CPT | Performed by: NURSE PRACTITIONER

## 2024-08-12 PROCEDURE — 90677 PCV20 VACCINE IM: CPT | Performed by: NURSE PRACTITIONER

## 2024-08-12 PROCEDURE — 3074F SYST BP LT 130 MM HG: CPT | Performed by: NURSE PRACTITIONER

## 2024-08-12 RX ORDER — AMOXICILLIN 500 MG/1
TABLET, FILM COATED ORAL
COMMUNITY
Start: 2024-06-14 | End: 2024-08-12 | Stop reason: SDUPTHER

## 2024-08-12 RX ORDER — IBUPROFEN 800 MG/1
TABLET, FILM COATED ORAL
COMMUNITY
Start: 2024-07-10

## 2024-08-12 RX ORDER — KETOCONAZOLE 20 MG/G
CREAM TOPICAL
Qty: 30 G | Refills: 3 | Status: SHIPPED | OUTPATIENT
Start: 2024-08-12

## 2024-08-12 RX ORDER — AMOXICILLIN 500 MG/1
TABLET, FILM COATED ORAL
Qty: 4 TABLET | Refills: 6 | Status: SHIPPED | OUTPATIENT
Start: 2024-08-12

## 2024-08-12 RX ORDER — CLINDAMYCIN PHOSPHATE 11.9 MG/ML
SOLUTION TOPICAL
Qty: 60 ML | Refills: 11 | Status: SHIPPED | OUTPATIENT
Start: 2024-08-12

## 2024-08-12 ASSESSMENT — FIBROSIS 4 INDEX: FIB4 SCORE: 0.97

## 2024-08-15 LAB
ALBUMIN SERPL-MCNC: 4.6 G/DL (ref 3.9–4.9)
ALP SERPL-CCNC: 76 IU/L (ref 44–121)
ALT SERPL-CCNC: 23 IU/L (ref 0–32)
AST SERPL-CCNC: 20 IU/L (ref 0–40)
BILIRUB SERPL-MCNC: 1.1 MG/DL (ref 0–1.2)
BUN SERPL-MCNC: 18 MG/DL (ref 8–27)
BUN/CREAT SERPL: 20 (ref 12–28)
CALCIUM SERPL-MCNC: 9.4 MG/DL (ref 8.7–10.3)
CHLORIDE SERPL-SCNC: 103 MMOL/L (ref 96–106)
CO2 SERPL-SCNC: 26 MMOL/L (ref 20–29)
CREAT SERPL-MCNC: 0.89 MG/DL (ref 0.57–1)
EGFRCR SERPLBLD CKD-EPI 2021: 71 ML/MIN/1.73
GLOBULIN SER CALC-MCNC: 2.5 G/DL (ref 1.5–4.5)
GLUCOSE SERPL-MCNC: 109 MG/DL (ref 70–99)
HBA1C MFR BLD: 5.7 % (ref 4.8–5.6)
POTASSIUM SERPL-SCNC: 4.6 MMOL/L (ref 3.5–5.2)
PROT SERPL-MCNC: 7.1 G/DL (ref 6–8.5)
SODIUM SERPL-SCNC: 140 MMOL/L (ref 134–144)

## 2024-08-19 NOTE — PROGRESS NOTES
Subjective:     History of Present Illness  The patient presents for evaluation of weight management.    She has been grappling with weight issues since the age of 10. Despite occasional successful weight loss, she continues to gain weight. Her weight has remained stable, but she is dissatisfied with the current stability. She is considering Ozempic as a potential treatment option. In high school, she was prescribed a stimulant medication for a year, which was effective in weight loss. However, upon discontinuing the medication, she regained weight. She recalls feeling restless, agitated, and experiencing moodiness while on phentermine. Her weight has fluctuated between 150 and 180 pounds, but she has not noticed any changes in her behavior. She maintains an active lifestyle, riding 3 miles on her bike almost daily.    She continues to experience rashes. She manages this with sponge baths and hair dryers.    She requests refills for ketoconazole and clindamycin opical.           Current medicines (including changes today)  Current Outpatient Medications   Medication Sig Dispense Refill    ketoconazole (NIZORAL) 2 % Cream APPLY 1 APPLICATION TOPICALLY TWO TIMES A DAY AS NEEDED FOR RASH 30 g 3    ibuprofen (MOTRIN) 800 MG Tab       Amoxicillin 500 MG Tab TAKE 4 TABLETS ORALLY 1 HOUR PRIOR TO DENTAL CLEANING/PROCEDURE 4 Tablet 6    clindamycin (CLEOCIN) 1 % Solution APPLY 1 TO 2 TIMES DAILY TO AFFECTED AREAS 60 mL 11    meloxicam (MOBIC) 7.5 MG Tab TAKE 1 TABLET DAILY AS NEEDED FOR MODERATE PAIN OR INFLAMMATION 90 Tablet 1    atorvastatin (LIPITOR) 10 MG Tab TAKE 1 TABLET EVERY EVENING 90 Tablet 3    amitriptyline (ELAVIL) 10 MG Tab TAKE 1 TABLET BY MOUTH NIGHTLY FOR INTERSTITIAL CYSTITIS 90 Tablet 3    Levocetirizine Dihydrochloride 5 MG Tab Take  by mouth every day.      PROCTOSOL HC 2.5 % Cream APPLY 1 PEASIZED AMOUNT TOPICALLY TO AFFECTED AREA TWICE DAILY FOR TWO WEEKS      triamcinolone acetonide (KENALOG) 0.1 %  "Cream Apply  to affected area(s) as needed.      fluticasone (FLONASE) 50 MCG/ACT nasal spray USE 2 SPRAYS IN EACH NOSTRIL DAILY AS NEEDED 16 g 3    mupirocin (BACTROBAN) 2 % OINT Apply  to affected area(s) as needed.      Glucosamine-Chondroit-Vit C-Mn (GLUCOSAMINE CHONDR 1500 COMPLX PO) Take  by mouth every day.      Multiple Vitamin (MULTI-VITAMIN DAILY PO) Take  by mouth.       No current facility-administered medications for this visit.     She  has a past medical history of Abnormal uterine bleeding, Abscess, Acne, Adverse effect of anesthesia, Anesthesia, Breast mass, right, Colon polyps, Dermatitis, Elevated random blood glucose level, Elevated transaminase level, Environmental and seasonal allergies, GERD (gastroesophageal reflux disease), Hyperlipidemia, Migraine, Obesity, Osteoarthritis, Pain (04/19/2018), Snoring, Tinnitus, and Vitamin D deficiency.    ROS  No chest pain, no shortness of breath, no abdominal pain  Positive ROS as per HPI.  All other systems reviewed and are negative.     Objective:     /60   Pulse 82   Temp 36.3 °C (97.3 °F) (Temporal)   Ht 1.575 m (5' 2\")   Wt 88 kg (194 lb)   SpO2 96%  Body mass index is 35.48 kg/m².   Physical Exam    Constitutional: Alert, no distress.  Skin: Warm, dry, good turgor, no rashes in visible areas.  Eye: Equal, round and reactive, conjunctiva clear, lids normal.  ENMT: Lips without lesions, good dentition  Respiratory: Unlabored respiratory effort  Psych: Alert and oriented x3, normal affect and mood.      Results          Assessment and Plan:   The following treatment plan was discussed    Assessment & Plan        ORDERS:  1. Obesity (BMI 30-39.9)  Unstable  Discussed medication options, patient decided on compounded mounjaro as she will have the most benefit with her history of fatty liver disease and prediabetes  She will start at 2.5 mg weekly  Information given to patient  Will send in RX once labwork is completed    2. " Prediabetes  Unstable, due for labs  Start mounjaro after labs completed  - Comp Metabolic Panel; Future  - HEMOGLOBIN A1C; Future    3. Dermatitis  Stable on ketoconazole BID as needed, refilled  - ketoconazole (NIZORAL) 2 % Cream; APPLY 1 APPLICATION TOPICALLY TWO TIMES A DAY AS NEEDED FOR RASH  Dispense: 30 g; Refill: 3    4. History of left hip replacement  Refilled dental prophylaxix  - Amoxicillin 500 MG Tab; TAKE 4 TABLETS ORALLY 1 HOUR PRIOR TO DENTAL CLEANING/PROCEDURE  Dispense: 4 Tablet; Refill: 6    5. History of left knee replacement  - Amoxicillin 500 MG Tab; TAKE 4 TABLETS ORALLY 1 HOUR PRIOR TO DENTAL CLEANING/PROCEDURE  Dispense: 4 Tablet; Refill: 6    6. Need for antibiotic prophylaxis for dental procedure  - Amoxicillin 500 MG Tab; TAKE 4 TABLETS ORALLY 1 HOUR PRIOR TO DENTAL CLEANING/PROCEDURE  Dispense: 4 Tablet; Refill: 6    7. Hidradenitis suppurativa  - clindamycin (CLEOCIN) 1 % Solution; APPLY 1 TO 2 TIMES DAILY TO AFFECTED AREAS  Dispense: 60 mL; Refill: 11    8. Annual physical exam  - CBC WITH DIFFERENTIAL; Future  - Comp Metabolic Panel; Future  - VITAMIN D,25 HYDROXY (DEFICIENCY); Future  - Lipid Profile; Future  - TSH WITH REFLEX TO FT4; Future    9. Vitamin D deficiency  - VITAMIN D,25 HYDROXY (DEFICIENCY); Future    10. Pure hypercholesterolemia  - Lipid Profile; Future    11. Need for vaccination  - Pneumococcal Conjugate Vaccine 20-Valent (6 wks+)          The MA is performing the above orders under the direction of Dr. Lizama    Please note that this dictation was created using voice recognition software. I have made every reasonable attempt to correct obvious errors, but I expect that there are errors of grammar and possibly content that I did not discover before finalizing the note.      Attestation      Verbal consent was acquired by the patient to use [employer] ambient listening note generation during this visit Yes

## 2024-09-30 ENCOUNTER — OFFICE VISIT (OUTPATIENT)
Dept: MEDICAL GROUP | Facility: MEDICAL CENTER | Age: 69
End: 2024-09-30
Payer: COMMERCIAL

## 2024-09-30 VITALS
OXYGEN SATURATION: 98 % | WEIGHT: 195 LBS | TEMPERATURE: 97 F | SYSTOLIC BLOOD PRESSURE: 114 MMHG | HEIGHT: 62 IN | BODY MASS INDEX: 35.88 KG/M2 | DIASTOLIC BLOOD PRESSURE: 62 MMHG | HEART RATE: 58 BPM

## 2024-09-30 DIAGNOSIS — R73.03 PREDIABETES: ICD-10-CM

## 2024-09-30 DIAGNOSIS — E66.9 OBESITY (BMI 30-39.9): ICD-10-CM

## 2024-09-30 DIAGNOSIS — Z23 NEED FOR VACCINATION: ICD-10-CM

## 2024-09-30 ASSESSMENT — FIBROSIS 4 INDEX: FIB4 SCORE: 1.12

## 2024-09-30 NOTE — PROGRESS NOTES
Subjective:   Shaunna Vieira is a 68 y.o. female here today for weight management follow up    Obesity (BMI 30-39.9)  Started Mounjaro 6 weeks ago, did 2.5 mg weekly for 4 weeks, then increased to 5 mg weekly for the past 2 weeks. Is down 3 pounds per her home scale. No significant side effects. Denies nausea, vomiting, diarrhea, constipation. She has been taking daily Vitamin C and Magnesium Citrate as recommended by pharmacy.     Continues working on dietary changes, increasing activity as able.        Current medicines (including changes today)  Current Outpatient Medications   Medication Sig Dispense Refill    ketoconazole (NIZORAL) 2 % Cream APPLY 1 APPLICATION TOPICALLY TWO TIMES A DAY AS NEEDED FOR RASH 30 g 3    ibuprofen (MOTRIN) 800 MG Tab       Amoxicillin 500 MG Tab TAKE 4 TABLETS ORALLY 1 HOUR PRIOR TO DENTAL CLEANING/PROCEDURE 4 Tablet 6    clindamycin (CLEOCIN) 1 % Solution APPLY 1 TO 2 TIMES DAILY TO AFFECTED AREAS 60 mL 11    meloxicam (MOBIC) 7.5 MG Tab TAKE 1 TABLET DAILY AS NEEDED FOR MODERATE PAIN OR INFLAMMATION 90 Tablet 1    atorvastatin (LIPITOR) 10 MG Tab TAKE 1 TABLET EVERY EVENING 90 Tablet 3    amitriptyline (ELAVIL) 10 MG Tab TAKE 1 TABLET BY MOUTH NIGHTLY FOR INTERSTITIAL CYSTITIS 90 Tablet 3    Levocetirizine Dihydrochloride 5 MG Tab Take  by mouth every day.      PROCTOSOL HC 2.5 % Cream APPLY 1 PEASIZED AMOUNT TOPICALLY TO AFFECTED AREA TWICE DAILY FOR TWO WEEKS      triamcinolone acetonide (KENALOG) 0.1 % Cream Apply  to affected area(s) as needed.      fluticasone (FLONASE) 50 MCG/ACT nasal spray USE 2 SPRAYS IN EACH NOSTRIL DAILY AS NEEDED 16 g 3    mupirocin (BACTROBAN) 2 % OINT Apply  to affected area(s) as needed.      Glucosamine-Chondroit-Vit C-Mn (GLUCOSAMINE CHONDR 1500 COMPLX PO) Take  by mouth every day.      Multiple Vitamin (MULTI-VITAMIN DAILY PO) Take  by mouth.       No current facility-administered medications for this visit.     She  has a past medical  "history of Abnormal uterine bleeding, Abscess, Acne, Adverse effect of anesthesia, Anesthesia, Breast mass, right, Colon polyps, Dermatitis, Elevated random blood glucose level, Elevated transaminase level, Environmental and seasonal allergies, GERD (gastroesophageal reflux disease), Hyperlipidemia, Migraine, Obesity, Osteoarthritis, Pain (04/19/2018), Snoring, Tinnitus, and Vitamin D deficiency.    ROS   No chest pain, no shortness of breath, no abdominal pain  Positive ROS as per HPI.  All other systems reviewed and are negative.     Objective:     /62   Pulse (!) 58   Temp 36.1 °C (97 °F) (Temporal)   Ht 1.575 m (5' 2\")   Wt 88.5 kg (195 lb)   SpO2 98%  Body mass index is 35.67 kg/m².     Physical Exam:  Constitutional: Alert, no distress.  Skin: Warm, dry, good turgor, no rashes in visible areas.  Eye: Equal, round and reactive, conjunctiva clear, lids normal.  ENMT: Lips without lesions, good dentition  Respiratory: Unlabored respiratory effort  Psych: Alert and oriented x3, normal affect and mood.        Assessment and Plan:   The following treatment plan was discussed    1. Obesity (BMI 30-39.9)  Unstable, slightly improved  Continue mounjaro at 5 mg weekly for 2 more weeks, then increase to 7.5 mg weekly for 4 weeks. If tolerated then she may increase to 10 mg weekly.     2. Prediabetes  Stable, improving     3. Need for vaccination  - INFLUENZA VACCINE, HIGH DOSE (65+ ONLY)      Followup: Return in about 8 weeks (around 11/25/2024).    The MA is performing the above orders under the direction of Dr. Lizama    Please note that this dictation was created using voice recognition software. I have made every reasonable attempt to correct obvious errors, but I expect that there are errors of grammar and possibly content that I did not discover before finalizing the note.               "

## 2024-09-30 NOTE — ASSESSMENT & PLAN NOTE
Started Mounjaro 6 weeks ago, did 2.5 mg weekly for 4 weeks, then increased to 5 mg weekly for the past 2 weeks. Is down 3 pounds per her home scale. No significant side effects. Denies nausea, vomiting, diarrhea, constipation. She has been taking daily Vitamin C and Magnesium Citrate as recommended by pharmacy.     Continues working on dietary changes, increasing activity as able.

## 2024-10-23 ENCOUNTER — OFFICE VISIT (OUTPATIENT)
Dept: PHYSICAL MEDICINE AND REHAB | Facility: MEDICAL CENTER | Age: 69
End: 2024-10-23
Payer: COMMERCIAL

## 2024-10-23 VITALS
SYSTOLIC BLOOD PRESSURE: 102 MMHG | WEIGHT: 185 LBS | HEIGHT: 63 IN | BODY MASS INDEX: 32.78 KG/M2 | DIASTOLIC BLOOD PRESSURE: 71 MMHG | TEMPERATURE: 96.9 F | HEART RATE: 65 BPM | OXYGEN SATURATION: 95 %

## 2024-10-23 DIAGNOSIS — Z71.82 EXERCISE COUNSELING: ICD-10-CM

## 2024-10-23 DIAGNOSIS — G89.29 CHRONIC PAIN OF BOTH LOWER EXTREMITIES: ICD-10-CM

## 2024-10-23 DIAGNOSIS — G89.29 CHRONIC BILATERAL LOW BACK PAIN WITH BILATERAL SCIATICA: ICD-10-CM

## 2024-10-23 DIAGNOSIS — M54.42 CHRONIC BILATERAL LOW BACK PAIN WITH BILATERAL SCIATICA: ICD-10-CM

## 2024-10-23 DIAGNOSIS — M54.41 CHRONIC BILATERAL LOW BACK PAIN WITH BILATERAL SCIATICA: ICD-10-CM

## 2024-10-23 DIAGNOSIS — Z96.652 HISTORY OF LEFT KNEE REPLACEMENT: ICD-10-CM

## 2024-10-23 DIAGNOSIS — Z96.642 HISTORY OF LEFT HIP REPLACEMENT: ICD-10-CM

## 2024-10-23 DIAGNOSIS — M79.604 CHRONIC PAIN OF BOTH LOWER EXTREMITIES: ICD-10-CM

## 2024-10-23 DIAGNOSIS — G89.29 CHRONIC PAIN OF LEFT KNEE: ICD-10-CM

## 2024-10-23 DIAGNOSIS — M16.10 ARTHRITIS OF HIP: ICD-10-CM

## 2024-10-23 DIAGNOSIS — M54.16 LUMBAR RADICULOPATHY: ICD-10-CM

## 2024-10-23 DIAGNOSIS — E66.9 OBESITY (BMI 30-39.9): ICD-10-CM

## 2024-10-23 DIAGNOSIS — M79.605 CHRONIC PAIN OF BOTH LOWER EXTREMITIES: ICD-10-CM

## 2024-10-23 DIAGNOSIS — M25.562 CHRONIC PAIN OF LEFT KNEE: ICD-10-CM

## 2024-10-23 PROCEDURE — 3078F DIAST BP <80 MM HG: CPT | Performed by: PHYSICAL MEDICINE & REHABILITATION

## 2024-10-23 PROCEDURE — 1125F AMNT PAIN NOTED PAIN PRSNT: CPT | Performed by: PHYSICAL MEDICINE & REHABILITATION

## 2024-10-23 PROCEDURE — G2211 COMPLEX E/M VISIT ADD ON: HCPCS | Performed by: PHYSICAL MEDICINE & REHABILITATION

## 2024-10-23 PROCEDURE — 99214 OFFICE O/P EST MOD 30 MIN: CPT | Performed by: PHYSICAL MEDICINE & REHABILITATION

## 2024-10-23 PROCEDURE — 3074F SYST BP LT 130 MM HG: CPT | Performed by: PHYSICAL MEDICINE & REHABILITATION

## 2024-10-23 RX ORDER — CELECOXIB 100 MG/1
100 CAPSULE ORAL 2 TIMES DAILY
COMMUNITY

## 2024-10-23 ASSESSMENT — FIBROSIS 4 INDEX: FIB4 SCORE: 1.12

## 2024-10-23 ASSESSMENT — PAIN SCALES - GENERAL: PAINLEVEL: 6=MODERATE PAIN

## 2024-10-23 ASSESSMENT — PATIENT HEALTH QUESTIONNAIRE - PHQ9
CLINICAL INTERPRETATION OF PHQ2 SCORE: 3
5. POOR APPETITE OR OVEREATING: 0 - NOT AT ALL
SUM OF ALL RESPONSES TO PHQ QUESTIONS 1-9: 7

## 2024-11-04 ENCOUNTER — PATIENT MESSAGE (OUTPATIENT)
Dept: PHYSICAL MEDICINE AND REHAB | Facility: MEDICAL CENTER | Age: 69
End: 2024-11-04
Payer: COMMERCIAL

## 2024-11-14 ENCOUNTER — HOSPITAL ENCOUNTER (OUTPATIENT)
Dept: RADIOLOGY | Facility: MEDICAL CENTER | Age: 69
End: 2024-11-14
Attending: NURSE PRACTITIONER
Payer: COMMERCIAL

## 2024-11-14 DIAGNOSIS — Z12.31 VISIT FOR SCREENING MAMMOGRAM: ICD-10-CM

## 2024-11-14 PROCEDURE — 77067 SCR MAMMO BI INCL CAD: CPT

## 2024-11-25 ENCOUNTER — OFFICE VISIT (OUTPATIENT)
Dept: MEDICAL GROUP | Facility: MEDICAL CENTER | Age: 69
End: 2024-11-25
Payer: COMMERCIAL

## 2024-11-25 VITALS
DIASTOLIC BLOOD PRESSURE: 64 MMHG | HEIGHT: 62 IN | TEMPERATURE: 96.4 F | BODY MASS INDEX: 34.34 KG/M2 | SYSTOLIC BLOOD PRESSURE: 120 MMHG | WEIGHT: 186.6 LBS | HEART RATE: 74 BPM | OXYGEN SATURATION: 93 %

## 2024-11-25 DIAGNOSIS — E66.9 OBESITY (BMI 30-39.9): ICD-10-CM

## 2024-11-25 PROCEDURE — 99214 OFFICE O/P EST MOD 30 MIN: CPT | Performed by: NURSE PRACTITIONER

## 2024-11-25 PROCEDURE — 3074F SYST BP LT 130 MM HG: CPT | Performed by: NURSE PRACTITIONER

## 2024-11-25 PROCEDURE — 3078F DIAST BP <80 MM HG: CPT | Performed by: NURSE PRACTITIONER

## 2024-11-25 ASSESSMENT — PAIN SCALES - GENERAL: PAINLEVEL_OUTOF10: NO PAIN

## 2024-11-25 ASSESSMENT — FIBROSIS 4 INDEX: FIB4 SCORE: 1.13

## 2024-11-25 NOTE — ASSESSMENT & PLAN NOTE
Started Mounjaro 2 months ago, started on 2.5 mg weekly, now on 10 mg weekly and tolerating fairly well. Reports nausea, diarrhea, constipation, malaise, irritability but feels that it is happing when she forgets to eat for long periods. She has been taking daily Vitamin C and Magnesium Citrate as recommended by pharmacy.     Continues working on dietary changes, increasing activity as able.     Weight is down 12 pounds.

## 2024-11-26 NOTE — PROGRESS NOTES
Subjective:   Shaunna Vieira is a 69 y.o. female here today for weight management follow up    Obesity (BMI 30-39.9)  Started Mounjaro 2 months ago, started on 2.5 mg weekly, now on 10 mg weekly and tolerating fairly well. Reports nausea, diarrhea, constipation, malaise, irritability but feels that it is happing when she forgets to eat for long periods. She has been taking daily Vitamin C and Magnesium Citrate as recommended by pharmacy.     Continues working on dietary changes, increasing activity as able.     Weight is down 12 pounds.        Current medicines (including changes today)  Current Outpatient Medications   Medication Sig Dispense Refill    celecoxib (CELEBREX) 200 MG Cap Take 200 mg by mouth 2 times a day.      TIRZEPATIDE SC Inject 10 mg under the skin every 7 days. Dosage unknown      ketoconazole (NIZORAL) 2 % Cream APPLY 1 APPLICATION TOPICALLY TWO TIMES A DAY AS NEEDED FOR RASH 30 g 3    Amoxicillin 500 MG Tab TAKE 4 TABLETS ORALLY 1 HOUR PRIOR TO DENTAL CLEANING/PROCEDURE 4 Tablet 6    clindamycin (CLEOCIN) 1 % Solution APPLY 1 TO 2 TIMES DAILY TO AFFECTED AREAS 60 mL 11    atorvastatin (LIPITOR) 10 MG Tab TAKE 1 TABLET EVERY EVENING 90 Tablet 3    amitriptyline (ELAVIL) 10 MG Tab TAKE 1 TABLET BY MOUTH NIGHTLY FOR INTERSTITIAL CYSTITIS 90 Tablet 3    Levocetirizine Dihydrochloride 5 MG Tab Take  by mouth every day.      PROCTOSOL HC 2.5 % Cream APPLY 1 PEASIZED AMOUNT TOPICALLY TO AFFECTED AREA TWICE DAILY FOR TWO WEEKS      triamcinolone acetonide (KENALOG) 0.1 % Cream Apply  to affected area(s) as needed.      fluticasone (FLONASE) 50 MCG/ACT nasal spray USE 2 SPRAYS IN EACH NOSTRIL DAILY AS NEEDED 16 g 3    mupirocin (BACTROBAN) 2 % OINT Apply  to affected area(s) as needed.      Glucosamine-Chondroit-Vit C-Mn (GLUCOSAMINE CHONDR 1500 COMPLX PO) Take  by mouth every day.      Multiple Vitamin (MULTI-VITAMIN DAILY PO) Take  by mouth.       No current facility-administered medications  "for this visit.     She  has a past medical history of Abnormal uterine bleeding, Abscess, Acne, Adverse effect of anesthesia, Anesthesia, Breast mass, right, Colon polyps, Dermatitis, Elevated random blood glucose level, Elevated transaminase level, Environmental and seasonal allergies, GERD (gastroesophageal reflux disease), Hyperlipidemia, Migraine, Obesity, Osteoarthritis, Pain (04/19/2018), Snoring, Tinnitus, and Vitamin D deficiency.    ROS   No chest pain, no shortness of breath, no abdominal pain  Positive ROS as per HPI.  All other systems reviewed and are negative.     Objective:     /64   Pulse 74   Temp (!) 35.8 °C (96.4 °F) (Temporal)   Ht 1.575 m (5' 2\")   Wt 84.6 kg (186 lb 9.6 oz)   SpO2 93%  Body mass index is 34.13 kg/m².   Physical Exam:  Constitutional: Alert, no distress.  Skin: Warm, dry, good turgor, no rashes in visible areas.  Eye: Equal, round and reactive, conjunctiva clear, lids normal.  ENMT: Lips without lesions, good dentition  Respiratory: Unlabored respiratory effort  Psych: Alert and oriented x3, normal affect and mood.        Assessment and Plan:   The following treatment plan was discussed    1. Obesity (BMI 30-39.9)  Unstable, improving  Continue tirzepatide 10 mg weekly through compounding pharmacy, refills remaining  Advised starting daily B complex or B6 supplement to help with nausea and fatigue  Increase exercise  Continue with dietary changes      Followup: Return in about 3 months (around 2/25/2025) for Weight Management.    The MA is performing the above orders under the direction of Dr. Lizama    Please note that this dictation was created using voice recognition software. I have made every reasonable attempt to correct obvious errors, but I expect that there are errors of grammar and possibly content that I did not discover before finalizing the note.               "

## 2024-12-12 ENCOUNTER — APPOINTMENT (OUTPATIENT)
Dept: RADIOLOGY | Facility: MEDICAL CENTER | Age: 69
End: 2024-12-12
Attending: PHYSICAL MEDICINE & REHABILITATION
Payer: COMMERCIAL

## 2024-12-12 ENCOUNTER — HOSPITAL ENCOUNTER (OUTPATIENT)
Facility: MEDICAL CENTER | Age: 69
End: 2024-12-12
Attending: PHYSICAL MEDICINE & REHABILITATION | Admitting: PHYSICAL MEDICINE & REHABILITATION
Payer: COMMERCIAL

## 2024-12-12 VITALS
OXYGEN SATURATION: 95 % | HEART RATE: 65 BPM | DIASTOLIC BLOOD PRESSURE: 70 MMHG | TEMPERATURE: 96.6 F | RESPIRATION RATE: 16 BRPM | SYSTOLIC BLOOD PRESSURE: 115 MMHG

## 2024-12-12 PROCEDURE — C1755 CATHETER, INTRASPINAL: HCPCS | Performed by: PHYSICAL MEDICINE & REHABILITATION

## 2024-12-12 PROCEDURE — 160025 RECOVERY II MINUTES (STATS): Performed by: PHYSICAL MEDICINE & REHABILITATION

## 2024-12-12 PROCEDURE — 700117 HCHG RX CONTRAST REV CODE 255: Performed by: PHYSICAL MEDICINE & REHABILITATION

## 2024-12-12 PROCEDURE — 700111 HCHG RX REV CODE 636 W/ 250 OVERRIDE (IP): Performed by: PHYSICAL MEDICINE & REHABILITATION

## 2024-12-12 PROCEDURE — 160048 HCHG OR STATISTICAL LEVEL 1-5: Performed by: PHYSICAL MEDICINE & REHABILITATION

## 2024-12-12 PROCEDURE — 160028 HCHG SURGERY MINUTES - 1ST 30 MINS LEVEL 3: Performed by: PHYSICAL MEDICINE & REHABILITATION

## 2024-12-12 PROCEDURE — 160046 HCHG PACU - 1ST 60 MINS PHASE II: Performed by: PHYSICAL MEDICINE & REHABILITATION

## 2024-12-12 RX ORDER — DEXAMETHASONE SODIUM PHOSPHATE 4 MG/ML
INJECTION, SOLUTION INTRA-ARTICULAR; INTRALESIONAL; INTRAMUSCULAR; INTRAVENOUS; SOFT TISSUE
Status: DISCONTINUED | OUTPATIENT
Start: 2024-12-12 | End: 2024-12-12 | Stop reason: HOSPADM

## 2024-12-12 RX ORDER — LIDOCAINE HYDROCHLORIDE 10 MG/ML
INJECTION, SOLUTION INFILTRATION; PERINEURAL
Status: DISCONTINUED | OUTPATIENT
Start: 2024-12-12 | End: 2024-12-12 | Stop reason: HOSPADM

## 2024-12-12 ASSESSMENT — PAIN DESCRIPTION - PAIN TYPE
TYPE: CHRONIC PAIN
TYPE: CHRONIC PAIN

## 2024-12-12 NOTE — OP REPORT
Date of Service: 12/12/2024     Patient: Shaunna Vieira 69 y.o. female     MRN: [id number]     Physician/s: Kd Wong MD    Pre-operative Diagnosis: right  hip osteoarthritis, hip pain    Post-operative Diagnosis: right hip osteoarthritis, hip pain    Procedure: right diagnostic and  therapeutic intra-articular Hip injection with fluoroscopic guidance    Description of procedure:    The risks, benefits, and alternatives of the procedure were reviewed and discussed with the patient.  Written informed consent was freely obtained. A pre-procedural time-out was conducted by the physician verifying patient’s identity, procedure to be performed, procedure site and side, and allergy verification. Appropriate equipment was determined to be in place for the procedure.         The femoral artery nerve and vein as well as the inguinal ligament were identified with ultrasound and marked with a marking pen.  The entire procedure took place lateral to the femoral vessels and nerve and inferior to the inguinal ligament.    In the procedure suite the patient was placed in a supine position with and the skin was prepped and draped in the usual sterile fashion. A 27-gauge 1.5 inch needle was used with approximately 2 mL of 1% lidocaine for local anesthesia at the junction of the RIGHT femoral head and neck.  A 22g 5 inch needle was placed into skin and advanced under fluoroscopic guidance into the joint space.A minimum amount of contrast was used to clearly demonstrate the outlining of the joint capsule. Following negative aspiration, 5mL of 1% lidocaine with 1mL of 10mg/mL dexamethasone was then injected into the joint, and the needle was subsequently removed intact after restyleted. The patient's hip was wiped with a 4x4 gauze, the area was cleansed with alcohol prep, and a bandaid was applied. There were no complications noted.     Patient had 100% pain relief postprocedure    Follow-up as scheduled      Kd  MD Marvin  Interventional Spine and Pain  Physical Medicine and Rehabilitation  [facility]          CPT  Major joint/bursa:  54194  Fluoroscopic  needle guidance [id number]

## 2024-12-12 NOTE — OR SURGEON
Immediate Post OP Note    Pre op Diagnosis:   (M16.10) severe Arthritis of hip  (M79.604,  M79.605,  G89.29) Chronic pain of both lower extremities    Post op Diagnosis:   (M16.10) severe Arthritis of hip  (M79.604,  M79.605,  G89.29) Chronic pain of both lower extremities      Procedure(s):  DIAGNOSTIC AND THERAPEUTIC FLUOROSCOPICALLY GUIDED INTRA-ARTICULAR RIGHT HIP INJECTION - Wound Class: Clean    Surgeon(s):  Kd Wong M.D.    Anesthesiologist/Type of Anesthesia:  No anesthesia staff entered./Local    Surgical Staff:  Circulator: Marlene Parr R.N.  Scrub Person: Milena Quevedo  Radiology Technologist: Anny Marley  Runner: Priyanka Jenkins R.N.    Specimens removed if any:  * No specimens in log *    Estimated Blood Loss: None    Findings: None    Complications: None        12/12/2024 2:31 PM Kd Wong M.D.

## 2024-12-12 NOTE — H&P
"Physical medicine and rehabilitation preprocedure history & Physical Note    Date  12/12/2024    Primary Care Physician  DUY Herrera    CC  (M16.10) severe Arthritis of hip  (M79.604,  M79.605,  G89.29) Chronic pain of both lower extremities    HPI  This is a 69 y.o. female who presented with chronic hip pain failed conservative treatments here for diagnostic therapeutic injection    See previous notes of Dr. Wong    Past Medical History:   Diagnosis Date    Abnormal uterine bleeding     Abscess     skin rashes, ulcerations    Acne     Adverse effect of anesthesia     prolonged \"fog\"    Anesthesia     confused and drowsy for a week afterwards    Breast mass, right     Colon polyps     Dermatitis     Elevated random blood glucose level     Elevated transaminase level     Environmental and seasonal allergies     GERD (gastroesophageal reflux disease)     Hyperlipidemia     Migraine     Obesity     Osteoarthritis     Pain 04/19/2018    left knee and hip, 0/10    Snoring     no sleep  study    Tinnitus     Vitamin D deficiency        Past Surgical History:   Procedure Laterality Date    PB TOTAL KNEE ARTHROPLASTY Left 7/12/2022    Procedure: LEFT ARTHROPLASTY, KNEE, TOTAL;  Surgeon: Pollo Brooks M.D.;  Location: [facility];  Service: Orthopedics    HYSTEROSCOPY WITH MYOSURE  5/4/2018    Procedure: HYSTEROSCOPY WITH MYOSURE/ WITH POLYPECTOMY;  Surgeon: Lise Perrin M.D.;  Location: SURGERY SAME DAY [facility];  Service: Gynecology    DILATION AND CURETTAGE  5/4/2018    Procedure: DILATION AND CURETTAGE;  Surgeon: Lise Perrin M.D.;  Location: SURGERY SAME DAY [facility];  Service: Gynecology    KNEE ARTHROSCOPY Left 7/17/2015    Procedure: KNEE ARTHROSCOPY;  Surgeon: Fantasma Dominique M.D.;  Location: [facility];  Service:     MENISCECTOMY Left 7/17/2015    Procedure: MENISCECTOMY PARTIAL LATERAL;  Surgeon: Fantasma Dominique M.D.;  Location: [facility]" ORS;  Service:     SYNOVECTOMY Left 7/17/2015    Procedure: SYNOVECTOMY;  Surgeon: Fantasma Dominique M.D.;  Location: SURGERY [facility] ORS;  Service:     OTHER  2015    colonoscopy    BREAST BIOPSY         No current facility-administered medications for this encounter.       Social History     Socioeconomic History    Marital status:      Spouse name: Not on file    Number of children: Not on file    Years of education: Not on file    Highest education level: Doctorate   Occupational History    Not on file   Tobacco Use    Smoking status: Never    Smokeless tobacco: Never   Vaping Use    Vaping status: Never Used   Substance and Sexual Activity    Alcohol use: Yes     Comment: 1 per month    Drug use: No    Sexual activity: Never   Other Topics Concern    Not on file   Social History Narrative    Not on file     Social Drivers of Health     Financial Resource Strain: Low Risk  (11/15/2023)    Overall Financial Resource Strain (CARDIA)     Difficulty of Paying Living Expenses: Not very hard   Food Insecurity: No Food Insecurity (11/15/2023)    Hunger Vital Sign     Worried About Running Out of Food in the Last Year: Never true     Ran Out of Food in the Last Year: Never true   Transportation Needs: Unmet Transportation Needs (11/15/2023)    PRAPARE - Transportation     Lack of Transportation (Medical): Yes     Lack of Transportation (Non-Medical): Yes   Physical Activity: Insufficiently Active (11/15/2023)    Exercise Vital Sign     Days of Exercise per Week: 2 days     Minutes of Exercise per Session: 20 min   Stress: Stress Concern Present (11/15/2023)    [place] [employer] - Occupational Stress Questionnaire     Feeling of Stress : Very much   Social Connections: Moderately Integrated (11/15/2023)    Social Connection and Isolation Panel [NHANES]     Frequency of Communication with Friends and Family: More than three times a week     Frequency of Social Gatherings with Friends and  Family: Once a week     Attends Religion Services: 1 to 4 times per year     Active Member of Clubs or Organizations: Yes     Attends Club or Organization Meetings: 1 to 4 times per year     Marital Status:    Intimate Partner Violence: Not on file   Housing Stability: Low Risk  (11/15/2023)    Housing Stability Vital Sign     Unable to Pay for Housing in the Last Year: No     Number of Places Lived in the Last Year: 1     Unstable Housing in the Last Year: No       Family History   Problem Relation Age of Onset    Other Father         PARKINSON'S    Cancer Father         lymphoma    Diabetes Father     Breast Cancer Mother     Other Mother         BLINDNESS/MAC DEG/SOKER    Arthritis Mother     Cancer Mother     Stroke Mother         TIA    Arthritis Brother     Other Other         ASBERGERS    Cancer Maternal Grandmother         asbestos       Allergies  Morphine, Nystatin, Prilosec [omeprazole], Sulfa drugs, Pravastatin, Bio-35 gluten-free [actical], Cobalt, and Lanolin    Review of Systems  Comprehensive review of systems was negative       Physical Exam  Constitutional:       General: She is not in acute distress.     Appearance: Normal appearance. She is not ill-appearing, toxic-appearing or diaphoretic.   Cardiovascular:      Rate and Rhythm: Normal rate and regular rhythm.      Pulses: Normal pulses.      Heart sounds: Normal heart sounds.   Pulmonary:      Effort: Pulmonary effort is normal.      Breath sounds: Normal breath sounds.   Abdominal:      General: Abdomen is flat. Bowel sounds are normal. There is no distension.      Palpations: Abdomen is soft.      Tenderness: There is no abdominal tenderness. There is no guarding or rebound.   Skin:     General: Skin is warm and dry.      Capillary Refill: Capillary refill takes less than 2 seconds.   Neurological:      Mental Status: She is alert.          Vital Signs  Blood Pressure : 105/73   Temperature: 35.9 °C (96.6 °F)   Pulse: 62    Respiration: 16   Pulse Oximetry: 92 %     Vitals reviewed    Labs:                    Radiology:  DX-PORTABLE FLUOROSCOPY < 1 HOUR Reason For Exam: pain    (Results Pending)         Assessment/Plan:  (M16.10) severe Arthritis of hip  (M79.604,  M79.605,  G89.29) Chronic pain of both lower extremities  Procedure(s):  DIAGNOSTIC AND THERAPEUTIC FLUOROSCOPICALLY GUIDED INTRA-ARTICULAR RIGHT HIP INJECTION

## 2024-12-13 ENCOUNTER — TELEPHONE (OUTPATIENT)
Dept: PHYSICAL MEDICINE AND REHAB | Facility: MEDICAL CENTER | Age: 69
End: 2024-12-13
Payer: COMMERCIAL

## 2024-12-13 NOTE — TELEPHONE ENCOUNTER
"Called for post-sp check-up. Pt reported the following regarding the procedure site: Diagnostic and therapeutic Fluoroscopically guided intra-articular RIGHT hip injection    Change in pain?: improved, \"Dr. Wong did a fabulous job but I do plan to file a complaint against [facility] for not notifying me about additional technician in room without even asking me\". Pt stressed how pleased she was with Dr. Wong's work, but disappointed by the lack of transparency as she saw it. Pt has removed initial bandage and asked my opinion about treatment. I recommended pt not submerge in water for 72 hours. Verbally confirmed with Dr. Castillo that pt was clear to shower and a that new bandage was not required.     Concerns?: see above    Confirmed [facility] appt?: yes, 1/9/25  "

## 2025-01-09 ENCOUNTER — APPOINTMENT (OUTPATIENT)
Dept: PHYSICAL MEDICINE AND REHAB | Facility: MEDICAL CENTER | Age: 70
End: 2025-01-09
Payer: COMMERCIAL

## 2025-01-09 VITALS
SYSTOLIC BLOOD PRESSURE: 95 MMHG | WEIGHT: 176 LBS | HEART RATE: 79 BPM | TEMPERATURE: 96.8 F | HEIGHT: 63 IN | DIASTOLIC BLOOD PRESSURE: 72 MMHG | OXYGEN SATURATION: 92 % | BODY MASS INDEX: 31.18 KG/M2

## 2025-01-09 DIAGNOSIS — M16.10 ARTHRITIS OF HIP: ICD-10-CM

## 2025-01-09 DIAGNOSIS — M54.42 CHRONIC BILATERAL LOW BACK PAIN WITH BILATERAL SCIATICA: ICD-10-CM

## 2025-01-09 DIAGNOSIS — G89.29 CHRONIC PAIN OF BOTH LOWER EXTREMITIES: ICD-10-CM

## 2025-01-09 DIAGNOSIS — M79.604 CHRONIC PAIN OF BOTH LOWER EXTREMITIES: ICD-10-CM

## 2025-01-09 DIAGNOSIS — Z96.642 HISTORY OF LEFT HIP REPLACEMENT: ICD-10-CM

## 2025-01-09 DIAGNOSIS — Z71.82 EXERCISE COUNSELING: ICD-10-CM

## 2025-01-09 DIAGNOSIS — G89.29 CHRONIC BILATERAL LOW BACK PAIN WITH BILATERAL SCIATICA: ICD-10-CM

## 2025-01-09 DIAGNOSIS — M54.41 CHRONIC BILATERAL LOW BACK PAIN WITH BILATERAL SCIATICA: ICD-10-CM

## 2025-01-09 DIAGNOSIS — M79.605 CHRONIC PAIN OF BOTH LOWER EXTREMITIES: ICD-10-CM

## 2025-01-09 DIAGNOSIS — E66.9 OBESITY (BMI 30-39.9): ICD-10-CM

## 2025-01-09 PROCEDURE — 1125F AMNT PAIN NOTED PAIN PRSNT: CPT | Performed by: PHYSICAL MEDICINE & REHABILITATION

## 2025-01-09 PROCEDURE — 99214 OFFICE O/P EST MOD 30 MIN: CPT | Performed by: PHYSICAL MEDICINE & REHABILITATION

## 2025-01-09 PROCEDURE — 3078F DIAST BP <80 MM HG: CPT | Performed by: PHYSICAL MEDICINE & REHABILITATION

## 2025-01-09 PROCEDURE — 3074F SYST BP LT 130 MM HG: CPT | Performed by: PHYSICAL MEDICINE & REHABILITATION

## 2025-01-09 ASSESSMENT — FIBROSIS 4 INDEX: FIB4 SCORE: 1.13

## 2025-01-09 ASSESSMENT — PATIENT HEALTH QUESTIONNAIRE - PHQ9: CLINICAL INTERPRETATION OF PHQ2 SCORE: 0

## 2025-01-09 ASSESSMENT — PAIN SCALES - GENERAL: PAINLEVEL_OUTOF10: 1=MINIMAL PAIN

## 2025-01-09 NOTE — PROGRESS NOTES
"Follow up patient note  Interventional spine and Pain  Physiatry (physical medicine and  Rehabilitation)       Chief complaint:   Chief Complaint   Patient presents with    Follow-Up     Hip pain          HISTORY    Please see new patient note by Dr Wong,  for more details.     HPI  Patient identification: Shaunna Vieira ,  1955,   With Diagnoses of severe Arthritis of hip, Chronic pain of both lower extremities, History of left hip replacement Dr Mallory, and Chronic bilateral low back pain with bilateral sciatica were pertinent to this visit.     History of Present Illness    The patient is a 69-year-old female presenting for a follow-up visit. She is status post right intra-articular hip injection administered on 2024.    The patient reports no significant improvement in her hip condition following the injection, with only transient relief lasting approximately two days.  However she did have 100% improvement in hip pain for 2 days following the injection.  There was no long-lasting pain relief from the steroid phase.  The pain is primarily nocturnal, exacerbated by lying on either side, but does not impede ambulation or sitting. She has a scheduled appointment with Dr. Mallory in two months and expresses a desire to commence physical therapy to enhance leg strength prior to the planned procedure. She reports that Celecoxib (Celebrex) is effective in alleviating her symptoms.    MEDICATIONS  - Celebrex           ROS Red Flags :   Fever, Chills, Sweats: Denies  Involuntary Weight Loss: Denies  Bowel/Bladder Incontinence: Denies  Saddle Anesthesia: Denies        PMHx:   Past Medical History:   Diagnosis Date    Abnormal uterine bleeding     Abscess     skin rashes, ulcerations    Acne     Adverse effect of anesthesia     prolonged \"fog\"    Anesthesia     confused and drowsy for a week afterwards    Breast mass, right     Colon polyps     Dermatitis     Elevated random blood glucose level     " Elevated transaminase level     Environmental and seasonal allergies     GERD (gastroesophageal reflux disease)     Hyperlipidemia     Migraine     Obesity     Osteoarthritis     Pain 04/19/2018    left knee and hip, 0/10    Snoring     no sleep  study    Tinnitus     Vitamin D deficiency        PSHx:   Past Surgical History:   Procedure Laterality Date    BLOCK EPIDURAL STEROID INJECTION Right 12/12/2024    Procedure: DIAGNOSTIC AND THERAPEUTIC FLUOROSCOPICALLY GUIDED INTRA-ARTICULAR RIGHT HIP INJECTION;  Surgeon: Kd Wong M.D.;  Location: [facility];  Service: Pain Management    PB TOTAL KNEE ARTHROPLASTY Left 7/12/2022    Procedure: LEFT ARTHROPLASTY, KNEE, TOTAL;  Surgeon: Pollo Brooks M.D.;  Location: [facility];  Service: Orthopedics    HYSTEROSCOPY WITH MYOSURE  5/4/2018    Procedure: HYSTEROSCOPY WITH MYOSURE/ WITH POLYPECTOMY;  Surgeon: Lise Perrin M.D.;  Location: SURGERY SAME DAY [facility];  Service: Gynecology    DILATION AND CURETTAGE  5/4/2018    Procedure: DILATION AND CURETTAGE;  Surgeon: Lise Perrin M.D.;  Location: SURGERY SAME DAY [facility];  Service: Gynecology    KNEE ARTHROSCOPY Left 7/17/2015    Procedure: KNEE ARTHROSCOPY;  Surgeon: Fantasma Dominique M.D.;  Location: [facility];  Service:     MENISCECTOMY Left 7/17/2015    Procedure: MENISCECTOMY PARTIAL LATERAL;  Surgeon: Fantasma Dominique M.D.;  Location: [facility];  Service:     SYNOVECTOMY Left 7/17/2015    Procedure: SYNOVECTOMY;  Surgeon: Fantasma Dominique M.D.;  Location: [facility];  Service:     OTHER  2015    colonoscopy    BREAST BIOPSY         Family history   Family History   Problem Relation Age of Onset    Other Father         PARKINSON'S    Cancer Father         lymphoma    Diabetes Father     Breast Cancer Mother     Other Mother         BLINDNESS/MAC DEG/SOKER    Arthritis Mother     Cancer Mother     Stroke Mother         TIA     Arthritis Brother     Other Other         ASBERGERS    Cancer Maternal Grandmother         asbestos         Medications:   Outpatient Medications Marked as Taking for the 1/9/25 encounter (Office Visit) with Kd Wong M.D.   Medication Sig Dispense Refill    celecoxib (CELEBREX) 200 MG Cap Take 200 mg by mouth 2 times a day.      TIRZEPATIDE SC Inject 10 mg under the skin every 7 days. Dosage unknown      ketoconazole (NIZORAL) 2 % Cream APPLY 1 APPLICATION TOPICALLY TWO TIMES A DAY AS NEEDED FOR RASH 30 g 3    Amoxicillin 500 MG Tab TAKE 4 TABLETS ORALLY 1 HOUR PRIOR TO DENTAL CLEANING/PROCEDURE 4 Tablet 6    clindamycin (CLEOCIN) 1 % Solution APPLY 1 TO 2 TIMES DAILY TO AFFECTED AREAS 60 mL 11    atorvastatin (LIPITOR) 10 MG Tab TAKE 1 TABLET EVERY EVENING 90 Tablet 3    amitriptyline (ELAVIL) 10 MG Tab TAKE 1 TABLET BY MOUTH NIGHTLY FOR INTERSTITIAL CYSTITIS 90 Tablet 3    Levocetirizine Dihydrochloride 5 MG Tab Take  by mouth every day.      PROCTOSOL HC 2.5 % Cream APPLY 1 PEASIZED AMOUNT TOPICALLY TO AFFECTED AREA TWICE DAILY FOR TWO WEEKS      triamcinolone acetonide (KENALOG) 0.1 % Cream Apply  to affected area(s) as needed.      fluticasone (FLONASE) 50 MCG/ACT nasal spray USE 2 SPRAYS IN EACH NOSTRIL DAILY AS NEEDED 16 g 3    mupirocin (BACTROBAN) 2 % OINT Apply  to affected area(s) as needed.      Glucosamine-Chondroit-Vit C-Mn (GLUCOSAMINE CHONDR 1500 COMPLX PO) Take  by mouth every day.      Multiple Vitamin (MULTI-VITAMIN DAILY PO) Take  by mouth.          Current Outpatient Medications on File Prior to Visit   Medication Sig Dispense Refill    celecoxib (CELEBREX) 200 MG Cap Take 200 mg by mouth 2 times a day.      TIRZEPATIDE SC Inject 10 mg under the skin every 7 days. Dosage unknown      ketoconazole (NIZORAL) 2 % Cream APPLY 1 APPLICATION TOPICALLY TWO TIMES A DAY AS NEEDED FOR RASH 30 g 3    Amoxicillin 500 MG Tab TAKE 4 TABLETS ORALLY 1 HOUR PRIOR TO DENTAL CLEANING/PROCEDURE 4 Tablet 6     clindamycin (CLEOCIN) 1 % Solution APPLY 1 TO 2 TIMES DAILY TO AFFECTED AREAS 60 mL 11    atorvastatin (LIPITOR) 10 MG Tab TAKE 1 TABLET EVERY EVENING 90 Tablet 3    amitriptyline (ELAVIL) 10 MG Tab TAKE 1 TABLET BY MOUTH NIGHTLY FOR INTERSTITIAL CYSTITIS 90 Tablet 3    Levocetirizine Dihydrochloride 5 MG Tab Take  by mouth every day.      PROCTOSOL HC 2.5 % Cream APPLY 1 PEASIZED AMOUNT TOPICALLY TO AFFECTED AREA TWICE DAILY FOR TWO WEEKS      triamcinolone acetonide (KENALOG) 0.1 % Cream Apply  to affected area(s) as needed.      fluticasone (FLONASE) 50 MCG/ACT nasal spray USE 2 SPRAYS IN EACH NOSTRIL DAILY AS NEEDED 16 g 3    mupirocin (BACTROBAN) 2 % OINT Apply  to affected area(s) as needed.      Glucosamine-Chondroit-Vit C-Mn (GLUCOSAMINE CHONDR 1500 COMPLX PO) Take  by mouth every day.      Multiple Vitamin (MULTI-VITAMIN DAILY PO) Take  by mouth.       No current facility-administered medications on file prior to visit.         Allergies:   Allergies   Allergen Reactions    Morphine Shortness of Breath     Also get hallucinations    Nystatin Rash     .    Prilosec [Omeprazole]     Sulfa Drugs Unspecified     Pt doesn't know what happens, had as a child    Pravastatin Myalgia    Bio-35 Gluten-Free [Actical] Anxiety     Causes problems with gut undiagnosed    [place] Unspecified     Positive skin test    Lanolin Unspecified     Positive skin test       Social Hx:   Social History     Socioeconomic History    Marital status:      Spouse name: Not on file    Number of children: Not on file    Years of education: Not on file    Highest education level: Doctorate   Occupational History    Not on file   Tobacco Use    Smoking status: Never    Smokeless tobacco: Never   Vaping Use    Vaping status: Never Used   Substance and Sexual Activity    Alcohol use: Yes     Comment: 1 per month    Drug use: No    Sexual activity: Never   Other Topics Concern    Not on file   Social History Narrative    Not on file  "    Social Drivers of Health     Financial Resource Strain: Low Risk  (11/15/2023)    Overall Financial Resource Strain (CARDIA)     Difficulty of Paying Living Expenses: Not very hard   Food Insecurity: No Food Insecurity (11/15/2023)    Hunger Vital Sign     Worried About Running Out of Food in the Last Year: Never true     Ran Out of Food in the Last Year: Never true   Transportation Needs: Unmet Transportation Needs (11/15/2023)    PRAPARE - Transportation     Lack of Transportation (Medical): Yes     Lack of Transportation (Non-Medical): Yes   Physical Activity: Insufficiently Active (11/15/2023)    Exercise Vital Sign     Days of Exercise per Week: 2 days     Minutes of Exercise per Session: 20 min   Stress: Stress Concern Present (11/15/2023)    [place] [employer] - Occupational Stress Questionnaire     Feeling of Stress : Very much   Social Connections: Moderately Integrated (11/15/2023)    Social Connection and Isolation Panel [NHANES]     Frequency of Communication with Friends and Family: More than three times a week     Frequency of Social Gatherings with Friends and Family: Once a week     Attends [employer] Services: 1 to 4 times per year     Active Member of Clubs or Organizations: Yes     Attends Club or Organization Meetings: 1 to 4 times per year     Marital Status:    Intimate Partner Violence: Not on file   Housing Stability: Low Risk  (11/15/2023)    Housing Stability Vital Sign     Unable to Pay for Housing in the Last Year: No     Number of Places Lived in the Last Year: 1     Unstable Housing in the Last Year: No         EXAMINATION     Physical Exam:   Vitals: BP 95/72 (BP Location: Right arm, Patient Position: Sitting, BP Cuff Size: Large adult)   Pulse 79   Temp 36 °C (96.8 °F) (Temporal)   Ht 1.594 m (5' 2.75\")   Wt 79.8 kg (176 lb)   SpO2 92%     Constitutional:   Body Habitus: Body mass index is 31.43 kg/m².  Cooperation: Fully cooperates with " "exam  Appearance: Well-groomed no disheveled    Respiratory-  breathing comfortable on room air, no audible wheezing  Cardiovascular- capillary refills less than 2 seconds. No lower extremity edema is noted.   Psychiatric- alert and oriented ×3. Normal affect.    MSK and Neuro: -    FAIRs maneuver and Halima's maneuver are positive in the right hip for reproducing hip pain.          MEDICAL DECISION MAKING    DATA    Labs:   Lab Results   Component Value Date/Time    SODIUM 140 08/14/2024 05:09 AM    SODIUM 137 06/28/2022 11:17 AM    POTASSIUM 4.6 08/14/2024 05:09 AM    POTASSIUM 4.1 06/28/2022 11:17 AM    CHLORIDE 103 08/14/2024 05:09 AM    CHLORIDE 100 06/28/2022 11:17 AM    CO2 26 08/14/2024 05:09 AM    CO2 27 06/28/2022 11:17 AM    GLUCOSE 109 (H) 08/14/2024 05:09 AM    GLUCOSE 99 06/28/2022 11:17 AM    BUN 18 08/14/2024 05:09 AM    BUN 13 06/28/2022 11:17 AM    CREATININE 0.89 08/14/2024 05:09 AM    CREATININE 0.75 06/28/2022 11:17 AM    BUNCREATRAT 20 08/14/2024 05:09 AM        No results found for: \"PROTHROMBTM\", \"INR\"     Lab Results   Component Value Date/Time    WBC 5.2 10/27/2023 05:21 AM    WBC 5.2 06/28/2022 11:17 AM    RBC 4.94 10/27/2023 05:21 AM    RBC 4.85 06/28/2022 11:17 AM    HEMOGLOBIN 14.2 10/27/2023 05:21 AM    HEMOGLOBIN 14.2 06/28/2022 11:17 AM    HEMATOCRIT 43.1 10/27/2023 05:21 AM    HEMATOCRIT 42.1 06/28/2022 11:17 AM    MCV 87 10/27/2023 05:21 AM    MCV 86.8 06/28/2022 11:17 AM    MCH 28.7 10/27/2023 05:21 AM    MCH 29.3 06/28/2022 11:17 AM    MCHC 32.9 10/27/2023 05:21 AM    MCHC 33.7 06/28/2022 11:17 AM    MPV 10.2 06/28/2022 11:17 AM    NEUTSPOLYS 61 10/27/2023 05:21 AM    NEUTSPOLYS 57.60 04/19/2018 03:55 PM    LYMPHOCYTES 27 10/27/2023 05:21 AM    LYMPHOCYTES 29.90 04/19/2018 03:55 PM    MONOCYTES 9 10/27/2023 05:21 AM    MONOCYTES 9.10 04/19/2018 03:55 PM    EOSINOPHILS 2 10/27/2023 05:21 AM    EOSINOPHILS 2.30 04/19/2018 03:55 PM    BASOPHILS 1 10/27/2023 05:21 AM    BASOPHILS 0.90 " 04/19/2018 03:55 PM        Lab Results   Component Value Date/Time    HBA1C 5.7 (H) 08/14/2024 05:09 AM          Imaging:   I personally reviewed following images      X-ray hips 10/8/2024  Severe degenerative changes in the right hip.  Left hip total hip arthroplasty.    XR hips 9/18/2023  1.  There is severe bilateral hip joint osteoarthritis, left greater than right.    I reviewed the following radiology reports          Xray hips 10/11/2023  IMPRESSION:   1.  There is severe bilateral hip joint osteoarthritis, left greater than right.      Results for orders placed in visit on 11/03/23    MR-LUMBAR SPINE-W/O    Impression  1.  Multifocal degenerative disease in the lumbar and visualized lower thoracic thoracic spine as described above.  2.  Severe central canal stenosis with cord impingement at the level of T10-11.  3.  Complicated cyst in the right kidney.  4.  Gallstones                                                                                            Results for orders placed during the hospital encounter of 07/05/10    DX-CHEST-2 VIEWS         Results for orders placed during the hospital encounter of 07/06/05    DX-FOOT-COMPLETE 3+    Impression  IMPRESSION:    1. MILD GREAT TOE METATARSOPHALANGEAL JOINT OSTEOARTHRITIS.  2. WELL-DEFINED PLANTAR HEEL SPUR WITH NO EVIDENCE OF INFLAMMATORY  PROCESS.      Read By CLEMENT ANG MD on Jul 6 2005  3:36PM  : ILEANA Transcription Date: Jul 7 2005  3:22AM  THIS DOCUMENT HAS BEEN ELECTRONICALLY SIGNED BY: CLEMENT ANG MD on  Jul 7 2005 10:04AM        Results for orders placed during the hospital encounter of 09/18/08    DX-HIP-COMPLETE - UNILATERAL 2+    Impression  IMPRESSION:    NO RADIOGRAPHIC EVIDENCE OF ACUTE OR SUBACUTE TRAUMATIC INJURY AND  MARGINAL OSTEOPHYTE FORMATION IS NOT SEEN.      TJS/bht    Read By MENA COYLE MD on Sep 18 2008  4:10PM  : BHJASMIN Transcription Date: Sep 19 2008  3:04PM  THIS DOCUMENT  HAS BEEN ELECTRONICALLY SIGNED BY: MENA COYLE MD on  Sep 20 2008  5:31PM     Results for orders placed during the hospital encounter of 07/12/22    DX-KNEE 2- LEFT    Impression  1. Status post left knee replacement without evidence of hardware complication.   Results for orders placed in visit on 07/07/23    DX-KNEE 3 VIEWS LEFT    Impression  TKA is unremarkable. Normal exam.    Results for orders placed in visit on 05/19/22    DX-KNEE COMPLETE 4+ LEFT   Results for orders placed in visit on 10/11/23    DX-LUMBAR SPINE-2 OR 3 VIEWS    Impression  1.  There is a dextroconvex curvature of the lumbar spine.  2.  There is degenerative disc disease with endplate spurring at the L2-3 through L4-5 levels predominantly.  3.  There is facet arthropathy in the lower 3 levels of the lumbar spine.                Results for orders placed in visit on 07/07/23    DX-TIBIA AND FIBULA LEFT    Impression  Normal exam           ELECTRODIAGNOSTICS  EMG/nerve conduction study performed on   10/18/2023 done by Dr. Pierre  This is marginally normal study.   1. There is no needle EMG evidence of lumbar radiculopathy on the left.  Please note, however it radiculopathy screening via needle EMG assesses motor nerve fibers only, and does not rule out radiculopathy side effects only sensory nerve roots.  2. Possibility of length-dependent neuropathy but only one abnormal limb.     10/26/2023 done by Dr. Pierre  This is an abnormal study.   1. There is needle EMG evidence of chronic S1 lumbar radiculopathy on the right.    2. From the prior study, there is no needle EMG evidence of lumbar radiculopathy on the left.  Please note, however it radiculopathy screening via needle EMG assesses motor nerve fibers only, and does not rule out radiculopathy side effects only sensory nerve roots.    DIAGNOSIS   Visit Diagnoses     ICD-10-CM   1. severe Arthritis of hip  M16.10   2. Chronic pain of both lower extremities  M79.604    M79.605     G89.29   3. History of left hip replacement Dr Mallory  Z96.642   4. Chronic bilateral low back pain with bilateral sciatica  M54.42    M54.41    G89.29           ASSESSMENT and PLAN:     Shaunna Vieira  1955 female      Shaunna was seen today for follow-up.    Diagnoses and all orders for this visit:    severe Arthritis of hip  -     Referral to Physical Therapy    Chronic pain of both lower extremities  -     Referral to Physical Therapy    History of left hip replacement Dr Mallory  -     Referral to Physical Therapy    Chronic bilateral low back pain with bilateral sciatica  -     Referral to Physical Therapy        Assessment & Plan      The patient's right hip pain persists despite an intra-articular hip injection administered on 2024. She experienced only 2 days of relief post-injection. The pain is primarily bothersome during sleep, especially when lying on either side. There is no pain reported during walking or sitting. She continues to take Celebrex, which provides significant relief. Severe arthritis of the right hip joint was noted from previous imaging. She is advised to continue her scheduled appointment with her hip replacement surgeon Dr. Mallory in 2 months to discuss potential hip replacement surgery. A referral for physical therapy will be initiated to strengthen her leg prior to the potential surgery.    PROCEDURE  The patient underwent a right intra-articular hip injection on 2024.    Follow-up as needed with me        Follow up: After the above procedure for diagnostic and therapeutic purposes    Thank you for allowing me to participate in the care of this patient. If you have any questions please not hesitate to contact me.             Please note that this dictation was created using voice recognition software. I have made every reasonable attempt to correct obvious errors but there may be errors of grammar and content that I may have overlooked prior to finalization of  this note.      Kd Wong MD  Interventional Spine and Sports Physiatry  Physical Medicine and Rehabilitation  [facility]

## 2025-03-03 LAB
25(OH)D3+25(OH)D2 SERPL-MCNC: 39.4 NG/ML (ref 30–100)
ALBUMIN SERPL-MCNC: 4.5 G/DL (ref 3.9–4.9)
ALP SERPL-CCNC: 76 IU/L (ref 44–121)
ALT SERPL-CCNC: 14 IU/L (ref 0–32)
AST SERPL-CCNC: 12 IU/L (ref 0–40)
BASOPHILS # BLD AUTO: 0 X10E3/UL (ref 0–0.2)
BASOPHILS NFR BLD AUTO: 1 %
BILIRUB SERPL-MCNC: 1 MG/DL (ref 0–1.2)
BUN SERPL-MCNC: 19 MG/DL (ref 8–27)
BUN/CREAT SERPL: 23 (ref 12–28)
CALCIUM SERPL-MCNC: 9.4 MG/DL (ref 8.7–10.3)
CHLORIDE SERPL-SCNC: 103 MMOL/L (ref 96–106)
CHOLEST SERPL-MCNC: 150 MG/DL (ref 100–199)
CO2 SERPL-SCNC: 23 MMOL/L (ref 20–29)
CREAT SERPL-MCNC: 0.81 MG/DL (ref 0.57–1)
EGFRCR SERPLBLD CKD-EPI 2021: 79 ML/MIN/1.73
EOSINOPHIL # BLD AUTO: 0.1 X10E3/UL (ref 0–0.4)
EOSINOPHIL NFR BLD AUTO: 1 %
ERYTHROCYTE [DISTWIDTH] IN BLOOD BY AUTOMATED COUNT: 13 % (ref 11.7–15.4)
GLOBULIN SER CALC-MCNC: 2.4 G/DL (ref 1.5–4.5)
GLUCOSE SERPL-MCNC: 86 MG/DL (ref 70–99)
HBA1C MFR BLD: 5.6 % (ref 4.8–5.6)
HCT VFR BLD AUTO: 43.3 % (ref 34–46.6)
HDLC SERPL-MCNC: 46 MG/DL
HGB BLD-MCNC: 14.3 G/DL (ref 11.1–15.9)
IMM GRANULOCYTES # BLD AUTO: 0 X10E3/UL (ref 0–0.1)
IMM GRANULOCYTES NFR BLD AUTO: 0 %
IMMATURE CELLS  115398: NORMAL
LDL CALC COMMENT:: NORMAL
LDLC SERPL CALC-MCNC: 89 MG/DL (ref 0–99)
LYMPHOCYTES # BLD AUTO: 1.2 X10E3/UL (ref 0.7–3.1)
LYMPHOCYTES NFR BLD AUTO: 23 %
MCH RBC QN AUTO: 29.4 PG (ref 26.6–33)
MCHC RBC AUTO-ENTMCNC: 33 G/DL (ref 31.5–35.7)
MCV RBC AUTO: 89 FL (ref 79–97)
MONOCYTES # BLD AUTO: 0.4 X10E3/UL (ref 0.1–0.9)
MONOCYTES NFR BLD AUTO: 8 %
MORPHOLOGY BLD-IMP: NORMAL
NEUTROPHILS # BLD AUTO: 3.4 X10E3/UL (ref 1.4–7)
NEUTROPHILS NFR BLD AUTO: 67 %
NRBC BLD AUTO-RTO: NORMAL %
PLATELET # BLD AUTO: 249 X10E3/UL (ref 150–450)
POTASSIUM SERPL-SCNC: 4.1 MMOL/L (ref 3.5–5.2)
PROT SERPL-MCNC: 6.9 G/DL (ref 6–8.5)
RBC # BLD AUTO: 4.87 X10E6/UL (ref 3.77–5.28)
SODIUM SERPL-SCNC: 141 MMOL/L (ref 134–144)
TRIGL SERPL-MCNC: 79 MG/DL (ref 0–149)
TSH SERPL DL<=0.005 MIU/L-ACNC: 1.64 UIU/ML (ref 0.45–4.5)
VLDLC SERPL CALC-MCNC: 15 MG/DL (ref 5–40)
WBC # BLD AUTO: 5.1 X10E3/UL (ref 3.4–10.8)

## 2025-03-16 ENCOUNTER — RESULTS FOLLOW-UP (OUTPATIENT)
Dept: MEDICAL GROUP | Facility: IMAGING CENTER | Age: 70
End: 2025-03-16
Payer: COMMERCIAL

## 2025-03-17 DIAGNOSIS — E78.00 PURE HYPERCHOLESTEROLEMIA: ICD-10-CM

## 2025-03-18 RX ORDER — ATORVASTATIN CALCIUM 10 MG/1
10 TABLET, FILM COATED ORAL EVERY EVENING
Qty: 90 TABLET | Refills: 0 | Status: SHIPPED | OUTPATIENT
Start: 2025-03-18

## 2025-03-18 NOTE — TELEPHONE ENCOUNTER
Received request via: Pharmacy    Was the patient seen in the last year in this department? Yes    Does the patient have an active prescription (recently filled or refills available) for medication(s) requested? No    [facility]     Does the patient have [facility] and need 100-day supply? (This applies to ALL medications) Patient does not have SCP

## 2025-05-17 DIAGNOSIS — L30.9 DERMATITIS: ICD-10-CM

## 2025-05-20 RX ORDER — KETOCONAZOLE 20 MG/G
CREAM TOPICAL
Qty: 30 G | Refills: 3 | Status: SHIPPED | OUTPATIENT
Start: 2025-05-20

## 2025-05-20 NOTE — TELEPHONE ENCOUNTER
Received request via: Pharmacy    Was the patient seen in the last year in this department? Yes    Does the patient have an active prescription (recently filled or refills available) for medication(s) requested? No    [facility]     Does the patient have [facility] and need 100-day supply? (This applies to ALL medications) Patient does not have SCP

## 2025-06-03 ENCOUNTER — OFFICE VISIT (OUTPATIENT)
Dept: MEDICAL GROUP | Facility: IMAGING CENTER | Age: 70
End: 2025-06-03
Payer: COMMERCIAL

## 2025-06-03 VITALS
HEART RATE: 68 BPM | HEIGHT: 62 IN | DIASTOLIC BLOOD PRESSURE: 60 MMHG | WEIGHT: 169.31 LBS | TEMPERATURE: 97.2 F | BODY MASS INDEX: 31.16 KG/M2 | SYSTOLIC BLOOD PRESSURE: 92 MMHG | OXYGEN SATURATION: 94 %

## 2025-06-03 DIAGNOSIS — Z01.818 PREOPERATIVE CLEARANCE: Primary | ICD-10-CM

## 2025-06-03 DIAGNOSIS — Z12.31 SCREENING MAMMOGRAM, ENCOUNTER FOR: ICD-10-CM

## 2025-06-03 DIAGNOSIS — I87.2 VENOUS INSUFFICIENCY: ICD-10-CM

## 2025-06-03 DIAGNOSIS — I78.1 SPIDER VEINS: ICD-10-CM

## 2025-06-03 DIAGNOSIS — R73.03 PREDIABETES: ICD-10-CM

## 2025-06-03 DIAGNOSIS — M16.11 PRIMARY OSTEOARTHRITIS OF RIGHT HIP: ICD-10-CM

## 2025-06-03 DIAGNOSIS — R93.1 ELEVATED CORONARY ARTERY CALCIUM SCORE: ICD-10-CM

## 2025-06-03 DIAGNOSIS — E78.00 PURE HYPERCHOLESTEROLEMIA: ICD-10-CM

## 2025-06-03 PROCEDURE — 3078F DIAST BP <80 MM HG: CPT | Performed by: NURSE PRACTITIONER

## 2025-06-03 PROCEDURE — 99214 OFFICE O/P EST MOD 30 MIN: CPT | Performed by: NURSE PRACTITIONER

## 2025-06-03 PROCEDURE — 3074F SYST BP LT 130 MM HG: CPT | Performed by: NURSE PRACTITIONER

## 2025-06-03 RX ORDER — RUXOLITINIB 15 MG/G
CREAM TOPICAL PRN
COMMUNITY
Start: 2025-06-01

## 2025-06-03 ASSESSMENT — FIBROSIS 4 INDEX: FIB4 SCORE: 0.89

## 2025-06-03 NOTE — PROGRESS NOTES
REASON FOR VISIT: Pre-Op Consultation  Consultation Requested by: Right Total Hip Arthroplasty   Procedure date and type: TBD    History of condition for which surgery is planned:Osteoarthritis right hip    Current chronic conditions: does not have any pertinent problems on file.  Past medical history:  has a past medical history of Abnormal uterine bleeding, Abscess, Acne, Adverse effect of anesthesia, Anesthesia, Breast mass, right, Colon polyps, Dermatitis, Elevated random blood glucose level, Elevated transaminase level, Environmental and seasonal allergies, GERD (gastroesophageal reflux disease), Hyperlipidemia, Migraine, Obesity, Osteoarthritis, Pain (04/19/2018), Snoring, Tinnitus, and Vitamin D deficiency.. Negative for: CAD, SBE, CVA, TIA, DVT, PE, bleeding requiring transfusion, intubation.  Surgical and anesthetic history:  has a past surgical history that includes other (2015); knee arthroscopy (Left, 7/17/2015); meniscectomy (Left, 7/17/2015); synovectomy (Left, 7/17/2015); breast biopsy; hysteroscopy with myosure (5/4/2018); dilation and curettage (5/4/2018); pr total knee arthroplasty (Left, 7/12/2022); and block epidural steroid injection (Right, 12/12/2024). Prior surgery without complication, bleeding, reaction to anesthetic, prolonged recovery  Habits: Social History[1]  Allergies: Morphine, Nystatin, Prilosec [omeprazole], Sulfa drugs, Pravastatin, Bio-35 gluten-free [actical], Cobalt, and Lanolin No known allergy to Anesthetic, or Latex.     Current medicines: Current Medications[2]  Anticoagulant: ASA, NSAIDs   Herbals: Black Cohash, Echinacea, Garlic, Leticia, Ginkgo Biloba, Ginseng, Kava, Jim’s Wort,  Saw Palmetto, Valerian             ROS: negative for: CP, SOB, NOBLE, Orthopnea, wheezing, leg edema, polydipsia, polyuria, fevers, chills, sweats, cough, cold, congestion, abd pain, reflux, black or bloody stools, weight loss/gain.  Functional Status: ambulatory, uses assistive device,  "prolonged immobility, lives alone, requires PT/FT care    PHYSICAL EXAMINATION:  VITAL SIGNS: BP 92/60 (BP Location: Left arm, Patient Position: Sitting, BP Cuff Size: Adult)   Pulse 68   Temp 36.2 °C (97.2 °F) (Temporal)   Ht 1.575 m (5' 2\")   Wt 76.8 kg (169 lb 5 oz)   SpO2 94%  Body mass index is 30.97 kg/m².    HEENT: EOMI, PERRL. Oropharynx pink, moist. Mallampati: II (soft palate, uvula, fauces visible). Neck supple, no cervical lymphadenopathy.   LUNGS: CTAB good excursion.   HEART: RRR no murmur.  ABDOMEN: soft, nondistended, nontender normal BS. No HSM.  LOWER EXTREMITIES: warm and well perfused with no edema.      Labs: See attached.    IMPRESSION:  The primary encounter diagnosis was Preoperative clearance. Diagnoses of Primary osteoarthritis of right hip, Prediabetes, Pure hypercholesterolemia, and Elevated coronary artery calcium score were also pertinent to this visit.  Planned surgery:Right Total Hip Arthroplasty   High risk medical conditions: negative for Cardiac, Pulmonary, Bleeding, Poor healing, Thrombosis, Debility    PLAN:  Chronic medical conditions: Stable and controlled. Continue current medicines.   Avoid drugs that potentiate bleeding as advised by surgeon  Discontinue all herbal supplements 2 weeks prior to surgery.  Need for SBE prophylaxis: No  This patient is considered Low risk for cardiopulmonary complications for this planned surgery.    Germán Index for assessing perioperative cardiovascular risk [Circulation 1999;100:1047]:   (one point each for * high-risk surgery [ intrathoracic, suprainguinal vascular, intraperitoneal ],* Hx ischemic heart dz, * Hx CHF, *Hx TIA or CVA, *IDDM, *Serum Cr >2.0)   Interpretation of risk: (Complication = MI, Pulm edema, v-fib or cardiac arrest, complete heart block)  Very Low: 0 points = 0.4 % complication. Low: 1 point = 0.9 %, Moderate: 2 points = 6.6 %, High: 3+ points = 11%.    The MA is performing the above orders under the direction of Dr." Alda    Please note that this dictation was created using voice recognition software. I have made every reasonable attempt to correct obvious errors, but I expect that there are errors of grammar and possibly content that I did not discover before finalizing the note.          [1]   Social History  Tobacco Use    Smoking status: Never    Smokeless tobacco: Never   Vaping Use    Vaping status: Never Used   Substance Use Topics    Alcohol use: Yes     Comment: 1 per month    Drug use: No   [2]   Current Outpatient Medications   Medication Sig Dispense Refill    Ruxolitinib Phosphate (OPZELURA) 1.5 % Cream       ketoconazole (NIZORAL) 2 % Cream APPLY 1 APPLICATION TOPICALLY TWO TIMES A DAY AS NEEDED FOR RASH 30 g 3    atorvastatin (LIPITOR) 10 MG Tab TAKE 1 TABLET EVERY EVENING 90 Tablet 0    celecoxib (CELEBREX) 200 MG Cap Take 200 mg by mouth 2 times a day.      TIRZEPATIDE SC Inject 10 mg under the skin every 7 days. Dosage unknown      Amoxicillin 500 MG Tab TAKE 4 TABLETS ORALLY 1 HOUR PRIOR TO DENTAL CLEANING/PROCEDURE 4 Tablet 6    clindamycin (CLEOCIN) 1 % Solution APPLY 1 TO 2 TIMES DAILY TO AFFECTED AREAS 60 mL 11    amitriptyline (ELAVIL) 10 MG Tab TAKE 1 TABLET BY MOUTH NIGHTLY FOR INTERSTITIAL CYSTITIS 90 Tablet 3    Levocetirizine Dihydrochloride 5 MG Tab Take  by mouth every day.      PROCTOSOL HC 2.5 % Cream APPLY 1 PEASIZED AMOUNT TOPICALLY TO AFFECTED AREA TWICE DAILY FOR TWO WEEKS      triamcinolone acetonide (KENALOG) 0.1 % Cream Apply  to affected area(s) as needed.      fluticasone (FLONASE) 50 MCG/ACT nasal spray USE 2 SPRAYS IN EACH NOSTRIL DAILY AS NEEDED 16 g 3    mupirocin (BACTROBAN) 2 % OINT Apply  to affected area(s) as needed.      Glucosamine-Chondroit-Vit C-Mn (GLUCOSAMINE CHONDR 1500 COMPLX PO) Take  by mouth every day.      Multiple Vitamin (MULTI-VITAMIN DAILY PO) Take  by mouth.       No current facility-administered medications for this visit.

## 2025-06-12 NOTE — Clinical Note
REFERRAL APPROVAL NOTICE         Sent on June 12, 2025                   Shaunna Vieira  [address]  [place] [address]                   Dear Ms. Vieira,    After a careful review of the medical information and benefit coverage, [facility] has processed your referral. See below for additional details.    If applicable, you must be actively enrolled with your insurance for coverage of the authorized service. If you have any questions regarding your coverage, please contact your insurance directly.    REFERRAL INFORMATION   Referral #:  [id number]  Referred-To Provider    Referred-By Provider:  Vascular Surgery    DUY Herrera, WAQAAR      [address] [place] [address] [phone] [address]  [place] [id number] [phone]    Referral Start Date:  06/03/2025  Referral End Date:   06/03/2026             SCHEDULING  If you do not already have an appointment, please call [phone] to make an appointment.     MORE INFORMATION  If you do not already have a [employer] account, sign up at: [email or web address]  You can access your medical information, make appointments, see lab results, billing information, and more.  If you have questions regarding this referral, please contact  the [facility] Referrals department at:             [phone]. Monday - Friday 8:00AM - 5:00PM.     Sincerely,    [facility]

## 2025-06-13 ENCOUNTER — APPOINTMENT (OUTPATIENT)
Dept: ADMISSIONS | Facility: MEDICAL CENTER | Age: 70
End: 2025-06-13
Attending: ORTHOPAEDIC SURGERY
Payer: COMMERCIAL

## 2025-06-15 DIAGNOSIS — E78.00 PURE HYPERCHOLESTEROLEMIA: ICD-10-CM

## 2025-06-16 RX ORDER — ATORVASTATIN CALCIUM 10 MG/1
10 TABLET, FILM COATED ORAL EVERY EVENING
Qty: 90 TABLET | Refills: 3 | Status: SHIPPED | OUTPATIENT
Start: 2025-06-16

## 2025-06-17 ENCOUNTER — PRE-ADMISSION TESTING (OUTPATIENT)
Dept: ADMISSIONS | Facility: MEDICAL CENTER | Age: 70
End: 2025-06-17
Attending: ORTHOPAEDIC SURGERY
Payer: COMMERCIAL

## 2025-06-17 VITALS — BODY MASS INDEX: 29.99 KG/M2 | HEIGHT: 63 IN

## 2025-06-17 DIAGNOSIS — Z01.812 PRE-OPERATIVE LABORATORY EXAMINATION: Primary | ICD-10-CM

## 2025-06-17 RX ORDER — CLINDAMYCIN PHOSPHATE 11.9 MG/ML
SOLUTION TOPICAL 2 TIMES DAILY
COMMUNITY
End: 2025-06-17

## 2025-06-17 RX ORDER — LORATADINE 10 MG/1
10 TABLET ORAL
COMMUNITY

## 2025-06-17 NOTE — PREADMIT AVS NOTE
Current Medications   Medication Instructions    loratadine (CLARITIN) 10 MG Tab Continue taking as prescribed.    atorvastatin (LIPITOR) 10 MG Tab Continue taking as prescribed.    Ruxolitinib Phosphate (OPZELURA) 1.5 % Cream Hold medication day of procedure    Tirzepatide-Weight Management 10 MG/0.5ML Solution vial Stop 7 days before surgery    ketoconazole (NIZORAL) 2 % Cream Hold medication day of procedure    celecoxib (CELEBREX) 200 MG Cap Stop 5 days before surgery         clindamycin (CLEOCIN) 1 % Solution Hold medication day of procedure    amitriptyline (ELAVIL) 10 MG Tab Continue taking as prescribed.    triamcinolone acetonide (KENALOG) 0.1 % Cream Hold medication day of procedure    fluticasone (FLONASE) 50 MCG/ACT nasal spray Continue taking as prescribed.    Glucosamine-Chondroit-Vit C-Mn (GLUCOSAMINE CHONDR 1500 COMPLX PO) Stop 7 days before surgery    Multiple Vitamin (MULTI-VITAMIN DAILY PO) Stop 7 days before surgery

## 2025-06-20 ENCOUNTER — PRE-ADMISSION TESTING (OUTPATIENT)
Dept: ADMISSIONS | Facility: MEDICAL CENTER | Age: 70
End: 2025-06-20
Attending: ORTHOPAEDIC SURGERY
Payer: COMMERCIAL

## 2025-06-20 VITALS — BODY MASS INDEX: 30.12 KG/M2 | WEIGHT: 170 LBS | HEIGHT: 63 IN

## 2025-06-20 DIAGNOSIS — Z01.812 PRE-OPERATIVE LABORATORY EXAMINATION: ICD-10-CM

## 2025-06-20 LAB
ANION GAP SERPL CALC-SCNC: 13 MMOL/L (ref 7–16)
BUN SERPL-MCNC: 18 MG/DL (ref 8–22)
CALCIUM SERPL-MCNC: 9.6 MG/DL (ref 8.5–10.5)
CHLORIDE SERPL-SCNC: 103 MMOL/L (ref 96–112)
CO2 SERPL-SCNC: 23 MMOL/L (ref 20–33)
CREAT SERPL-MCNC: 0.79 MG/DL (ref 0.5–1.4)
ERYTHROCYTE [DISTWIDTH] IN BLOOD BY AUTOMATED COUNT: 41.3 FL (ref 35.9–50)
EST. AVERAGE GLUCOSE BLD GHB EST-MCNC: 105 MG/DL
GFR SERPLBLD CREATININE-BSD FMLA CKD-EPI: 81 ML/MIN/1.73 M 2
GLUCOSE SERPL-MCNC: 93 MG/DL (ref 65–99)
HBA1C MFR BLD: 5.3 % (ref 4–5.6)
HCT VFR BLD AUTO: 42.6 % (ref 37–47)
HGB BLD-MCNC: 14.1 G/DL (ref 12–16)
MCH RBC QN AUTO: 29.3 PG (ref 27–33)
MCHC RBC AUTO-ENTMCNC: 33.1 G/DL (ref 32.2–35.5)
MCV RBC AUTO: 88.4 FL (ref 81.4–97.8)
PLATELET # BLD AUTO: 261 K/UL (ref 164–446)
PMV BLD AUTO: 10 FL (ref 9–12.9)
POTASSIUM SERPL-SCNC: 4.2 MMOL/L (ref 3.6–5.5)
RBC # BLD AUTO: 4.82 M/UL (ref 4.2–5.4)
SODIUM SERPL-SCNC: 139 MMOL/L (ref 135–145)
WBC # BLD AUTO: 4.9 K/UL (ref 4.8–10.8)

## 2025-06-20 PROCEDURE — 83036 HEMOGLOBIN GLYCOSYLATED A1C: CPT

## 2025-06-20 PROCEDURE — 80048 BASIC METABOLIC PNL TOTAL CA: CPT

## 2025-06-20 PROCEDURE — 87641 MR-STAPH DNA AMP PROBE: CPT

## 2025-06-20 PROCEDURE — 87640 STAPH A DNA AMP PROBE: CPT

## 2025-06-20 PROCEDURE — 85027 COMPLETE CBC AUTOMATED: CPT

## 2025-06-20 PROCEDURE — 36415 COLL VENOUS BLD VENIPUNCTURE: CPT

## 2025-06-20 ASSESSMENT — FIBROSIS 4 INDEX: FIB4 SCORE: 0.89

## 2025-06-20 NOTE — DISCHARGE PLANNING
DISCHARGE PLANNING NOTE - TOTAL JOINT    Procedure: RIGHT TOTAL HIP ARTHROPLASTY DIRECT ANTERIOR APPROACH   Procedure Date: 7/9/2025  Insurance: Payor: [facility] / Plan: UMR - PEBP / Product Type: *No Product type* /    Equipment currently available at home?  front-wheel walker and Ice Packs, grab bars, grabber  Steps into the home? 2  Steps within the home? 1 flight with 1 landing  Toilet height? Standard  Type of shower? walk-in shower  Home Oxygen? No  Portable tank?    Oxygen Provider:  Planning same day discharge: No- spending the night    Is Outpatient Physical Therapy set up after surgery? Yes  Did you take the Total Joint Class and where or did you receive an Educational booklet? No- flyer given for class, received NAON book.  Who will be your transportation home on day of discharge? Dre- sibling    Have you made arrangements to have someone stay with you at home for the first 3 days following discharge, and if so, whom? Dre- sibling    Have you notified your surgeon that you do not have transportation or someone to help you after discharge? N/A    Are you planning on going to a transitional care facility, for example a skilled nursing facility, post operatively for rehab, and if so, have you contacted your insurance plan to see if they cover this? No      Met with the pt. Pt given a copy of Home Safety Checklist, Equipment Resource Guide, CHG scrub kit and instructions. Expected process in Recovery Room and dc criteria discussed with pt. All questions answered and verbalizes understanding of all instructions. Pt states she will need to spend the night after surgery d/t oxygen desat. No dc needs identified at this time. Anticipate dc to home without barriers. MRSA swab obtained.

## 2025-06-23 LAB
SCCMEC + MECA PNL NOSE NAA+PROBE: NEGATIVE
SCCMEC + MECA PNL NOSE NAA+PROBE: NEGATIVE

## 2025-07-09 ENCOUNTER — HOSPITAL ENCOUNTER (OUTPATIENT)
Facility: MEDICAL CENTER | Age: 70
End: 2025-07-09
Attending: ORTHOPAEDIC SURGERY | Admitting: ORTHOPAEDIC SURGERY
Payer: COMMERCIAL

## 2025-07-09 ENCOUNTER — APPOINTMENT (OUTPATIENT)
Dept: RADIOLOGY | Facility: MEDICAL CENTER | Age: 70
End: 2025-07-09
Attending: ORTHOPAEDIC SURGERY
Payer: COMMERCIAL

## 2025-07-09 ENCOUNTER — ANESTHESIA EVENT (OUTPATIENT)
Dept: SURGERY | Facility: MEDICAL CENTER | Age: 70
End: 2025-07-09
Payer: COMMERCIAL

## 2025-07-09 ENCOUNTER — ANESTHESIA (OUTPATIENT)
Dept: SURGERY | Facility: MEDICAL CENTER | Age: 70
End: 2025-07-09
Payer: COMMERCIAL

## 2025-07-09 VITALS
DIASTOLIC BLOOD PRESSURE: 46 MMHG | WEIGHT: 170.64 LBS | HEART RATE: 85 BPM | BODY MASS INDEX: 31.4 KG/M2 | TEMPERATURE: 97.9 F | RESPIRATION RATE: 12 BRPM | SYSTOLIC BLOOD PRESSURE: 91 MMHG | HEIGHT: 62 IN | OXYGEN SATURATION: 96 %

## 2025-07-09 DIAGNOSIS — M16.0 BILATERAL HIP JOINT ARTHRITIS: ICD-10-CM

## 2025-07-09 DIAGNOSIS — M16.11 PRIMARY OSTEOARTHRITIS OF RIGHT HIP: Primary | ICD-10-CM

## 2025-07-09 PROBLEM — T88.59XA DELAYED EMERGENCE FROM ANESTHESIA: Status: ACTIVE | Noted: 2025-07-09

## 2025-07-09 PROCEDURE — 97165 OT EVAL LOW COMPLEX 30 MIN: CPT

## 2025-07-09 PROCEDURE — 160048 HCHG OR STATISTICAL LEVEL 1-5: Performed by: ORTHOPAEDIC SURGERY

## 2025-07-09 PROCEDURE — 160046 HCHG PACU - 1ST 60 MINS PHASE II: Performed by: ORTHOPAEDIC SURGERY

## 2025-07-09 PROCEDURE — 160002 HCHG RECOVERY MINUTES (STAT): Performed by: ORTHOPAEDIC SURGERY

## 2025-07-09 PROCEDURE — 160191 HCHG ANESTHESIA STANDARD: Performed by: ORTHOPAEDIC SURGERY

## 2025-07-09 PROCEDURE — C1776 JOINT DEVICE (IMPLANTABLE): HCPCS | Performed by: ORTHOPAEDIC SURGERY

## 2025-07-09 PROCEDURE — 97535 SELF CARE MNGMENT TRAINING: CPT

## 2025-07-09 PROCEDURE — C1713 ANCHOR/SCREW BN/BN,TIS/BN: HCPCS | Performed by: ORTHOPAEDIC SURGERY

## 2025-07-09 PROCEDURE — 160193 HCHG PACU STANDARD - 1ST 60 MINS: Performed by: ORTHOPAEDIC SURGERY

## 2025-07-09 PROCEDURE — 700101 HCHG RX REV CODE 250: Performed by: ANESTHESIOLOGY

## 2025-07-09 PROCEDURE — 160025 RECOVERY II MINUTES (STATS): Performed by: ORTHOPAEDIC SURGERY

## 2025-07-09 PROCEDURE — 97162 PT EVAL MOD COMPLEX 30 MIN: CPT

## 2025-07-09 PROCEDURE — 72170 X-RAY EXAM OF PELVIS: CPT

## 2025-07-09 PROCEDURE — 770030 HCHG ROOM/CARE - EXTENDED RECOVERY EACH 15 MIN

## 2025-07-09 PROCEDURE — 700105 HCHG RX REV CODE 258: Performed by: ANESTHESIOLOGY

## 2025-07-09 PROCEDURE — 160015 HCHG STAT PREOP MINUTES: Performed by: ORTHOPAEDIC SURGERY

## 2025-07-09 PROCEDURE — 160047 HCHG PACU  - EA ADDL 30 MINS PHASE II: Performed by: ORTHOPAEDIC SURGERY

## 2025-07-09 PROCEDURE — 700101 HCHG RX REV CODE 250

## 2025-07-09 PROCEDURE — 700102 HCHG RX REV CODE 250 W/ 637 OVERRIDE(OP): Performed by: ANESTHESIOLOGY

## 2025-07-09 PROCEDURE — 73501 X-RAY EXAM HIP UNI 1 VIEW: CPT | Mod: RT

## 2025-07-09 PROCEDURE — 160031 HCHG SURGERY MINUTES - 1ST 30 MINS LEVEL 5: Performed by: ORTHOPAEDIC SURGERY

## 2025-07-09 PROCEDURE — 700111 HCHG RX REV CODE 636 W/ 250 OVERRIDE (IP): Performed by: ORTHOPAEDIC SURGERY

## 2025-07-09 PROCEDURE — A9270 NON-COVERED ITEM OR SERVICE: HCPCS | Performed by: ANESTHESIOLOGY

## 2025-07-09 PROCEDURE — 700111 HCHG RX REV CODE 636 W/ 250 OVERRIDE (IP): Performed by: ANESTHESIOLOGY

## 2025-07-09 PROCEDURE — 160042 HCHG SURGERY MINUTES - EA ADDL 1 MIN LEVEL 5: Performed by: ORTHOPAEDIC SURGERY

## 2025-07-09 DEVICE — IMPLANTABLE DEVICE: Type: IMPLANTABLE DEVICE | Site: HIP | Status: FUNCTIONAL

## 2025-07-09 DEVICE — LINER 42MM MDM CEMENTLESS (1EA): Type: IMPLANTABLE DEVICE | Site: HIP | Status: FUNCTIONAL

## 2025-07-09 DEVICE — INSERT ACETABULAR 28MM 48MM RESTORATION (1EA): Type: IMPLANTABLE DEVICE | Site: HIP | Status: FUNCTIONAL

## 2025-07-09 DEVICE — HEAD FEMORAL V40 CERAMIC BIOLOX DELTA 28MM +0MM OFFSET (1EA): Type: IMPLANTABLE DEVICE | Site: HIP | Status: FUNCTIONAL

## 2025-07-09 DEVICE — SCREW BONE 6.5 X 30MM TRIDENT LP HEX (1EA): Type: IMPLANTABLE DEVICE | Site: HIP | Status: FUNCTIONAL

## 2025-07-09 DEVICE — SHELL ACETUBULAR TRIDENT II TRITANIUM CLUSTER HOLE 52MM (1EA): Type: IMPLANTABLE DEVICE | Site: HIP | Status: FUNCTIONAL

## 2025-07-09 DEVICE — SCREW BONE 6.5 X 25MM TRIDENT LP HEX (1EA): Type: IMPLANTABLE DEVICE | Site: HIP | Status: FUNCTIONAL

## 2025-07-09 RX ORDER — ONDANSETRON 2 MG/ML
4 INJECTION INTRAMUSCULAR; INTRAVENOUS EVERY 4 HOURS PRN
Status: CANCELLED | OUTPATIENT
Start: 2025-07-09

## 2025-07-09 RX ORDER — ALBUTEROL SULFATE 5 MG/ML
2.5 SOLUTION RESPIRATORY (INHALATION)
Status: DISCONTINUED | OUTPATIENT
Start: 2025-07-09 | End: 2025-07-09 | Stop reason: HOSPADM

## 2025-07-09 RX ORDER — CEFAZOLIN SODIUM 1 G/3ML
INJECTION, POWDER, FOR SOLUTION INTRAMUSCULAR; INTRAVENOUS PRN
Status: DISCONTINUED | OUTPATIENT
Start: 2025-07-09 | End: 2025-07-09 | Stop reason: SURG

## 2025-07-09 RX ORDER — DOCUSATE SODIUM 100 MG/1
100 CAPSULE, LIQUID FILLED ORAL 2 TIMES DAILY
Status: CANCELLED | OUTPATIENT
Start: 2025-07-09

## 2025-07-09 RX ORDER — HALOPERIDOL 5 MG/ML
1 INJECTION INTRAMUSCULAR
Status: DISCONTINUED | OUTPATIENT
Start: 2025-07-09 | End: 2025-07-09 | Stop reason: HOSPADM

## 2025-07-09 RX ORDER — KETOROLAC TROMETHAMINE 15 MG/ML
INJECTION, SOLUTION INTRAMUSCULAR; INTRAVENOUS PRN
Status: DISCONTINUED | OUTPATIENT
Start: 2025-07-09 | End: 2025-07-09 | Stop reason: SURG

## 2025-07-09 RX ORDER — BISACODYL 10 MG
10 SUPPOSITORY, RECTAL RECTAL
Status: CANCELLED | OUTPATIENT
Start: 2025-07-09

## 2025-07-09 RX ORDER — ACETAMINOPHEN 500 MG
1000 TABLET ORAL EVERY 6 HOURS
Status: CANCELLED | OUTPATIENT
Start: 2025-07-09 | End: 2025-07-14

## 2025-07-09 RX ORDER — MEPERIDINE HYDROCHLORIDE 25 MG/ML
12.5 INJECTION INTRAMUSCULAR; INTRAVENOUS; SUBCUTANEOUS
Status: DISCONTINUED | OUTPATIENT
Start: 2025-07-09 | End: 2025-07-09 | Stop reason: HOSPADM

## 2025-07-09 RX ORDER — POLYETHYLENE GLYCOL 3350 17 G/17G
1 POWDER, FOR SOLUTION ORAL 2 TIMES DAILY PRN
Status: CANCELLED | OUTPATIENT
Start: 2025-07-09

## 2025-07-09 RX ORDER — MIDAZOLAM HYDROCHLORIDE 1 MG/ML
1 INJECTION INTRAMUSCULAR; INTRAVENOUS
Status: DISCONTINUED | OUTPATIENT
Start: 2025-07-09 | End: 2025-07-09 | Stop reason: HOSPADM

## 2025-07-09 RX ORDER — OXYCODONE HYDROCHLORIDE 5 MG/1
5 TABLET ORAL
Refills: 0 | Status: CANCELLED | OUTPATIENT
Start: 2025-07-09

## 2025-07-09 RX ORDER — HYDROMORPHONE HYDROCHLORIDE 1 MG/ML
0.4 INJECTION, SOLUTION INTRAMUSCULAR; INTRAVENOUS; SUBCUTANEOUS
Status: DISCONTINUED | OUTPATIENT
Start: 2025-07-09 | End: 2025-07-09 | Stop reason: HOSPADM

## 2025-07-09 RX ORDER — DEXAMETHASONE SODIUM PHOSPHATE 4 MG/ML
INJECTION, SOLUTION INTRA-ARTICULAR; INTRALESIONAL; INTRAMUSCULAR; INTRAVENOUS; SOFT TISSUE PRN
Status: DISCONTINUED | OUTPATIENT
Start: 2025-07-09 | End: 2025-07-09 | Stop reason: SURG

## 2025-07-09 RX ORDER — DIPHENHYDRAMINE HYDROCHLORIDE 50 MG/ML
12.5 INJECTION, SOLUTION INTRAMUSCULAR; INTRAVENOUS
Status: DISCONTINUED | OUTPATIENT
Start: 2025-07-09 | End: 2025-07-09 | Stop reason: HOSPADM

## 2025-07-09 RX ORDER — LABETALOL HYDROCHLORIDE 5 MG/ML
5 INJECTION, SOLUTION INTRAVENOUS
Status: DISCONTINUED | OUTPATIENT
Start: 2025-07-09 | End: 2025-07-09 | Stop reason: HOSPADM

## 2025-07-09 RX ORDER — SODIUM CHLORIDE, SODIUM LACTATE, POTASSIUM CHLORIDE, CALCIUM CHLORIDE 600; 310; 30; 20 MG/100ML; MG/100ML; MG/100ML; MG/100ML
INJECTION, SOLUTION INTRAVENOUS CONTINUOUS
Status: DISCONTINUED | OUTPATIENT
Start: 2025-07-09 | End: 2025-07-09 | Stop reason: HOSPADM

## 2025-07-09 RX ORDER — HYDROMORPHONE HYDROCHLORIDE 1 MG/ML
0.5 INJECTION, SOLUTION INTRAMUSCULAR; INTRAVENOUS; SUBCUTANEOUS
Status: CANCELLED | OUTPATIENT
Start: 2025-07-09

## 2025-07-09 RX ORDER — ASPIRIN 81 MG/1
81 TABLET ORAL 2 TIMES DAILY
Status: CANCELLED | OUTPATIENT
Start: 2025-07-09

## 2025-07-09 RX ORDER — KETOROLAC TROMETHAMINE 15 MG/ML
INJECTION, SOLUTION INTRAMUSCULAR; INTRAVENOUS
Status: DISCONTINUED | OUTPATIENT
Start: 2025-07-09 | End: 2025-07-09 | Stop reason: HOSPADM

## 2025-07-09 RX ORDER — OXYCODONE HYDROCHLORIDE 10 MG/1
10 TABLET ORAL
Refills: 0 | Status: CANCELLED | OUTPATIENT
Start: 2025-07-09

## 2025-07-09 RX ORDER — ROPIVACAINE HYDROCHLORIDE 5 MG/ML
INJECTION, SOLUTION EPIDURAL; INFILTRATION; PERINEURAL
Status: DISCONTINUED | OUTPATIENT
Start: 2025-07-09 | End: 2025-07-09 | Stop reason: HOSPADM

## 2025-07-09 RX ORDER — DEXAMETHASONE SODIUM PHOSPHATE 4 MG/ML
4 INJECTION, SOLUTION INTRA-ARTICULAR; INTRALESIONAL; INTRAMUSCULAR; INTRAVENOUS; SOFT TISSUE
Status: CANCELLED | OUTPATIENT
Start: 2025-07-09

## 2025-07-09 RX ORDER — TRANEXAMIC ACID 100 MG/ML
INJECTION, SOLUTION INTRAVENOUS PRN
Status: DISCONTINUED | OUTPATIENT
Start: 2025-07-09 | End: 2025-07-09 | Stop reason: SURG

## 2025-07-09 RX ORDER — ONDANSETRON 2 MG/ML
INJECTION INTRAMUSCULAR; INTRAVENOUS PRN
Status: DISCONTINUED | OUTPATIENT
Start: 2025-07-09 | End: 2025-07-09 | Stop reason: SURG

## 2025-07-09 RX ORDER — ACETAMINOPHEN 500 MG
1000 TABLET ORAL ONCE
Status: DISCONTINUED | OUTPATIENT
Start: 2025-07-09 | End: 2025-07-09 | Stop reason: HOSPADM

## 2025-07-09 RX ORDER — HYDROMORPHONE HYDROCHLORIDE 1 MG/ML
0.2 INJECTION, SOLUTION INTRAMUSCULAR; INTRAVENOUS; SUBCUTANEOUS
Status: DISCONTINUED | OUTPATIENT
Start: 2025-07-09 | End: 2025-07-09 | Stop reason: HOSPADM

## 2025-07-09 RX ORDER — AMOXICILLIN 250 MG
1 CAPSULE ORAL NIGHTLY
Status: CANCELLED | OUTPATIENT
Start: 2025-07-09

## 2025-07-09 RX ORDER — IBUPROFEN 400 MG/1
800 TABLET, FILM COATED ORAL 3 TIMES DAILY PRN
Status: CANCELLED | OUTPATIENT
Start: 2025-07-12

## 2025-07-09 RX ORDER — EPHEDRINE SULFATE 50 MG/ML
5 INJECTION, SOLUTION INTRAVENOUS
Status: DISCONTINUED | OUTPATIENT
Start: 2025-07-09 | End: 2025-07-09 | Stop reason: HOSPADM

## 2025-07-09 RX ORDER — VANCOMYCIN HYDROCHLORIDE 1 G/20ML
INJECTION, POWDER, LYOPHILIZED, FOR SOLUTION INTRAVENOUS
Status: COMPLETED | OUTPATIENT
Start: 2025-07-09 | End: 2025-07-09

## 2025-07-09 RX ORDER — ROCURONIUM BROMIDE 10 MG/ML
INJECTION, SOLUTION INTRAVENOUS PRN
Status: DISCONTINUED | OUTPATIENT
Start: 2025-07-09 | End: 2025-07-09 | Stop reason: SURG

## 2025-07-09 RX ORDER — EPHEDRINE SULFATE 50 MG/ML
INJECTION, SOLUTION INTRAVENOUS PRN
Status: DISCONTINUED | OUTPATIENT
Start: 2025-07-09 | End: 2025-07-09 | Stop reason: SURG

## 2025-07-09 RX ORDER — KETOROLAC TROMETHAMINE 15 MG/ML
15 INJECTION, SOLUTION INTRAMUSCULAR; INTRAVENOUS EVERY 6 HOURS
Status: CANCELLED | OUTPATIENT
Start: 2025-07-09 | End: 2025-07-12

## 2025-07-09 RX ORDER — HYDROMORPHONE HYDROCHLORIDE 1 MG/ML
0.1 INJECTION, SOLUTION INTRAMUSCULAR; INTRAVENOUS; SUBCUTANEOUS
Status: DISCONTINUED | OUTPATIENT
Start: 2025-07-09 | End: 2025-07-09 | Stop reason: HOSPADM

## 2025-07-09 RX ORDER — ONDANSETRON 2 MG/ML
4 INJECTION INTRAMUSCULAR; INTRAVENOUS
Status: DISCONTINUED | OUTPATIENT
Start: 2025-07-09 | End: 2025-07-09 | Stop reason: HOSPADM

## 2025-07-09 RX ORDER — CELECOXIB 200 MG/1
200 CAPSULE ORAL ONCE
Status: DISCONTINUED | OUTPATIENT
Start: 2025-07-09 | End: 2025-07-09 | Stop reason: HOSPADM

## 2025-07-09 RX ORDER — SODIUM CHLORIDE 0.9 % (FLUSH) 0.9 %
SYRINGE (ML) INJECTION
Status: DISCONTINUED | OUTPATIENT
Start: 2025-07-09 | End: 2025-07-09 | Stop reason: HOSPADM

## 2025-07-09 RX ORDER — ACETAMINOPHEN 500 MG
1000 TABLET ORAL ONCE
Status: COMPLETED | OUTPATIENT
Start: 2025-07-09 | End: 2025-07-09

## 2025-07-09 RX ORDER — DIPHENHYDRAMINE HYDROCHLORIDE 50 MG/ML
25 INJECTION, SOLUTION INTRAMUSCULAR; INTRAVENOUS EVERY 6 HOURS PRN
Status: CANCELLED | OUTPATIENT
Start: 2025-07-09

## 2025-07-09 RX ORDER — METOPROLOL TARTRATE 1 MG/ML
1 INJECTION, SOLUTION INTRAVENOUS
Status: DISCONTINUED | OUTPATIENT
Start: 2025-07-09 | End: 2025-07-09 | Stop reason: HOSPADM

## 2025-07-09 RX ORDER — SCOPOLAMINE 1 MG/3D
1 PATCH, EXTENDED RELEASE TRANSDERMAL
Status: CANCELLED | OUTPATIENT
Start: 2025-07-09

## 2025-07-09 RX ORDER — PHENYLEPHRINE HCL IN 0.9% NACL 1 MG/10 ML
SYRINGE (ML) INTRAVENOUS PRN
Status: DISCONTINUED | OUTPATIENT
Start: 2025-07-09 | End: 2025-07-09 | Stop reason: SURG

## 2025-07-09 RX ORDER — OXYCODONE HCL 5 MG/5 ML
5 SOLUTION, ORAL ORAL
Status: DISCONTINUED | OUTPATIENT
Start: 2025-07-09 | End: 2025-07-09 | Stop reason: HOSPADM

## 2025-07-09 RX ORDER — OXYCODONE HCL 5 MG/5 ML
10 SOLUTION, ORAL ORAL
Status: DISCONTINUED | OUTPATIENT
Start: 2025-07-09 | End: 2025-07-09 | Stop reason: HOSPADM

## 2025-07-09 RX ORDER — LIDOCAINE HYDROCHLORIDE 20 MG/ML
INJECTION, SOLUTION EPIDURAL; INFILTRATION; INTRACAUDAL; PERINEURAL PRN
Status: DISCONTINUED | OUTPATIENT
Start: 2025-07-09 | End: 2025-07-09 | Stop reason: SURG

## 2025-07-09 RX ORDER — HYDRALAZINE HYDROCHLORIDE 20 MG/ML
5 INJECTION INTRAMUSCULAR; INTRAVENOUS
Status: DISCONTINUED | OUTPATIENT
Start: 2025-07-09 | End: 2025-07-09 | Stop reason: HOSPADM

## 2025-07-09 RX ORDER — OXYCODONE HCL 10 MG/1
10 TABLET, FILM COATED, EXTENDED RELEASE ORAL ONCE
Refills: 0 | Status: DISCONTINUED | OUTPATIENT
Start: 2025-07-09 | End: 2025-07-09 | Stop reason: HOSPADM

## 2025-07-09 RX ORDER — ACETAMINOPHEN 500 MG
1000 TABLET ORAL EVERY 6 HOURS PRN
Status: CANCELLED | OUTPATIENT
Start: 2025-07-14

## 2025-07-09 RX ORDER — SODIUM PHOSPHATE,MONO-DIBASIC 19G-7G/118
1 ENEMA (ML) RECTAL
Status: CANCELLED | OUTPATIENT
Start: 2025-07-09

## 2025-07-09 RX ORDER — CEFAZOLIN SODIUM 1 G/3ML
2 INJECTION, POWDER, FOR SOLUTION INTRAMUSCULAR; INTRAVENOUS ONCE
Status: DISCONTINUED | OUTPATIENT
Start: 2025-07-09 | End: 2025-07-09 | Stop reason: HOSPADM

## 2025-07-09 RX ORDER — HALOPERIDOL 5 MG/ML
1 INJECTION INTRAMUSCULAR EVERY 6 HOURS PRN
Status: CANCELLED | OUTPATIENT
Start: 2025-07-09

## 2025-07-09 RX ORDER — SODIUM CHLORIDE, SODIUM LACTATE, POTASSIUM CHLORIDE, CALCIUM CHLORIDE 600; 310; 30; 20 MG/100ML; MG/100ML; MG/100ML; MG/100ML
INJECTION, SOLUTION INTRAVENOUS
Status: DISCONTINUED | OUTPATIENT
Start: 2025-07-09 | End: 2025-07-09 | Stop reason: SURG

## 2025-07-09 RX ORDER — AMOXICILLIN 250 MG
1 CAPSULE ORAL
Status: CANCELLED | OUTPATIENT
Start: 2025-07-09

## 2025-07-09 RX ADMIN — ROCURONIUM BROMIDE 20 MG: 10 INJECTION INTRAVENOUS at 07:51

## 2025-07-09 RX ADMIN — LIDOCAINE HYDROCHLORIDE 100 MG: 20 INJECTION, SOLUTION EPIDURAL; INFILTRATION; INTRACAUDAL; PERINEURAL at 07:10

## 2025-07-09 RX ADMIN — ACETAMINOPHEN 1000 MG: 500 TABLET ORAL at 09:43

## 2025-07-09 RX ADMIN — PROPOFOL 140 MG: 10 INJECTION, EMULSION INTRAVENOUS at 07:10

## 2025-07-09 RX ADMIN — Medication 100 MCG: at 07:41

## 2025-07-09 RX ADMIN — FENTANYL CITRATE 50 MCG: 50 INJECTION, SOLUTION INTRAMUSCULAR; INTRAVENOUS at 07:15

## 2025-07-09 RX ADMIN — Medication 100 MCG: at 07:47

## 2025-07-09 RX ADMIN — DEXAMETHASONE SODIUM PHOSPHATE 4 MG: 4 INJECTION INTRA-ARTICULAR; INTRALESIONAL; INTRAMUSCULAR; INTRAVENOUS; SOFT TISSUE at 07:10

## 2025-07-09 RX ADMIN — CEFAZOLIN 2 G: 1 INJECTION, POWDER, FOR SOLUTION INTRAMUSCULAR; INTRAVENOUS at 07:10

## 2025-07-09 RX ADMIN — SODIUM CHLORIDE, POTASSIUM CHLORIDE, SODIUM LACTATE AND CALCIUM CHLORIDE: 600; 310; 30; 20 INJECTION, SOLUTION INTRAVENOUS at 08:55

## 2025-07-09 RX ADMIN — ONDANSETRON 4 MG: 2 INJECTION INTRAMUSCULAR; INTRAVENOUS at 08:11

## 2025-07-09 RX ADMIN — ROCURONIUM BROMIDE 50 MG: 10 INJECTION INTRAVENOUS at 07:10

## 2025-07-09 RX ADMIN — Medication 100 MCG: at 07:58

## 2025-07-09 RX ADMIN — Medication 200 MCG: at 08:10

## 2025-07-09 RX ADMIN — SUGAMMADEX 200 MG: 100 INJECTION, SOLUTION INTRAVENOUS at 08:18

## 2025-07-09 RX ADMIN — PROPOFOL 150 MCG/KG/MIN: 10 INJECTION, EMULSION INTRAVENOUS at 07:11

## 2025-07-09 RX ADMIN — PROPOFOL 50 MG: 10 INJECTION, EMULSION INTRAVENOUS at 08:20

## 2025-07-09 RX ADMIN — KETOROLAC TROMETHAMINE 15 MG: 15 INJECTION, SOLUTION INTRAMUSCULAR; INTRAVENOUS at 08:11

## 2025-07-09 RX ADMIN — TRANEXAMIC ACID 1000 MG: 100 INJECTION, SOLUTION INTRAVENOUS at 07:15

## 2025-07-09 RX ADMIN — Medication 200 MCG: at 08:18

## 2025-07-09 RX ADMIN — SODIUM CHLORIDE, POTASSIUM CHLORIDE, SODIUM LACTATE AND CALCIUM CHLORIDE: 600; 310; 30; 20 INJECTION, SOLUTION INTRAVENOUS at 07:05

## 2025-07-09 RX ADMIN — FENTANYL CITRATE 50 MCG: 50 INJECTION, SOLUTION INTRAMUSCULAR; INTRAVENOUS at 08:22

## 2025-07-09 RX ADMIN — EPHEDRINE SULFATE 5 MG: 50 INJECTION, SOLUTION INTRAVENOUS at 07:50

## 2025-07-09 ASSESSMENT — COGNITIVE AND FUNCTIONAL STATUS - GENERAL
DRESSING REGULAR LOWER BODY CLOTHING: A LITTLE
TOILETING: A LITTLE
MOVING TO AND FROM BED TO CHAIR: A LITTLE
CLIMB 3 TO 5 STEPS WITH RAILING: A LITTLE
TURNING FROM BACK TO SIDE WHILE IN FLAT BAD: A LITTLE
HELP NEEDED FOR BATHING: A LITTLE
MOVING FROM LYING ON BACK TO SITTING ON SIDE OF FLAT BED: A LITTLE
MOBILITY SCORE: 18
SUGGESTED CMS G CODE MODIFIER MOBILITY: CK
WALKING IN HOSPITAL ROOM: A LITTLE
SUGGESTED CMS G CODE MODIFIER DAILY ACTIVITY: CJ
DAILY ACTIVITIY SCORE: 21
STANDING UP FROM CHAIR USING ARMS: A LITTLE

## 2025-07-09 ASSESSMENT — GAIT ASSESSMENTS
DISTANCE (FEET): 75
DEVIATION: DECREASED TOE OFF;DECREASED HEEL STRIKE
DISTANCE (FEET): 25
GAIT LEVEL OF ASSIST: STANDBY ASSIST
ASSISTIVE DEVICE: FRONT WHEEL WALKER

## 2025-07-09 ASSESSMENT — PAIN DESCRIPTION - PAIN TYPE
TYPE: SURGICAL PAIN;ACUTE PAIN
TYPE: ACUTE PAIN;SURGICAL PAIN
TYPE: SURGICAL PAIN
TYPE: ACUTE PAIN;SURGICAL PAIN
TYPE: SURGICAL PAIN;ACUTE PAIN
TYPE: ACUTE PAIN;SURGICAL PAIN
TYPE: ACUTE PAIN

## 2025-07-09 ASSESSMENT — FIBROSIS 4 INDEX: FIB4 SCORE: 0.85

## 2025-07-09 ASSESSMENT — ACTIVITIES OF DAILY LIVING (ADL): TOILETING: INDEPENDENT

## 2025-07-09 ASSESSMENT — PAIN SCALES - GENERAL: PAIN_LEVEL: 5

## 2025-07-09 NOTE — OR NURSING
1000: Report from Edison LINCOLN.Pt resting, states pain is tolerable, denies nausea. Plan to sit EOB and attempting to stand with RN and CNA.  1015: Report to Edison LINCOLN.

## 2025-07-09 NOTE — OR NURSING
0832 Pt arrived to PACU from OR via gurney. Report received from anesthesia and RN. Respirations are spontaneous and unlabored. VSS on 6L. Dressing is CDI. Cold pack applied. Pt c/o 2/10 right hip pain. See MAR, PO analgesia given.     0846  Family updated.    0848 Xray at bedside    1000 Report to SALAZAR Serrato. Pt meets criteria for transfer to stage 2    1015 Resume pt care    1025 Pt ambulating to stage 2, report to SALAZAR Jc

## 2025-07-09 NOTE — ANESTHESIA TIME REPORT
Anesthesia Start and Stop Event Times       Date Time Event    7/9/2025 0655 Ready for Procedure     0705 Anesthesia Start     0836 Anesthesia Stop          Responsible Staff  07/09/25      Name Role Begin End    Chuck Ivy M.D. Anesth 0705 0836          Overtime Reason:  no overtime (within assigned shift)    Comments:

## 2025-07-09 NOTE — ANESTHESIA PROCEDURE NOTES
Airway    Date/Time: 7/9/2025 7:12 AM    Performed by: Chuck Ivy M.D.  Authorized by: Chuck Ivy M.D.    Location:  OR  Urgency:  Elective  Indications for Airway Management:  Anesthesia      Spontaneous Ventilation: absent    Sedation Level:  Deep  Preoxygenated: Yes    Patient Position:  Sniffing  Mask Difficulty Assessment:  1 - vent by mask  Final Airway Type:  Endotracheal airway  Final Endotracheal Airway:  ETT  Cuffed: Yes    Technique Used for Successful ETT Placement:  Direct laryngoscopy    Insertion Site:  Oral  Blade Type:  Kalen  Laryngoscope Blade/Videolaryngoscope Blade Size:  3  ETT Size (mm):  7.0  Measured from:  Teeth  ETT to Teeth (cm):  22  Placement Verified by: auscultation and capnometry    Cormack-Lehane Classification:  Grade IIa - partial view of glottis  Number of Attempts at Approach:  1

## 2025-07-09 NOTE — ANESTHESIA PREPROCEDURE EVALUATION
Case: [id number] Date/Time: 07/09/25 0645    Procedure: RIGHT TOTAL HIP ARTHROPLASTY DIRECT ANTERIOR APPROACH    Pre-op diagnosis: ARTHRITIS OF RIGHT HIP - RIGHT    Location:  OR  / SURGERY [facility]    Surgeons: Benjamin Mallory M.D.            Relevant Problems   ANESTHESIA   (positive) Delayed emergence from anesthesia      CARDIAC   (positive) Elevated coronary artery calcium score   (positive) Spider veins   (positive) Venous insufficiency      GI   (positive) Gastroesophageal reflux disease      Other   (positive) Bilateral hip joint arthritis   (positive) Osteoarthritis of left hip   (positive) Osteoarthritis of left knee   (positive) Osteoarthritis of left knee, unspecified osteoarthritis type   (positive) Osteoarthrosis   (positive) Primary osteoarthritis of right hip       Physical Exam    Airway   Mallampati: II  TM distance: >3 FB  Neck ROM: full       Cardiovascular - normal exam  Rhythm: regular  Rate: normal    (-) murmur     Dental - normal exam           Pulmonary - normal examBreath sounds clear to auscultation     Abdominal    Neurological - normal exam                   Anesthesia Plan    ASA 2       Plan - general       Airway plan will be ETT          Induction: intravenous    Postoperative Plan: Postoperative administration of opioids is intended.    Pertinent diagnostic labs and testing reviewed    Informed Consent:    Anesthetic plan and risks discussed with patient.    Use of blood products discussed with: patient whom consented to blood products.

## 2025-07-09 NOTE — DISCHARGE INSTRUCTIONS
If any questions arise, call your provider.  If your provider is not available, please feel free to call the Surgical Center at [phone].    MEDICATIONS: Resume taking daily medication.  Take prescribed pain medication with food.  If no medication is prescribed, you may take non-aspirin pain medication if needed.  PAIN MEDICATION CAN BE VERY CONSTIPATING.  Take a stool softener or laxative such as senokot, pericolace, or milk of magnesia if needed.    Last pain medication given at 0940 1000 tylenol    What to Expect Post Anesthesia    Rest and take it easy for the first 24 hours.  A responsible adult is recommended to remain with you during that time.  It is normal to feel sleepy.  We encourage you to not do anything that requires balance, judgment or coordination.    FOR 24 HOURS DO NOT:  Drive, operate machinery or run household appliances.  Drink beer or alcoholic beverages.  Make important decisions or sign legal documents.    To avoid nausea, slowly advance diet as tolerated, avoiding spicy or greasy foods for the first day.  Add more substantial food to your diet according to your provider's instructions.  Babies can be fed formula or breast milk as soon as they are hungry.  INCREASE FLUIDS AND FIBER TO AVOID CONSTIPATION.    MILD FLU-LIKE SYMPTOMS ARE NORMAL.  YOU MAY EXPERIENCE GENERALIZED MUSCLE ACHES, THROAT IRRITATION, HEADACHE AND/OR SOME NAUSEA.    Diet    Resume pre-operative diet upon discharge from the hospital. Depending on how you are feeling and whether you have nausea or not, you may wish to stay with a bland diet for the first few days. However, you can advance your diet as quickly as you feel ready.      Total Hip Replacement Discharge Instructions    ACTIVITY  The first week after your hip replacement is a time to recover from the surgery. We expect light exercise to keep you active and mobile.  Anterior Hip Precautions    To care for your new hip and keep it from sliding out of  position, you'll need to follow a few general rules at first.     Avoid extremes of motion.  Do not step backwards with surgical leg. No hip extension(extending your leg behind you).  Do not allow surgical leg to externally rotate (turn outwards).  Do not cross your legs. Use a pillow between legs when rolling.  Sleep on your surgical side when side lying.     ASSISTED DEVICES: You are being discharged with the following special equipment:  Walker    DRESSING AND WOUND CARE   Keep dressing clean and dry. Leave dressing in place until follow up.     SHOWER / BATHING   OK to shower, keep dressing in place. Pat dressing dry, do not rub incision.  Swimming pools, hot tubs, or baths are to be avoided until after your follow up and then only if cleared by your surgeon.      APPLY ICE PACK TO HIP   Apply ice packs to your hip (15 minutes on the hip, 15 minutes off the hip) for the first week, as you feel necessary to help with the pain and swelling.    SWELLING/BRUISING  Swelling is an expected response to surgery. To reduce swelling, elevate your surgical limb above heart level multiple times per day and apply ice (see Ice instructions). If your swelling becomes excessive, is limiting your ability to move, or becomes worrisome please contact your doctor's office.    Bruising often does not appear until after you arrive home and can be quite dramatic-appearing purple, black, or green. Bruising is typically not concerning and will subside without any treatment.     FOLLOW-UP  Make sure that you have an appointment 7-14 days following surgery.Your procedure/rehabilitation will be discussed and physical therapy may be prescribed at that time.

## 2025-07-09 NOTE — THERAPY
Physical Therapy   Initial Evaluation     Patient Name:  Shaunna Vieira  Age:  69 y.o., Sex:  female  Medical Record #:  [id number]  Today's Date: 7/9/2025     Precautions  Surgical: Hip- Anterior  Weight Bearing: Weight Bearing as Tolerated Right Lower Extremity    Assessment  Patient is 69 y.o. female s/p right MJ POD#0.  Pts pain is well controlled and is able to ambulate safely with FWW, stair training completed. MJ anterior hip precautions reviewed, handout issued.  Pt was provided with education on elevation, icing, positioning, and supine/seated therapeutic exercises. MJ HEP handout issued, pt able to return demo all exercises. Pt has support at home and has no further acute skilled PT needs at this time. Anticipate pt to d/c home once medically clear with recommendation to use FWW and follow-up with OP therapy services.   Plan    Physical Therapy Initial Treatment Plan   Duration: Evaluation only    DC Equipment Recommendations: None  Discharge Recommendations: Recommend outpatient physical therapy services to address higher level deficits        07/09/25 1202   Prior Living Situation   Housing / Facility 1 Story House   Steps Into Home 2   Steps In Home 20   Rail Left Rail (Steps in Home)   Equipment Owned Front-Wheel Walker;Reacher   Lives with - Patient's Self Care Capacity Sibling   Comments Pts brother will be available to assist as needed   Prior Level of Functional Mobility   Bed Mobility Independent   Transfer Status Independent   Ambulation Independent   Assistive Devices Used None   Stairs Independent   Cognition    Cognition / Consciousness WDL   Comments Pt is oriented x4, hyperverbose and talkative, required frequent re-direction   Strength Upper Body   Upper Body Strength  WDL   Active ROM Lower Body    Active ROM Lower Body  X   Comments R hip limited by pain   Strength Lower Body   Lower Body Strength  X   Comments R hip limited by pain   Sensation Lower Body   Lower Extremity  Sensation   WDL   Balance Assessment   Sitting Balance (Static) Good   Sitting Balance (Dynamic) Fair +   Standing Balance (Static) Fair +   Standing Balance (Dynamic) Fair   Weight Shift Sitting Good   Weight Shift Standing Fair   Comments stdg with FWW   Bed Mobility    Comments NT, pt sitting up in chair pre and post   Gait Analysis   Gait Level Of Assist Standby Assist   Assistive Device Front Wheel Walker   Distance (Feet) 75   # of Times Distance was Traveled 1   Deviation Decreased Toe Off;Decreased Heel Strike   # of Stairs Climbed 1  (x2 reps)   Level of Assist with Stairs Contact Guard Assist   Weight Bearing Status WBAT R LE   Functional Mobility   Sit to Stand Standby Assist   Bed, Chair, Wheelchair Transfer Standby Assist

## 2025-07-09 NOTE — THERAPY
Occupational Therapy   Initial Evaluation     Patient Name:  Shaunna Vieira  Age:  69 y.o., Sex:  female  Medical Record #:  [id number]  Today's Date:  7/9/2025     Precautions  Surgical: (P) Hip- Anterior  Weight Bearing: (P) Weight Bearing as Tolerated Right Lower Extremity    Assessment  Patient is 69 y.o. female with a diagnosis of Right MJ (Anterior).  Additional factors influencing patient status / progress: Anterior hip precautions, WBAT RLE.      Pt and brother reside in a 2 story home in [place], [place].  Brother is avilable to assist as needed.  PLOF Mod I to Indep for ADL's, transfers and ambulation w/out a device.    Pt demonstrated SBA sit/stand, Sup FWW, SBA transfers, Sup toileting, Min assist LBCM, Indep UBCM.  Therapist reviewed environmental/safety awareness, fall precautions, joint protection, Anterior hip precautions, AE/DME, ADL's and transfers.    Plan    Occupational Therapy Initial Treatment Plan   Duration: (P) Evaluation only       Discharge Recommendations: (P) Anticipate that the patient will have no further occupational therapy needs after discharge from the hospital     Subjective    Pt was alert and cooperative w/ tx.     Objective       07/09/25 1302    Services   Is patient using  services for this encounter? No   Initial Contact Note    Initial Contact Note Order Received and Verified, Evaluation Only - Patient Does Not Require Further Acute Occupational Therapy at this Time.  However, May Benefit from Post Acute Therapy for Higher Level Functional Deficits.   Prior Living Situation   Housing / Facility 2 Story House   Steps Into Home 2   Steps In Home 20   Rail Both Rail (Steps in Home)   Elevator No   Bathroom Set up Walk In Shower;Grab Bars;Shower Chair   Equipment Owned Front-Wheel Walker;4-Wheel Walker;Single Point Cane;Grab Bar(s) By Toilet;Grab Bar(s) In Tub / Shower;Tub / Shower Seat   Lives with - Patient's Self Care Capacity Sibling   Comments Pt and  brother reside in a 2 story home in [place], [place].  Brother is avilable to assist as needed.  PLOF Mod I to Indep for ADL's, transfers and ambulation w/out a device.   Prior Level of ADL Function   Self Feeding Independent   Grooming / Hygiene Independent   Bathing Independent   Dressing Independent   Toileting Independent   Prior Level of IADL Function   Medication Management Independent   Laundry Independent   Kitchen Mobility Independent   Finances Independent   Home Management Independent   Shopping Independent   Prior Level Of Mobility Independent Without Device in Community;Independent With Steps in Community;Independent Without Device in Home;Independent With Steps in Home   Driving / Transportation Driving Independent   Precautions   Surgical Hip- Anterior   Weight Bearing Weight Bearing as Tolerated Right Lower Extremity   Vitals   O2 (LPM) 0   O2 Delivery Device None - Room Air   Pain   Intervention Cold Pack;Rest;Repositioned   Pain 0 - 10 Group   Location Hip   Location Orientation Right   Description Aching   Comfort Goal Perform Activity;Comfort with Movement   Non Verbal Descriptors   Non Verbal Scale  Calm;Unlabored Breathing   Cognition    Cognition / Consciousness WDL   Level of Consciousness Alert   Active ROM Upper Body   Active ROM Upper Body  WDL   Strength Upper Body   Upper Body Strength  WDL   Upper Body Muscle Tone   Upper Body Muscle Tone  WDL   Coordination Upper Body   Coordination WDL   Balance Assessment   Sitting Balance (Static) Good   Sitting Balance (Dynamic) Fair +   Standing Balance (Static) Fair +   Standing Balance (Dynamic) Fair   Weight Shift Sitting Good   Weight Shift Standing Fair   Comments OOB FWW   ADL Assessment   Upper Body Dressing Independent   Lower Body Dressing Minimal Assist   Toileting Supervision   How much help from another person does the patient currently need...   Putting on and taking off regular lower body clothing? 3   Bathing (including washing, rinsing,  and drying)? 3   Toileting, which includes using a toilet, bedpan, or urinal? 3   Putting on and taking off regular upper body clothing? 4   Taking care of personal grooming such as brushing teeth? 4   Eating meals? 4   6 Clicks Daily Activity Score 21   Functional Mobility   Sit to Stand Standby Assist   Bed, Chair, Wheelchair Transfer Standby Assist   Toilet Transfers Standby Assist   Transfer Method Stand Step   Mobility Sup FWW   Distance (Feet) 25   # of Times Distance was Traveled 2   Edema / Skin Assessment   Comments Surgical site/dressing clean, dry and intact.   Education Group   Education Provided Hip Precautions;Joint Protection;Home Safety;Transfers;Role of Occupational Therapist;Activities of Daily Living;Adaptive Equipment;Use of Call Light   Role of Occupational Therapist Patient Response Patient;Acceptance;Explanation;Verbal Demonstration;Family   Hip Precautions Patient Response Patient;Acceptance;Explanation;Demonstration;Handout;Teach Back;Verbal Demonstration;Action Demonstration;Family   Joint Protection Patient Response Patient;Acceptance;Explanation;Demonstration;Handout;Teach Back;Verbal Demonstration;Action Demonstration;Family   Home Safety Patient Response Patient;Acceptance;Explanation;Demonstration;Verbal Demonstration;Family   Transfers Patient Response Patient;Acceptance;Explanation;Demonstration;Teach Back;Verbal Demonstration;Action Demonstration;Family   ADL Patient Response Patient;Acceptance;Explanation;Demonstration;Teach Back;Verbal Demonstration;Action Demonstration;Family   Adaptive Equipment Patient Response Patient;Acceptance;Explanation;Demonstration;Teach Back;Verbal Demonstration;Action Demonstration;Family   Use of Call Light Patient Response Patient;Acceptance;Explanation;Demonstration;Verbal Demonstration;Family   Occupational Therapy Initial Treatment Plan    Duration Evaluation only   Anticipated Discharge Equipment and Recommendations   Discharge Recommendations  Anticipate that the patient will have no further occupational therapy needs after discharge from the hospital

## 2025-07-09 NOTE — ANESTHESIA POSTPROCEDURE EVALUATION
Patient: Shaunna Vieira    Procedure Summary       Date: 07/09/25 Room / Location: [place] [address] / SURGERY [facility]    Anesthesia Start: 0705 Anesthesia Stop: 0836    Procedure: RIGHT TOTAL HIP ARTHROPLASTY DIRECT ANTERIOR APPROACH (Right: Hip) Diagnosis: (ARTHRITIS OF RIGHT HIP - RIGHT)    Surgeons: Benjamin Mallory M.D. Responsible Provider: Chuck Ivy M.D.    Anesthesia Type: general ASA Status: 2            Final Anesthesia Type: general  Last vitals  BP   Blood Pressure : 93/62    Temp   36 °C (96.8 °F)    Pulse   65   Resp   (!) 10    SpO2   91 %      Anesthesia Post Evaluation    Patient location during evaluation: PACU  Patient participation: complete - patient participated  Level of consciousness: awake and alert  Pain score: 5    Airway patency: patent  Anesthetic complications: no  Cardiovascular status: hemodynamically stable  Respiratory status: acceptable  Hydration status: euvolemic    PONV: none          No notable events documented.     Nurse Pain Score: 5 (NPRS)

## 2025-07-09 NOTE — OP REPORT
Referring Provider: Self-Referred, Patient    PCP:  DUY Herrera    Name:  Shaunna Vieira    MRN: [id number]    DATE OF SURGERY: 07/09/25    SURGEON: Benjamin Mallory MD    PREOPERATIVE DIAGNOSIS:  ARTHRITIS OF RIGHT HIP - RIGHT     POSTOPERATIVE DIAGNOSIS:  ARTHRITIS OF RIGHT HIP - RIGHT    PROCEDURE(s) PERFORMED:  Procedure(s):  Right - RIGHT TOTAL HIP ARTHROPLASTY DIRECT ANTERIOR APPROACH - Wound Class: Clean - Incision Closure: Deep and Superficial Layers     Assistants:  Circulator: Gemini Benavides R.N.  Limb Juarez: Brennan Roland  Relief Scrub: Judah Tapia  Scrub Person: Lionel Varela  Radiology Technologist: Ian Holder  1st assist: Jeanine Mireles PA-C    Assistants were mandatory to provide adequate exposure for accurate implantation of prosthetic components, while protecting vital neurovascular structures    ANESTHESIOLOGIST:  Anesthesiologist: Chuck Ivy M.D.    ANESTHESIA: General    ESTIMATED BLOOD LOSS: 200 cc    ANTIBIOTICS: 2 gm of Ancef before incision    COMPLICATIONS: No complications    IMPLANTS:   [place]  52 mm Trident II acetabular cup  Bone screw x2  DM liner  DM bearing +0 28 mm head  Size 3 standard Accolade II    INDICATION FOR PROCEDURE: Shaunna Vieira is a 69 y.o.  female  who presented for the above procedure. The patient has failed conservative intervention and this has limited the patients ability to perform activities of daily living without restrictions. Risks and benefits of surgery were reviewed which included but were not limited to PE, DVT, infection, hardware failure or loosening, reoperation, dislocation, leg lengthening,  the need for transfusion and death. Alternatives to surgery had previously been reviewed with the patient as well.  The patient wished to proceed with the above procedure.    PROCEDURE IN DETAIL: The patient was met in the preoperative holding area. Informed consent was previously obtained in clinic and  we reviewed this again. Appropriate site was marked. The patient was taken to the operating room where anesthesia as above was performed without complication.  Leg lengths were assessed at this point. The patient was in the supine position and placed appropriately on the operative table to allow for the direct anterior approach.  The operative lower extremity was then prepped and draped in normal sterile fashion. A formal time-out was performed, identifying correct patient, correct site, and correct procedure.  The patient received antibiotics prior to incision.     A longitudinal skin incision via an anterior approach was made approximately 2 finger breadths lateral and distal to the ASIS in line with the TFL. The TFL fascia was incised and muscle belly blunted dissected and reflected laterally. A cobra retractor was then placed over the superior neck. The lateral femoral circumflex vessel was identified distally and cauterized. Cobra retractor was placed along the inferior femoral neck and another bent Hohmann retractor was place under the rectus tendon over the anterior acetabulum. A T-shaped capsulectomy was then performed, exposing the hip joint. The cobra retractors were placed intracapsular and an osteotomy of the femoral neck was performed using the oscillating saw. The femoral head was removed using the cork screw. Significant arthrosis consistent with our preoperative clinical and roentgenographic assessment was noted.  Attention was then directed toward exposure of the acetabulum.  Appropriate retractors were placed to allow for adequate expose.  Removal of periacetabular osteophytes was then performed to allow visualization of normal bony anatomy. The cotyloid fossa was cleared to allow for visualization of the medial wall.  We began remaining the acetabulum to allow for appropriate medialization.  Sequentially reaming continued to a size reamer which appeared to have appropriate fit and fill of the  acetabulum.  A trial acetabular component was then trialed and was an excellent fit.  The final size acetabular cup was implanted into the bleeding, hemispheric bed made within the acetabulum in what I felt was appropriate abduction and anteversion for this patient. A maximum rigid press-fit was obtained. Fluoroscopic guidance was utilized while placing the final cup.  Two screws were then placed without complication.  The trial liner was then placed.      Attention was then directed toward the preparation of the femur to accept the femoral component. Appropriate retractors were placed and excellent exposure was obtained.  Femoral releases were performed to allow appropriate mobilization of the femur.  Femoral preparation began with a box cut osteotome followed by a starter broach to find the femoral canal.   Serial broaching matching the patients native anteversion continued until good fit and fill was noted.  Excellent torsional stability was noted.  Fluoroscopic imaging was then used to assure appropriate positioning on the AP and lateral views.  Trial reduction was then performed.  Excellent stability was noted and I felt I restored leg lengths and offset. The hip was dislocated anteriorly and all trial components removed.  The wound was then copiously irrigated.  Insertion of the acetabular liner was done without complication.  The final femoral component was then press-fit down the medullary canal matching the native femoral anteversion. The trunnion was thoroughly cleaned and the femoral head was then impacted without complication.  The hip was reduced and again noted to have satisfactory motion, stability and restoration of leg length as was previously noted with the trial components.      The wound was copiously irrigated. Final hemostasis was obtained with electrocautery.  The tensor fascia was then closed with #1 vicrly sutures. The subcutaneous layer closed with interrupted 2-0 vicryl suture and the skin  with el.  A sterile dressing was placed. All counts were correct at the end of the case. The patient was awoken from anesthesia and seemed to tolerate procedure well.     DISPOSTION: The patient will be weight bearing as tolerated.  The patient will have PT/OT while in-house.   DVT prophylaxis will begin POD #1. Follow-up in my clinic has been set up for a wound check already.  Hip precautions will be reinforced to the patient.

## 2025-07-09 NOTE — OR NURSING
1300: Report received from Hosea LINCOLN.   1310: DC instructions reviewed with pt and pt's brother.   1336: PIV DC'd by RN. Pt discharged via wheelchair by CNA. All belongings with pt.

## (undated) DEVICE — HANDPIECE 10FT INTPLS SCT PLS IRRIGATION HAND CONTROL SET (6/PK)

## (undated) DEVICE — Device

## (undated) DEVICE — LENS/HOOD FOR SPACESUIT - (32/PK) PEEL AWAY FACE

## (undated) DEVICE — ELECTRODE DUAL RETURN W/ CORD - (50/PK)

## (undated) DEVICE — TUBING OUTFLOW HYSTEROSCPY (10EA/BX)

## (undated) DEVICE — SET EXTENSION WITH 2 PORTS (48EA/CA) ***PART #2C8610 IS A SUBSTITUTE*****

## (undated) DEVICE — GLOVE BIOGEL PI INDICATOR SZ 7.5 SURGICAL PF LF -(50/BX 4BX/CA)

## (undated) DEVICE — DRESSING TRANSPARENT FILM TEGADERM 4 X 4.75" (50EA/BX)"

## (undated) DEVICE — SENSOR OXIMETER ADULT SPO2 RD SET (20EA/BX)

## (undated) DEVICE — DRAPE UNDER BUTTOCKS FLUID - (20/CA)

## (undated) DEVICE — MASK, LARYNGEAL AIRWAY #4

## (undated) DEVICE — DEVICE TISSUE REMOVAL MYOSURE

## (undated) DEVICE — TUBING CLEARLINK DUO-VENT - C-FLO (48EA/CA)

## (undated) DEVICE — KIT  I.V. START (100EA/CA)

## (undated) DEVICE — SODIUM CHL. IRRIGATION 0.9% 3000ML (4EA/CA 65CA/PF)

## (undated) DEVICE — BIT DRILL SINGLE USE 3.3MM X 25MM (1EA)

## (undated) DEVICE — SUTURE 3-0 MONOCRYL PLUS PS-1 - 27 INCH (36/BX)

## (undated) DEVICE — COVER MAYO STAND X-LG - (22EA/CA)

## (undated) DEVICE — SET LEADWIRE 5 LEAD BEDSIDE DISPOSABLE ECG (1SET OF 5/EA)

## (undated) DEVICE — KIT ANESTHESIA W/CIRCUIT & 3/LT BAG W/FILTER (20EA/CA)

## (undated) DEVICE — SUCTION INSTRUMENT YANKAUER BULBOUS TIP W/O VENT (50EA/CA)

## (undated) DEVICE — GLOVE SURGICAL PROTEXIS PI 8.0 LF - (50PR/BX)

## (undated) DEVICE — SENSOR SPO2 NEO LNCS ADHESIVE (20/BX) SEE USER NOTES

## (undated) DEVICE — DRESSING NON ADHERENT 3 X 4 - STERILE (100/BX 12BX/CA)

## (undated) DEVICE — TIP INTPLS HFLO ML ORFC BTRY - (12/CS)  FOR SURGILAV

## (undated) DEVICE — SODIUM CHL IRRIGATION 0.9% 1000ML (12EA/CA)

## (undated) DEVICE — GLOVE BIOGEL SZ 6.5 SURGICAL PF LTX (50PR/BX 4BX/CA)

## (undated) DEVICE — TUBE CONNECTING SUCTION - CLEAR PLASTIC STERILE 72 IN (50EA/CA)

## (undated) DEVICE — NEEDLE SPINAL NON-SAFETY 18 GA X 3 IN (25EA/BX)

## (undated) DEVICE — BAG SPONGE COUNT 10.25 X 32 - BLUE (250/CA)

## (undated) DEVICE — DRESSING STERILE BURN ACTICOAT FLEX 7 (50EA/CA)

## (undated) DEVICE — HUMID-VENT HEAT AND MOISTURE EXCHANGE- (50/BX)

## (undated) DEVICE — POUCH FLUID COLLECTION INVISISHIELD - (10/BX)

## (undated) DEVICE — PROTECTOR ULNA NERVE - (36PR/CA)

## (undated) DEVICE — DRESSING ABDOMINAL PAD STERILE 8 X 10" (360EA/CA)"

## (undated) DEVICE — LACTATED RINGERS INJ 1000 ML - (14EA/CA 60CA/PF)

## (undated) DEVICE — GLOVE BIOGEL INDICATOR SZ 7.5 SURGICAL PF LTX - (50PR/BX 4BX/CA)

## (undated) DEVICE — BLADE SAGITTAL SYSTEM 18MM

## (undated) DEVICE — SUTURE 2-0 VICRYL PLUS CT-1 - 8 X 18 INCH(12/BX)

## (undated) DEVICE — GLOVE SZ 7 BIOGEL PI MICRO - PF LF (50PR/BX 4BX/CA)

## (undated) DEVICE — SUTURE 1 VICRYL PLUS CTX - 8 X 18 INCH (12/BX)

## (undated) DEVICE — TIP INTPLS HFLO ML ORFC BTRY - (12/CS) FOR SURGILAV

## (undated) DEVICE — DEVICE TISSUE REMOVAL XL MYOSURE

## (undated) DEVICE — GLOVE BIOGEL PI ULTRATOUCH SZ 7.5 SURGICAL PF LF -(50/BX 4BX/CA)

## (undated) DEVICE — ADVANCED NOVASURE STERILE DEVICE (3EA/PK)

## (undated) DEVICE — CLOSURE PRINEO SKIN - (2EA/BX)

## (undated) DEVICE — STOCKINET TUBULAR 6IN STERILE - 6 X 48YDS (25/CA)

## (undated) DEVICE — WRAP COBAN SELF-ADHERENT 6 IN X 5YDS STERILE TAN (12/CA)

## (undated) DEVICE — MIXER BONE CEMENT REVOLUTION - W/FEMORAL PRESSURIZER (6/CA)

## (undated) DEVICE — GLOVE BIOGEL INDICATOR SZ 8 SURGICAL PF LTX - (50/BX 4BX/CA)

## (undated) DEVICE — SUTURE 0 VICRYL PLUS CT-1 - 36 INCH (36/BX)

## (undated) DEVICE — CANISTER SUCTION RIGID RED 1500CC (40EA/CA)

## (undated) DEVICE — GLOVE BIOGEL SZ 7 SURGICAL PF LTX - (50PR/BX 4BX/CA)

## (undated) DEVICE — DEVICE SKIN CLOSURE SURGICAL ZIP 24 (5EA/PK)

## (undated) DEVICE — PAD SANITARY 11IN MAXI IND WRAPPED  (12EA/PK 24PK/CA)

## (undated) DEVICE — ELECTRODE 850 FOAM ADHESIVE - HYDROGEL RADIOTRNSPRNT (50/PK)

## (undated) DEVICE — GLOVE BIOGEL INDICATOR SZ 7SURGICAL PF LTX - (50/BX 4BX/CA)

## (undated) DEVICE — GLOVE BIOGEL PI INDICATOR SZ 8.0 SURGICAL PF LF -(50/BX 4BX/CA)

## (undated) DEVICE — WATER IRRIGATION STERILE 1000ML (12EA/CA)

## (undated) DEVICE — BLADE SAGITTAL 6 SYSTEM 25MM

## (undated) DEVICE — DRESSING, 4X4 VIGILON STERILE

## (undated) DEVICE — MASK ANESTHESIA ADULT  - (100/CA)

## (undated) DEVICE — DRESSING TEGADERM 8 X 12 - (10/BX 8BX/CA)

## (undated) DEVICE — CANISTER SUCTION 3000ML MECHANICAL FILTER AUTO SHUTOFF MEDI-VAC NONSTERILE LF DISP  (40EA/CA)

## (undated) DEVICE — GLOVE SZ 7.5 BIOGEL PI MICRO - PF LF (50PR/BX)

## (undated) DEVICE — GLOVE BIOGEL SZ 7.5 SURGICAL PF LTX - (50PR/BX 4BX/CA)

## (undated) DEVICE — SUTURE GENERAL

## (undated) DEVICE — PACK TOTAL KNEE  (1/CA)

## (undated) DEVICE — GOWN WARMING STANDARD FLEX - (30/CA)

## (undated) DEVICE — TRAY EPIDURAL SINGLE SHOT (10EA/CA)

## (undated) DEVICE — CATHETER IV 20 GA X 1-1/4 ---SURG.& SDS ONLY--- (50EA/BX)

## (undated) DEVICE — DERMABOND ADVANCED - (12EA/BX)

## (undated) DEVICE — PACK TOTAL HIP - (2EA/CA)

## (undated) DEVICE — PAD PREP 24 X 48 CUFFED - (100/CA)

## (undated) DEVICE — CHLORAPREP 26 ML APPLICATOR - ORANGE TINT(25/CA)

## (undated) DEVICE — SPECIMEN PLASTIC CONVERTOR - (10/CA)

## (undated) DEVICE — GLOVE BIOGEL SZ 8 SURGICAL PF LTX - (50PR/BX 4BX/CA)

## (undated) DEVICE — TRAY SRGPRP PVP IOD WT PRP - (20/CA)

## (undated) DEVICE — DRAPE C-ARM LARGE 41IN X 74 IN - (10/BX 2BX/CA)

## (undated) DEVICE — CONTAINER SPECIMEN BAG OR - STERILE 4 OZ W/LID (100EA/CA)

## (undated) DEVICE — TUBING INFLOW HYSTEROSCOPY (10EA/CA)

## (undated) DEVICE — SUTURE 2-0 MONOCRYL PLUS UNDYED CT-1 1 X 36 (36EA/BX)"

## (undated) DEVICE — HEAD HOLDER JUNIOR/ADULT

## (undated) DEVICE — BANDAID SHEER SPOT LF (100EA/BX 12BX/CA)

## (undated) DEVICE — CLOSURE SKIN STRIP 1/2 X 4 IN - (STERI STRIP) (50/BX 4BX/CA)

## (undated) DEVICE — BLADE RECIPROCATING 12.7 X 78.7 X 1.0MM (1/EA)

## (undated) DEVICE — SUTURE 0 VICRYL PLUS CT-1 - 8 X 18 INCH (12/BX)

## (undated) DEVICE — GLOVE BIOGEL PI INDICATOR SZ 7.0 SURGICAL PF LF - (50/BX 4BX/CA)

## (undated) DEVICE — TOWEL STOP TIMEOUT SAFETY FLAG (40EA/CA)

## (undated) DEVICE — SUTURE 5 ETHIBOND V-37 (12PK/BX)

## (undated) DEVICE — BLADE SAGITTAL SAW DUAL CUT 75.0 X 25.0MM (1/EA)